# Patient Record
Sex: FEMALE | Race: WHITE | Employment: UNEMPLOYED
[De-identification: names, ages, dates, MRNs, and addresses within clinical notes are randomized per-mention and may not be internally consistent; named-entity substitution may affect disease eponyms.]

---

## 2017-01-02 ENCOUNTER — TELEPHONE (OUTPATIENT)
Dept: FAMILY MEDICINE CLINIC | Facility: CLINIC | Age: 52
End: 2017-01-02

## 2017-11-04 ENCOUNTER — HOSPITAL ENCOUNTER (EMERGENCY)
Age: 52
Discharge: HOME | End: 2017-11-04
Payer: COMMERCIAL

## 2017-11-04 VITALS
OXYGEN SATURATION: 100 % | SYSTOLIC BLOOD PRESSURE: 161 MMHG | TEMPERATURE: 99.4 F | RESPIRATION RATE: 16 BRPM | DIASTOLIC BLOOD PRESSURE: 73 MMHG | HEART RATE: 81 BPM

## 2017-11-04 ASSESSMENT — PAIN DESCRIPTION - PAIN TYPE: TYPE: ACUTE PAIN

## 2017-11-04 ASSESSMENT — PAIN DESCRIPTION - LOCATION: LOCATION: LEG

## 2017-11-04 ASSESSMENT — PAIN DESCRIPTION - ORIENTATION: ORIENTATION: LEFT

## 2017-11-04 ASSESSMENT — PAIN SCALES - GENERAL: PAINLEVEL_OUTOF10: 3

## 2017-11-04 ASSESSMENT — PAIN DESCRIPTION - DESCRIPTORS: DESCRIPTORS: ACHING;TINGLING;NUMBNESS

## 2019-04-02 ENCOUNTER — OFFICE VISIT (OUTPATIENT)
Dept: PRIMARY CARE CLINIC | Age: 54
End: 2019-04-02
Payer: COMMERCIAL

## 2019-04-02 VITALS
WEIGHT: 171 LBS | TEMPERATURE: 98.4 F | SYSTOLIC BLOOD PRESSURE: 169 MMHG | HEIGHT: 67 IN | DIASTOLIC BLOOD PRESSURE: 100 MMHG | BODY MASS INDEX: 26.84 KG/M2 | HEART RATE: 73 BPM | RESPIRATION RATE: 12 BRPM

## 2019-04-02 DIAGNOSIS — Z12.11 SCREENING FOR COLON CANCER: Primary | ICD-10-CM

## 2019-04-02 DIAGNOSIS — I10 ESSENTIAL HYPERTENSION: Primary | ICD-10-CM

## 2019-04-02 DIAGNOSIS — Z72.0 TOBACCO ABUSE: ICD-10-CM

## 2019-04-02 DIAGNOSIS — F34.1 DYSTHYMIA: ICD-10-CM

## 2019-04-02 DIAGNOSIS — R53.83 FATIGUE, UNSPECIFIED TYPE: ICD-10-CM

## 2019-04-02 DIAGNOSIS — R93.5 ABNORMAL CT OF THE ABDOMEN: ICD-10-CM

## 2019-04-02 DIAGNOSIS — Z76.89 ENCOUNTER TO ESTABLISH CARE: ICD-10-CM

## 2019-04-02 PROCEDURE — G8427 DOCREV CUR MEDS BY ELIG CLIN: HCPCS | Performed by: NURSE PRACTITIONER

## 2019-04-02 PROCEDURE — 3017F COLORECTAL CA SCREEN DOC REV: CPT | Performed by: NURSE PRACTITIONER

## 2019-04-02 PROCEDURE — 99213 OFFICE O/P EST LOW 20 MIN: CPT | Performed by: NURSE PRACTITIONER

## 2019-04-02 PROCEDURE — 4004F PT TOBACCO SCREEN RCVD TLK: CPT | Performed by: NURSE PRACTITIONER

## 2019-04-02 PROCEDURE — G8419 CALC BMI OUT NRM PARAM NOF/U: HCPCS | Performed by: NURSE PRACTITIONER

## 2019-04-02 RX ORDER — LISINOPRIL 10 MG/1
10 TABLET ORAL DAILY
Qty: 90 TABLET | Refills: 0 | Status: SHIPPED | OUTPATIENT
Start: 2019-04-02 | End: 2019-07-03 | Stop reason: SDUPTHER

## 2019-04-02 RX ORDER — PAROXETINE HYDROCHLORIDE 20 MG/1
20 TABLET, FILM COATED ORAL EVERY MORNING
Qty: 30 TABLET | Refills: 3 | Status: SHIPPED | OUTPATIENT
Start: 2019-04-02 | End: 2019-04-08 | Stop reason: SINTOL

## 2019-04-02 ASSESSMENT — PATIENT HEALTH QUESTIONNAIRE - PHQ9
8. MOVING OR SPEAKING SO SLOWLY THAT OTHER PEOPLE COULD HAVE NOTICED. OR THE OPPOSITE, BEING SO FIGETY OR RESTLESS THAT YOU HAVE BEEN MOVING AROUND A LOT MORE THAN USUAL: 0
SUM OF ALL RESPONSES TO PHQ9 QUESTIONS 1 & 2: 4
5. POOR APPETITE OR OVEREATING: 0
3. TROUBLE FALLING OR STAYING ASLEEP: 2
6. FEELING BAD ABOUT YOURSELF - OR THAT YOU ARE A FAILURE OR HAVE LET YOURSELF OR YOUR FAMILY DOWN: 0
1. LITTLE INTEREST OR PLEASURE IN DOING THINGS: 2
SUM OF ALL RESPONSES TO PHQ QUESTIONS 1-9: 10
7. TROUBLE CONCENTRATING ON THINGS, SUCH AS READING THE NEWSPAPER OR WATCHING TELEVISION: 2
4. FEELING TIRED OR HAVING LITTLE ENERGY: 2
2. FEELING DOWN, DEPRESSED OR HOPELESS: 2
9. THOUGHTS THAT YOU WOULD BE BETTER OFF DEAD, OR OF HURTING YOURSELF: 0
10. IF YOU CHECKED OFF ANY PROBLEMS, HOW DIFFICULT HAVE THESE PROBLEMS MADE IT FOR YOU TO DO YOUR WORK, TAKE CARE OF THINGS AT HOME, OR GET ALONG WITH OTHER PEOPLE: 1
SUM OF ALL RESPONSES TO PHQ QUESTIONS 1-9: 10

## 2019-04-02 ASSESSMENT — ENCOUNTER SYMPTOMS
BLURRED VISION: 0
SINUS PAIN: 0
BLOOD IN STOOL: 0
DIARRHEA: 0
ABDOMINAL DISTENTION: 0
WHEEZING: 0
SINUS PRESSURE: 0
VOMITING: 0
ABDOMINAL PAIN: 0
BACK PAIN: 1
NAUSEA: 0
RHINORRHEA: 0
PHOTOPHOBIA: 0
SHORTNESS OF BREATH: 0
CONSTIPATION: 1

## 2019-04-02 NOTE — PATIENT INSTRUCTIONS
problems. You and your doctor will talk about your risks of these problems based on your blood pressure. Your doctor will give you a goal for your blood pressure. Your goal will be based on your health and your age. Lifestyle changes, such as eating healthy and being active, are always important to help lower blood pressure. You might also take medicine to reach your blood pressure goal.  Follow-up care is a key part of your treatment and safety. Be sure to make and go to all appointments, and call your doctor if you are having problems. It's also a good idea to know your test results and keep a list of the medicines you take. How can you care for yourself at home? Medical treatment  · If you stop taking your medicine, your blood pressure will go back up. You may take one or more types of medicine to lower your blood pressure. Be safe with medicines. Take your medicine exactly as prescribed. Call your doctor if you think you are having a problem with your medicine. · Talk to your doctor before you start taking aspirin every day. Aspirin can help certain people lower their risk of a heart attack or stroke. But taking aspirin isn't right for everyone, because it can cause serious bleeding. · See your doctor regularly. You may need to see the doctor more often at first or until your blood pressure comes down. · If you are taking blood pressure medicine, talk to your doctor before you take decongestants or anti-inflammatory medicine, such as ibuprofen. Some of these medicines can raise blood pressure. · Learn how to check your blood pressure at home. Lifestyle changes  · Stay at a healthy weight. This is especially important if you put on weight around the waist. Losing even 10 pounds can help you lower your blood pressure. · If your doctor recommends it, get more exercise. Walking is a good choice. Bit by bit, increase the amount you walk every day. Try for at least 30 minutes on most days of the week.  You also may want to swim, bike, or do other activities. · Avoid or limit alcohol. Talk to your doctor about whether you can drink any alcohol. · Try to limit how much sodium you eat to less than 2,300 milligrams (mg) a day. Your doctor may ask you to try to eat less than 1,500 mg a day. · Eat plenty of fruits (such as bananas and oranges), vegetables, legumes, whole grains, and low-fat dairy products. · Lower the amount of saturated fat in your diet. Saturated fat is found in animal products such as milk, cheese, and meat. Limiting these foods may help you lose weight and also lower your risk for heart disease. · Do not smoke. Smoking increases your risk for heart attack and stroke. If you need help quitting, talk to your doctor about stop-smoking programs and medicines. These can increase your chances of quitting for good. When should you call for help? Call 911 anytime you think you may need emergency care. This may mean having symptoms that suggest that your blood pressure is causing a serious heart or blood vessel problem. Your blood pressure may be over 180/120.   For example, call 911 if:    · You have symptoms of a heart attack. These may include:  ? Chest pain or pressure, or a strange feeling in the chest.  ? Sweating. ? Shortness of breath. ? Nausea or vomiting. ? Pain, pressure, or a strange feeling in the back, neck, jaw, or upper belly or in one or both shoulders or arms. ? Lightheadedness or sudden weakness. ? A fast or irregular heartbeat.     · You have symptoms of a stroke. These may include:  ? Sudden numbness, tingling, weakness, or loss of movement in your face, arm, or leg, especially on only one side of your body. ? Sudden vision changes. ? Sudden trouble speaking. ? Sudden confusion or trouble understanding simple statements. ? Sudden problems with walking or balance. ? A sudden, severe headache that is different from past headaches.     · You have severe back or belly pain.  Do not wait until your blood pressure comes down on its own. Get help right away.   Call your doctor now or seek immediate care if:    · Your blood pressure is much higher than normal (such as 180/120 or higher), but you don't have symptoms.     · You think high blood pressure is causing symptoms, such as:  ? Severe headache.  ? Blurry vision.    Watch closely for changes in your health, and be sure to contact your doctor if:    · Your blood pressure measures higher than your doctor recommends at least 2 times. That means the top number is higher or the bottom number is higher, or both.     · You think you may be having side effects from your blood pressure medicine. Where can you learn more? Go to [email or web address] org and sign in to your [employer] account. Enter E660 in the DE[employer] box to learn more about \"High Blood Pressure: Care Instructions. \"     If you do not have an account, please click on the \"Sign Up Now\" link. Current as of: July 22, 2018  Content Version: 11.9  © 2509-6320 [employer]. Care instructions adapted under license by [facility]"[employer]" [facility] ([facility]). If you have questions about a medical condition or this instruction, always ask your healthcare professional. [address] any warranty or liability for your use of this information.

## 2019-04-02 NOTE — PROGRESS NOTES
She will do neck exercises and they headaches typically go away). Associated symptoms include back pain and dizziness (hx of veritgo, intermittent dizziness with sudden movement. Has taken meclizine in the past with relief of symptoms however that medication makes her drowsy so she doent take it anymore). Pertinent negatives include no abdominal pain, blurred vision, fever, nausea, photophobia, rhinorrhea, seizures, sinus pressure or vomiting. Nothing aggravates the symptoms. Her past medical history is significant for hypertension and migraine headaches. There is no history of obesity or sinus disease. Hypertension   This is a new problem. The current episode started more than 1 year ago (over the past 2 years she has had elevated blood pressure when she has had health screenings for work). The problem is uncontrolled. Associated symptoms include anxiety, headaches, malaise/fatigue and palpitations. Pertinent negatives include no blurred vision, chest pain, peripheral edema or shortness of breath. There are no associated agents to hypertension. Risk factors for coronary artery disease include stress, smoking/tobacco exposure, sedentary lifestyle and post-menopausal state. Past treatments include nothing. There is no history of CAD/MI, CVA or heart failure. Past Medical History:     Past Medical History:   Diagnosis Date    GERD (gastroesophageal reflux disease)     Headache     Insomnia       Reviewed all health maintenance requirements and ordered appropriate tests  Health Maintenance Due   Topic Date Due    HIV screen  05/19/1981    Lipid screen  05/19/2006    Colon cancer screen colonoscopy  05/19/2016    Potassium monitoring  12/31/2017    Creatinine monitoring  12/31/2017       Past Surgical History:     History reviewed. No pertinent surgical history. Medications:       Prior to Admission medications    Medication Sig Start Date End Date Taking?  Authorizing Provider   lisinopril (PRINIVIL;ZESTRIL) 10 MG tablet Take 1 tablet by mouth daily 4/2/19  Yes CASTILLO Douglas CNP   PARoxetine (PAXIL) 20 MG tablet Take 1 tablet by mouth every morning 4/2/19  Yes CASTILLO Douglas CNP        Allergies:       Patient has no known allergies. Social History:     Tobacco:    reports that she has been smoking. She has never used smokeless tobacco.  Alcohol:      reports that she does not drink alcohol. Drug Use:  reports that she does not use drugs. Family History:     History reviewed. No pertinent family history. Review of Systems:     Positive and Negative as described in HPI    Review of Systems   Constitutional: Positive for fatigue and malaise/fatigue. Negative for fever and unexpected weight change. HENT: Negative for congestion, postnasal drip, rhinorrhea, sinus pressure and sinus pain. Eyes: Negative for blurred vision and photophobia. Respiratory: Negative for shortness of breath and wheezing. Cardiovascular: Positive for palpitations. Negative for chest pain. Gastrointestinal: Positive for constipation. Negative for abdominal distention, abdominal pain, blood in stool, diarrhea, nausea and vomiting. Gas   Genitourinary: Negative for difficulty urinating, dysuria and hematuria. Musculoskeletal: Positive for back pain. Negative for gait problem. Skin: Negative for rash and wound. Neurological: Positive for dizziness (hx of veritgo, intermittent dizziness with sudden movement. Has taken meclizine in the past with relief of symptoms however that medication makes her drowsy so she doent take it anymore) and headaches. Negative for seizures and light-headedness. Psychiatric/Behavioral: Positive for dysphoric mood and sleep disturbance. Negative for self-injury and suicidal ideas. The patient is nervous/anxious.         Physical Exam:   Vitals:  BP (!) 169/100 (Site: Left Upper Arm, Position: Sitting, Cuff Size: Large Adult)   Pulse 73   Temp 98.4 °F TSH With Reflex Ft4    CBC    ALT    AST    Comprehensive Metabolic Panel   2. Dysthymia  PARoxetine (PAXIL) 20 MG tablet   3. Tobacco abuse  Lipid Panel   4. Fatigue, unspecified type  TSH With Reflex Ft4    CBC   5. Encounter to establish care     6. Abnormal CT of the abdomen  CT ABDOMEN PELVIS W IV CONTRAST Additional Contrast? Radiologist Recommendation       1. Ney Oliveira received counseling on the following healthy behaviors: nutrition, exercise, medication adherence and tobacco cessation  2. Patient given educational materials - see patient instructions  3. Was a self-tracking handout given in paper form or via [employer]? No  If yes, see orders or list here. 4.  Discussed use, benefit, and side effects of prescribed medications. Barriers to medication compliance addressed. All patient questions answered. Pt voiced understanding. 5.  Reviewed prior labs and health maintenance  6. Continue current medications, diet and exercise. Completed Refills   Requested Prescriptions     Signed Prescriptions Disp Refills    lisinopril (PRINIVIL;ZESTRIL) 10 MG tablet 90 tablet 0     Sig: Take 1 tablet by mouth daily    PARoxetine (PAXIL) 20 MG tablet 30 tablet 3     Sig: Take 1 tablet by mouth every morning         Return in about 1 month (around 4/30/2019) for 2 weeks for blood pressure recheck.

## 2019-04-03 ENCOUNTER — HOSPITAL ENCOUNTER (OUTPATIENT)
Age: 54
Discharge: HOME | End: 2019-04-03
Payer: COMMERCIAL

## 2019-04-03 ENCOUNTER — TELEPHONE (OUTPATIENT)
Dept: PRIMARY CARE CLINIC | Age: 54
End: 2019-04-03

## 2019-04-03 DIAGNOSIS — I10 ESSENTIAL HYPERTENSION: ICD-10-CM

## 2019-04-03 DIAGNOSIS — R16.0 HEPATOMEGALY: ICD-10-CM

## 2019-04-03 DIAGNOSIS — R53.83 FATIGUE, UNSPECIFIED TYPE: ICD-10-CM

## 2019-04-03 DIAGNOSIS — Z12.11 SCREENING FOR COLON CANCER: ICD-10-CM

## 2019-04-03 DIAGNOSIS — D69.6 THROMBOCYTOPENIA (HCC): Primary | ICD-10-CM

## 2019-04-03 DIAGNOSIS — Z72.0 TOBACCO ABUSE: ICD-10-CM

## 2019-04-03 LAB
ALBUMIN SERPL-MCNC: 4.5 G/DL (ref 3.5–5.2)
ALBUMIN/GLOBULIN RATIO: 1.6 (ref 1–2.5)
ALP BLD-CCNC: 61 U/L (ref 35–104)
ALT SERPL-CCNC: 16 U/L (ref 5–33)
ANION GAP SERPL CALCULATED.3IONS-SCNC: 12 MMOL/L (ref 9–17)
AST SERPL-CCNC: 16 U/L
BILIRUB SERPL-MCNC: 1.04 MG/DL (ref 0.3–1.2)
BUN BLDV-MCNC: 13 MG/DL (ref 6–20)
BUN/CREAT BLD: 13 (ref 9–20)
CALCIUM SERPL-MCNC: 9.9 MG/DL (ref 8.6–10.4)
CHLORIDE BLD-SCNC: 103 MMOL/L (ref 98–107)
CHOLESTEROL/HDL RATIO: 2.7
CHOLESTEROL: 154 MG/DL
CO2: 25 MMOL/L (ref 20–31)
CONTROL: PRESENT
CREAT SERPL-MCNC: 0.98 MG/DL (ref 0.5–0.9)
GFR AFRICAN AMERICAN: >60 ML/MIN
GFR NON-AFRICAN AMERICAN: 60 ML/MIN
GFR SERPL CREATININE-BSD FRML MDRD: ABNORMAL ML/MIN/{1.73_M2}
GFR SERPL CREATININE-BSD FRML MDRD: ABNORMAL ML/MIN/{1.73_M2}
GLUCOSE BLD-MCNC: 89 MG/DL (ref 70–99)
HCT VFR BLD CALC: 42.7 % (ref 36.3–47.1)
HDLC SERPL-MCNC: 58 MG/DL
HEMOCCULT STL QL: NEGATIVE
HEMOGLOBIN: 14 G/DL (ref 11.9–15.1)
LDL CHOLESTEROL: 84 MG/DL (ref 0–130)
MCH RBC QN AUTO: 29.6 PG (ref 25.2–33.5)
MCHC RBC AUTO-ENTMCNC: 32.8 G/DL (ref 28.4–34.8)
MCV RBC AUTO: 90.3 FL (ref 82.6–102.9)
NRBC AUTOMATED: 0 PER 100 WBC
PDW BLD-RTO: 11.9 % (ref 11.8–14.4)
PLATELET # BLD: 105 K/UL (ref 138–453)
PMV BLD AUTO: 12.6 FL (ref 8.1–13.5)
POTASSIUM SERPL-SCNC: 4.1 MMOL/L (ref 3.7–5.3)
RBC # BLD: 4.73 M/UL (ref 3.95–5.11)
SODIUM BLD-SCNC: 140 MMOL/L (ref 135–144)
THYROXINE, FREE: 1.13 NG/DL (ref 0.93–1.7)
TOTAL PROTEIN: 7.3 G/DL (ref 6.4–8.3)
TRIGL SERPL-MCNC: 60 MG/DL
TSH SERPL DL<=0.05 MIU/L-ACNC: 5.1 MIU/L (ref 0.3–5)
VLDLC SERPL CALC-MCNC: NORMAL MG/DL (ref 1–30)
WBC # BLD: 5 K/UL (ref 3.5–11.3)

## 2019-04-03 PROCEDURE — 82274 ASSAY TEST FOR BLOOD FECAL: CPT | Performed by: NURSE PRACTITIONER

## 2019-04-03 PROCEDURE — 80061 LIPID PANEL: CPT

## 2019-04-03 PROCEDURE — 84439 ASSAY OF FREE THYROXINE: CPT

## 2019-04-03 PROCEDURE — 36415 COLL VENOUS BLD VENIPUNCTURE: CPT

## 2019-04-03 PROCEDURE — 85027 COMPLETE CBC AUTOMATED: CPT

## 2019-04-03 PROCEDURE — 84443 ASSAY THYROID STIM HORMONE: CPT

## 2019-04-03 PROCEDURE — 80053 COMPREHEN METABOLIC PANEL: CPT

## 2019-04-03 NOTE — TELEPHONE ENCOUNTER
Called patient and informed her that her OC FIT test results were normal and that Charli received her lab results back and her platelet count was decreased and that platelets help the blood clot and they can be decreased if you have a liver condition. Informed that Rashi Mcknight is referring her to Dr Tyler Robledo, hematologist and Dr Luís Buck, GI specialist due to decreased platelets and those offices will be calling her to schedule appt. Informed that he is also ordering a CT scan of abdomen. Verbalizes understanding.

## 2019-04-03 NOTE — TELEPHONE ENCOUNTER
----- Message from CASTILLO Jarvis CNP sent at 4/3/2019 12:50 PM EDT -----  Please notify patient that I have ordered another CT of her abdomen due to her abnormal CT scan in 2016. Also let her know that her platelet count is decreased and I am sending her to see hematology. Platelets help your blood clot. They can be decreased if you have a liver condition so I would also like her to see GI doctor also.  Thanks

## 2019-04-04 ENCOUNTER — OFFICE VISIT (OUTPATIENT)
Dept: ONCOLOGY | Age: 54
End: 2019-04-04
Payer: COMMERCIAL

## 2019-04-04 VITALS
SYSTOLIC BLOOD PRESSURE: 134 MMHG | BODY MASS INDEX: 26.59 KG/M2 | HEIGHT: 67 IN | HEART RATE: 78 BPM | TEMPERATURE: 99.7 F | RESPIRATION RATE: 20 BRPM | DIASTOLIC BLOOD PRESSURE: 99 MMHG | WEIGHT: 169.4 LBS

## 2019-04-04 DIAGNOSIS — D69.6 THROMBOCYTOPENIA (HCC): ICD-10-CM

## 2019-04-04 PROCEDURE — 99204 OFFICE O/P NEW MOD 45 MIN: CPT | Performed by: INTERNAL MEDICINE

## 2019-04-04 PROCEDURE — G8419 CALC BMI OUT NRM PARAM NOF/U: HCPCS | Performed by: INTERNAL MEDICINE

## 2019-04-04 PROCEDURE — G8427 DOCREV CUR MEDS BY ELIG CLIN: HCPCS | Performed by: INTERNAL MEDICINE

## 2019-04-04 PROCEDURE — 4004F PT TOBACCO SCREEN RCVD TLK: CPT | Performed by: INTERNAL MEDICINE

## 2019-04-04 PROCEDURE — 3017F COLORECTAL CA SCREEN DOC REV: CPT | Performed by: INTERNAL MEDICINE

## 2019-04-04 ASSESSMENT — ENCOUNTER SYMPTOMS
CHEST TIGHTNESS: 0
EYE REDNESS: 0
ABDOMINAL PAIN: 0
EYE ITCHING: 0
DIARRHEA: 0
VOMITING: 0
CONSTIPATION: 0
NAUSEA: 0
COUGH: 0
SHORTNESS OF BREATH: 0
WHEEZING: 0
BACK PAIN: 1
COLOR CHANGE: 0
BLOOD IN STOOL: 0

## 2019-04-04 NOTE — LETTER
2019     CASTILLO Hoffman CNP   [address]    Dear CASTILLO Anna CNP, and Dr. Calderon Roberson: Thank you for referring Don Balderas, 1965, to me for evaluation. Below are the relevant portions of my assessment and plan of care. [facility]  [facility]  [facility]  [address] [id number]  Dept: [phone]  Dept Fax: [phone]  Jennifer Villalta is a 47 y.o. female who presentstoday for follow up of her   Chief Complaint   Patient presents with    New Patient     Referred by Berny Avina Thrombocytopenia         HPI Don Balderas is a 73-year-old lady who has had thrombocytopenia for quite some time. Her recent platelet count is 684,357. In 2016 she had a CAT scan of the abdomen and pelvis when she went to the emergency room and was sick after her son . The scan showed multiple nodules and masses scattered throughout the retroperitoneum and mesentery area the largest one was 2.4 x 3.7 cm in size. It also showed hepatomegaly. She never followed up for that and was referred to me for evaluation of thrombocytopenia. She denies any fevers but she states she's had night sweats for years. She has not lost any weight. She has family history of leukemia. She also has depression and was recently started on Paxil. She smokes one half pack of cigarettes a day and is cutting down on it now. She does not drink any alcohol but used to drink heavy when she was a teenager. Current Outpatient Medications   Medication Sig Dispense Refill    lisinopril (PRINIVIL;ZESTRIL) 10 MG tablet Take 1 tablet by mouth daily 90 tablet 0    PARoxetine (PAXIL) 20 MG tablet Take 1 tablet by mouth every morning 30 tablet 3     No current facility-administered medications for this visit.         No Known Allergies    Past Medical History:   Diagnosis Date    GERD (gastroesophageal reflux disease)     Headache     Insomnia History reviewed. No pertinent surgical history. History reviewed. No pertinent family history. Social History     Tobacco Use    Smoking status: Current Every Day Smoker    Smokeless tobacco: Never Used   Substance Use Topics    Alcohol use: No          The Past MedicalHistory, Past Surgical History, Past Family History and Past Social History havebeen reviewed      Review of Systems   Constitutional: Negative for activity change, appetite change, chills, diaphoresis, fatigue, fever and unexpected weight change. HENT: Negative for congestion, hearing loss, mouth sores and nosebleeds. Eyes: Negative for redness, itching and visual disturbance. Respiratory: Negative for cough, chest tightness, shortness of breath and wheezing. Cardiovascular: Negative for chest pain, palpitations and leg swelling. Gastrointestinal: Negative for abdominal pain, blood in stool, constipation, diarrhea, nausea and vomiting. Genitourinary: Negative for decreased urine volume, difficulty urinating, dysuria, flank pain, frequency, hematuria, pelvic pain and urgency. Musculoskeletal: Positive for back pain. Negative for arthralgias, joint swelling and myalgias. Skin: Negative for color change, pallor and rash. Neurological: Negative for dizziness, seizures, syncope, weakness, light-headedness, numbness and headaches. Hematological: Negative for adenopathy. Does not bruise/bleed easily. Psychiatric/Behavioral: Negative for agitation, behavioral problems and confusion. Depressed     Physical Exam   Constitutional: She is oriented to person, place, and time. She appears well-developed and well-nourished. No distress. HENT:   Head: Normocephalic. Eyes: Pupils are equal, round, and reactive to light. No scleral icterus. Neck: Neck supple. No thyromegaly present. Cardiovascular: Normal rate and regular rhythm. No murmur heard.   Pulmonary/Chest: Effort normal and breath sounds normal. No respiratory distress. She has no wheezes. Right breast exhibits no mass. Left breast exhibits no mass. Abdominal: Soft. She exhibits no mass. There is no hepatosplenomegaly. There is no tenderness. Musculoskeletal: Normal range of motion. She exhibits no edema or tenderness. Lymphadenopathy:     She has no cervical adenopathy. She has no axillary adenopathy. Neurological: She is alert and oriented to person, place, and time. No cranial nerve deficit. Skin: Skin is warm and dry. No cyanosis. Nails show no clubbing. Psychiatric: She has a normal mood and affect. Her behavior is normal. Thought content normal.   Nursing note and vitals reviewed.     Results for orders placed or performed during the hospital encounter of 04/03/19   Lipid Panel   Result Value Ref Range    Cholesterol 154 <200 mg/dL    HDL 58 >40 mg/dL    LDL Cholesterol 84 0 - 130 mg/dL    Chol/HDL Ratio 2.7 <5    Triglycerides 60 <150 mg/dL    VLDL NOT REPORTED 1 - 30 mg/dL   TSH With Reflex Ft4   Result Value Ref Range    TSH 5.10 (H) 0.30 - 5.00 mIU/L   CBC   Result Value Ref Range    WBC 5.0 3.5 - 11.3 k/uL    RBC 4.73 3.95 - 5.11 m/uL    Hemoglobin 14.0 11.9 - 15.1 g/dL    Hematocrit 42.7 36.3 - 47.1 %    MCV 90.3 82.6 - 102.9 fL    MCH 29.6 25.2 - 33.5 pg    MCHC 32.8 28.4 - 34.8 g/dL    RDW 11.9 11.8 - 14.4 %    Platelets 010 (L) 189 - 453 k/uL    MPV 12.6 8.1 - 13.5 fL    NRBC Automated 0.0 0.0 per 100 WBC   Comprehensive Metabolic Panel   Result Value Ref Range    Glucose 89 70 - 99 mg/dL    BUN 13 6 - 20 mg/dL    CREATININE 0.98 (H) 0.50 - 0.90 mg/dL    Bun/Cre Ratio 13 9 - 20    Calcium 9.9 8.6 - 10.4 mg/dL    Sodium 140 135 - 144 mmol/L    Potassium 4.1 3.7 - 5.3 mmol/L    Chloride 103 98 - 107 mmol/L    CO2 25 20 - 31 mmol/L    Anion Gap 12 9 - 17 mmol/L    Alkaline Phosphatase 61 35 - 104 U/L    ALT 16 5 - 33 U/L    AST 16 <32 U/L    Total Bilirubin 1.04 0.3 - 1.2 mg/dL    Total Protein 7.3 6.4 - 8.3 g/dL    Alb 4.5 3.5 - 5.2 g/dL Albumin/Globulin Ratio 1.6 1.0 - 2.5    GFR Non-African American 60 (L) >60 mL/min    GFR African American >60 >60 mL/min    GFR Comment          GFR Staging         T4, Free   Result Value Ref Range    Thyroxine, Free 1.13 0.93 - 1.70 ng/dL       ASSESSMENT:      Diagnosis Orders   1. Thrombocytopenia (HCC)  BRIA    Anti-DNA Antibody, Double-Stranded    Platelet antibodies, direct    Electrophoresis Protein, Serum without Reflex to Immunofixation    Immunofixation electrophoresis    Immunoglobulin Panel (IgG, IgA, IgM)    [place]/Lambda Free Lt Chains, Serum Quant    Lactate Dehydrogenase    Antiphospholipid Antibody Panel    Lupus Anticoagulant    Ferritin    Iron and TIBC    Vitamin B12 & Folate    Soluble transferrin receptor     Thrombocytopenia rule out secondary to autoimmune etiology or an underlying bone marrow disorder or involvement of a malignant process of the bone marrow. Extensive retroperitoneal lymphadenopathy. He was scheduled for a CAT scan of the abdomen and pelvis and also will do a complete thrombocytopenia workup on her and see her back next week. PLAN:      Return in about 1 week (around 4/11/2019).     Orders Placed This Encounter   Procedures    BRIA     Standing Status:   Future     Standing Expiration Date:   4/4/2020    Anti-DNA Antibody, Double-Stranded     Standing Status:   Future     Standing Expiration Date:   4/4/2020    Platelet antibodies, direct     Standing Status:   Future     Standing Expiration Date:   4/4/2020    Electrophoresis Protein, Serum without Reflex to Immunofixation     Standing Status:   Future     Standing Expiration Date:   4/4/2020    Immunofixation electrophoresis     Standing Status:   Future     Standing Expiration Date:   4/4/2020    Immunoglobulin Panel (IgG, IgA, IgM)     Standing Status:   Future     Standing Expiration Date:   4/4/2020    [place]/Lambda Free Lt Chains, Serum Quant     Standing Status:   Future Standing Expiration Date:   4/4/2020    Lactate Dehydrogenase     Standing Status:   Future     Standing Expiration Date:   4/4/2020    Antiphospholipid Antibody Panel     Standing Status:   Future     Standing Expiration Date:   4/4/2020    Lupus Anticoagulant     Standing Status:   Future     Standing Expiration Date:   4/4/2020    Ferritin     Standing Status:   Future     Standing Expiration Date:   4/4/2020    Iron and TIBC     Standing Status:   Future     Standing Expiration Date:   4/4/2020     Order Specific Question:   Is Patient Fasting? Answer:   No     Order Specific Question:   No of Hours? Answer:   No    Vitamin B12 & Folate     Standing Status:   Future     Standing Expiration Date:   4/4/2020    Soluble transferrin receptor     Standing Status:   Future     Standing Expiration Date:   4/4/2020     No orders of the defined types were placed in this encounter. Electronicallysigned by Kristen Rodriguez MD on 4/4/2019 at 11:31 AM      If you have questions, please do not hesitate to call me. I look forward to following Cindy Salguero along with you.     Sincerely,        Kristen Rodriguez MD  Phone: [phone]

## 2019-04-04 NOTE — PROGRESS NOTES
[facility] PHYSICIANS  [facility] ONCOLOG[facility] SPECIALISTS  [address]  [place] [id number]  Dept: [phone]  Dept Fax: [phone]  Romero Lanier is a 47 y.o. female who presentstoday for follow up of her   Chief Complaint   Patient presents with    New Patient     Referred by Ivon Dsouza Thrombocytopenia         HPI Blaire Alexander is a 50-year-old lady who has had thrombocytopenia for quite some time. Her recent platelet count is [phone]. In 2016 she had a CAT scan of the abdomen and pelvis when she went to the emergency room and was sick after her son . The scan showed multiple nodules and masses scattered throughout the retroperitoneum and mesentery area the largest one was 2.4 x 3.7 cm in size. It also showed hepatomegaly. She never followed up for that and was referred to me for evaluation of thrombocytopenia. She denies any fevers but she states she's had night sweats for years. She has not lost any weight. She has family history of leukemia. She also has depression and was recently started on Paxil. She smokes one half pack of cigarettes a day and is cutting down on it now. She does not drink any alcohol but used to drink heavy when she was a teenager. Current Outpatient Medications   Medication Sig Dispense Refill    lisinopril (PRINIVIL;ZESTRIL) 10 MG tablet Take 1 tablet by mouth daily 90 tablet 0    PARoxetine (PAXIL) 20 MG tablet Take 1 tablet by mouth every morning 30 tablet 3     No current facility-administered medications for this visit. No Known Allergies    Past Medical History:   Diagnosis Date    GERD (gastroesophageal reflux disease)     Headache     Insomnia         History reviewed. No pertinent surgical history. History reviewed. No pertinent family history.     Social History     Tobacco Use    Smoking status: Current Every Day Smoker    Smokeless tobacco: Never Used   Substance Use Topics    Alcohol use: No          The Past MedicalHistory, Past Surgical History, Past Family History and Past Social History havebeen reviewed      Review of Systems   Constitutional: Negative for activity change, appetite change, chills, diaphoresis, fatigue, fever and unexpected weight change. HENT: Negative for congestion, hearing loss, mouth sores and nosebleeds. Eyes: Negative for redness, itching and visual disturbance. Respiratory: Negative for cough, chest tightness, shortness of breath and wheezing. Cardiovascular: Negative for chest pain, palpitations and leg swelling. Gastrointestinal: Negative for abdominal pain, blood in stool, constipation, diarrhea, nausea and vomiting. Genitourinary: Negative for decreased urine volume, difficulty urinating, dysuria, flank pain, frequency, hematuria, pelvic pain and urgency. Musculoskeletal: Positive for back pain. Negative for arthralgias, joint swelling and myalgias. Skin: Negative for color change, pallor and rash. Neurological: Negative for dizziness, seizures, syncope, weakness, light-headedness, numbness and headaches. Hematological: Negative for adenopathy. Does not bruise/bleed easily. Psychiatric/Behavioral: Negative for agitation, behavioral problems and confusion. Depressed     Physical Exam   Constitutional: She is oriented to person, place, and time. She appears well-developed and well-nourished. No distress. HENT:   Head: Normocephalic. Eyes: Pupils are equal, round, and reactive to light. No scleral icterus. Neck: Neck supple. No thyromegaly present. Cardiovascular: Normal rate and regular rhythm. No murmur heard. Pulmonary/Chest: Effort normal and breath sounds normal. No respiratory distress. She has no wheezes. Right breast exhibits no mass. Left breast exhibits no mass. Abdominal: Soft. She exhibits no mass. There is no hepatosplenomegaly. There is no tenderness. Musculoskeletal: Normal range of motion. She exhibits no edema or tenderness.    Lymphadenopathy:     She has no cervical adenopathy. She has no axillary adenopathy. Neurological: She is alert and oriented to person, place, and time. No cranial nerve deficit. Skin: Skin is warm and dry. No cyanosis. Nails show no clubbing. Psychiatric: She has a normal mood and affect. Her behavior is normal. Thought content normal.   Nursing note and vitals reviewed. Results for orders placed or performed during the hospital encounter of 04/03/19   Lipid Panel   Result Value Ref Range    Cholesterol 154 <200 mg/dL    HDL 58 >40 mg/dL    LDL Cholesterol 84 0 - 130 mg/dL    Chol/HDL Ratio 2.7 <5    Triglycerides 60 <150 mg/dL    VLDL NOT REPORTED 1 - 30 mg/dL   TSH With Reflex Ft4   Result Value Ref Range    TSH 5.10 (H) 0.30 - 5.00 mIU/L   CBC   Result Value Ref Range    WBC 5.0 3.5 - 11.3 k/uL    RBC 4.73 3.95 - 5.11 m/uL    Hemoglobin 14.0 11.9 - 15.1 g/dL    Hematocrit 42.7 36.3 - 47.1 %    MCV 90.3 82.6 - 102.9 fL    MCH 29.6 25.2 - 33.5 pg    MCHC 32.8 28.4 - 34.8 g/dL    RDW 11.9 11.8 - 14.4 %    Platelets 605 (L) 389 - 453 k/uL    MPV 12.6 8.1 - 13.5 fL    NRBC Automated 0.0 0.0 per 100 WBC   Comprehensive Metabolic Panel   Result Value Ref Range    Glucose 89 70 - 99 mg/dL    BUN 13 6 - 20 mg/dL    CREATININE 0.98 (H) 0.50 - 0.90 mg/dL    Bun/Cre Ratio 13 9 - 20    Calcium 9.9 8.6 - 10.4 mg/dL    Sodium 140 135 - 144 mmol/L    Potassium 4.1 3.7 - 5.3 mmol/L    Chloride 103 98 - 107 mmol/L    CO2 25 20 - 31 mmol/L    Anion Gap 12 9 - 17 mmol/L    Alkaline Phosphatase 61 35 - 104 U/L    ALT 16 5 - 33 U/L    AST 16 <32 U/L    Total Bilirubin 1.04 0.3 - 1.2 mg/dL    Total Protein 7.3 6.4 - 8.3 g/dL    Alb 4.5 3.5 - 5.2 g/dL    Albumin/Globulin Ratio 1.6 1.0 - 2.5    GFR Non-African American 60 (L) >60 mL/min    GFR African American >60 >60 mL/min    GFR Comment          GFR Staging         T4, Free   Result Value Ref Range    Thyroxine, Free 1.13 0.93 - 1.70 ng/dL       ASSESSMENT:      Diagnosis Orders   1.  Thrombocytopenia ([facility]  BRIA    Anti-DNA Antibody, Double-Stranded    Platelet antibodies, direct    Electrophoresis Protein, Serum without Reflex to Immunofixation    Immunofixation electrophoresis    Immunoglobulin Panel (IgG, IgA, IgM)    [place]/Lambda Free Lt Chains, Serum Quant    Lactate Dehydrogenase    Antiphospholipid Antibody Panel    Lupus Anticoagulant    Ferritin    Iron and TIBC    Vitamin B12 & Folate    Soluble transferrin receptor     Thrombocytopenia rule out secondary to autoimmune etiology or an underlying bone marrow disorder or involvement of a malignant process of the bone marrow. Extensive retroperitoneal lymphadenopathy. He was scheduled for a CAT scan of the abdomen and pelvis and also will do a complete thrombocytopenia workup on her and see her back next week. PLAN:      Return in about 1 week (around 4/11/2019).     Orders Placed This Encounter   Procedures    BRIA     Standing Status:   Future     Standing Expiration Date:   4/4/2020    Anti-DNA Antibody, Double-Stranded     Standing Status:   Future     Standing Expiration Date:   4/4/2020    Platelet antibodies, direct     Standing Status:   Future     Standing Expiration Date:   4/4/2020    Electrophoresis Protein, Serum without Reflex to Immunofixation     Standing Status:   Future     Standing Expiration Date:   4/4/2020    Immunofixation electrophoresis     Standing Status:   Future     Standing Expiration Date:   4/4/2020    Immunoglobulin Panel (IgG, IgA, IgM)     Standing Status:   Future     Standing Expiration Date:   4/4/2020    [place]/Lambda Free Lt Chains, Serum Quant     Standing Status:   Future     Standing Expiration Date:   4/4/2020    Lactate Dehydrogenase     Standing Status:   Future     Standing Expiration Date:   4/4/2020    Antiphospholipid Antibody Panel     Standing Status:   Future     Standing Expiration Date:   4/4/2020    Lupus Anticoagulant     Standing Status:   Future     Standing Expiration Date:   4/4/2020  Ferritin     Standing Status:   Future     Standing Expiration Date:   4/4/2020    Iron and TIBC     Standing Status:   Future     Standing Expiration Date:   4/4/2020     Order Specific Question:   Is Patient Fasting? Answer:   No     Order Specific Question:   No of Hours? Answer:   No    Vitamin B12 & Folate     Standing Status:   Future     Standing Expiration Date:   4/4/2020    Soluble transferrin receptor     Standing Status:   Future     Standing Expiration Date:   4/4/2020     No orders of the defined types were placed in this encounter.           Electronicallysigned by Sofi Arias MD on 4/4/2019 at 11:31 AM

## 2019-04-08 ENCOUNTER — TELEPHONE (OUTPATIENT)
Dept: PRIMARY CARE CLINIC | Age: 54
End: 2019-04-08

## 2019-04-08 DIAGNOSIS — F34.1 DYSTHYMIA: Primary | ICD-10-CM

## 2019-04-08 RX ORDER — SERTRALINE HYDROCHLORIDE 25 MG/1
25 TABLET, FILM COATED ORAL DAILY
Qty: 30 TABLET | Refills: 0 | Status: SHIPPED | OUTPATIENT
Start: 2019-04-08 | End: 2019-04-30 | Stop reason: SDUPTHER

## 2019-04-08 NOTE — TELEPHONE ENCOUNTER
Phone call from patient stating \"I cannot take the Paxil, it makes me severely nauseated and I feel like a zombie. \" Asking if there is something else she should try.       Health Maintenance   Topic Date Due    HIV screen  05/19/1981    DTaP/Tdap/Td vaccine (1 - Tdap) 04/02/2020 (Originally 5/19/1985)    Breast cancer screen  04/02/2020 (Originally 5/19/2016)    Cervical cancer screen  04/02/2020 (Originally 5/19/1987)    Shingles Vaccine (1 of 2) 04/02/2020 (Originally 5/19/2016)    Pneumococcal 0-64 years at Risk Vaccine (1 of 1 - PPSV23) 04/02/2020 (Originally 5/19/1972)    Flu vaccine (Season Ended) 09/01/2019    Colon Cancer Screen FIT/FOBT  04/03/2020    Potassium monitoring  04/03/2020    Creatinine monitoring  04/03/2020    Lipid screen  04/03/2024             (applicable per patient's age: Cancer Screenings, Depression Screening, Fall Risk Screening, Immunizations)    LDL Cholesterol (mg/dL)   Date Value   04/03/2019 84     AST (U/L)   Date Value   04/03/2019 16     ALT (U/L)   Date Value   04/03/2019 16     BUN (mg/dL)   Date Value   04/03/2019 13      (goal A1C is < 7)   (goal LDL is <100) need 30-50% reduction from baseline     BP Readings from Last 3 Encounters:   04/04/19 (!) 134/99   04/02/19 (!) 169/100   11/04/17 (!) 161/73    (goal /80)      All Future Testing planned in [facility]:  Lab Frequency Next Occurrence   CT ABDOMEN PELVIS W IV CONTRAST Additional Contrast? Radiologist Recommendation Once 04/03/2019   BRIA Once 04/04/2019   Anti-DNA Antibody, Double-Stranded Once 04/04/2019   Platelet antibodies, direct Once 04/04/2019   Electrophoresis Protein, Serum without Reflex to Immunofixation Once 04/04/2019   Immunofixation electrophoresis Once 04/04/2019   Immunoglobulin Panel (IgG, IgA, IgM) Once 04/04/2019   [place]/Lambda Free Lt Chains, Serum Quant Once 04/04/2019   Lactate Dehydrogenase Once 04/04/2019   Antiphospholipid Antibody Panel Once 04/04/2019   Lupus Anticoagulant Once 04/04/2019   Ferritin Once 04/04/2019   Iron and TIBC Once 04/04/2019   Vitamin B12 & Folate Once 04/04/2019   Soluble transferrin receptor Once 04/04/2019       Next Visit Date:  Future Appointments   Date Time Provider Jeramy Jaramilloi   4/12/2019  5:40 PM [facility] LAB DRAWING ROOM [facility] LAB None   4/12/2019  6:00 PM [facility] CAT SCAN ROOM [facility] CT [facility] Rad   4/16/2019  1:00 PM Charli Mathews, APRN - CNP [facility]   4/25/2019  2:00 PM Vidal Farmer MD [facility]   4/30/2019  1:00 PM Charli Mathews, APRN - CNP [facility]            Patient Active Problem List:     Thrombocytopenia ([facility] [address]

## 2019-04-12 ENCOUNTER — HOSPITAL ENCOUNTER (OUTPATIENT)
Dept: LAB | Age: 54
Discharge: HOME | End: 2019-04-12
Payer: COMMERCIAL

## 2019-04-12 ENCOUNTER — HOSPITAL ENCOUNTER (OUTPATIENT)
Dept: CT IMAGING | Age: 54
Discharge: HOME | End: 2019-04-14
Payer: COMMERCIAL

## 2019-04-12 DIAGNOSIS — R93.5 ABNORMAL CT OF THE ABDOMEN: ICD-10-CM

## 2019-04-12 DIAGNOSIS — D69.6 THROMBOCYTOPENIA (HCC): ICD-10-CM

## 2019-04-12 LAB
FERRITIN: 202 UG/L (ref 13–150)
FOLATE: >20 NG/ML
FREE KAPPA/LAMBDA RATIO: 1.4 (ref 0.26–1.65)
IRON SATURATION: 28 % (ref 20–55)
IRON: 113 UG/DL (ref 37–145)
KAPPA FREE LIGHT CHAINS QNT: 1.61 MG/DL (ref 0.37–1.94)
LACTATE DEHYDROGENASE: 490 U/L (ref 135–214)
LAMBDA FREE LIGHT CHAINS QNT: 1.15 MG/DL (ref 0.57–2.63)
TOTAL IRON BINDING CAPACITY: 405 UG/DL (ref 250–450)
UNSATURATED IRON BINDING CAPACITY: 292.1 UG/DL (ref 112–347)
VITAMIN B-12: 624 PG/ML (ref 232–1245)

## 2019-04-12 PROCEDURE — 85730 THROMBOPLASTIN TIME PARTIAL: CPT

## 2019-04-12 PROCEDURE — 82607 VITAMIN B-12: CPT

## 2019-04-12 PROCEDURE — 74177 CT ABD & PELVIS W/CONTRAST: CPT

## 2019-04-12 PROCEDURE — 82728 ASSAY OF FERRITIN: CPT

## 2019-04-12 PROCEDURE — 86147 CARDIOLIPIN ANTIBODY EA IG: CPT

## 2019-04-12 PROCEDURE — 6360000004 HC RX CONTRAST MEDICATION: Performed by: NURSE PRACTITIONER

## 2019-04-12 PROCEDURE — 82746 ASSAY OF FOLIC ACID SERUM: CPT

## 2019-04-12 PROCEDURE — 36415 COLL VENOUS BLD VENIPUNCTURE: CPT

## 2019-04-12 PROCEDURE — 86038 ANTINUCLEAR ANTIBODIES: CPT

## 2019-04-12 PROCEDURE — 86225 DNA ANTIBODY NATIVE: CPT

## 2019-04-12 PROCEDURE — 85610 PROTHROMBIN TIME: CPT

## 2019-04-12 PROCEDURE — 83550 IRON BINDING TEST: CPT

## 2019-04-12 PROCEDURE — 83615 LACTATE (LD) (LDH) ENZYME: CPT

## 2019-04-12 PROCEDURE — 84155 ASSAY OF PROTEIN SERUM: CPT

## 2019-04-12 PROCEDURE — 83540 ASSAY OF IRON: CPT

## 2019-04-12 PROCEDURE — 82784 ASSAY IGA/IGD/IGG/IGM EACH: CPT

## 2019-04-12 PROCEDURE — 84238 ASSAY NONENDOCRINE RECEPTOR: CPT

## 2019-04-12 PROCEDURE — 85613 RUSSELL VIPER VENOM DILUTED: CPT

## 2019-04-12 PROCEDURE — 83883 ASSAY NEPHELOMETRY NOT SPEC: CPT

## 2019-04-12 PROCEDURE — 84165 PROTEIN E-PHORESIS SERUM: CPT

## 2019-04-12 PROCEDURE — 86023 IMMUNOGLOBULIN ASSAY: CPT

## 2019-04-12 PROCEDURE — 86334 IMMUNOFIX E-PHORESIS SERUM: CPT

## 2019-04-12 RX ADMIN — IOPAMIDOL 75 ML: 755 INJECTION, SOLUTION INTRAVENOUS at 18:11

## 2019-04-12 RX ADMIN — IOPAMIDOL 18 ML: 755 INJECTION, SOLUTION INTRAVENOUS at 17:10

## 2019-04-13 LAB
IGA: 160 MG/DL (ref 70–400)
IGG: 1189 MG/DL (ref 700–1600)
IGM: 61 MG/DL (ref 40–230)

## 2019-04-14 LAB — SOLUBLE TRANSFERRIN RECEPT: 2.4 MG/L (ref 1.9–4.4)

## 2019-04-15 LAB
ALBUMIN (CALCULATED): 5.1 G/DL (ref 3.2–5.2)
ALBUMIN PERCENT: 68 % (ref 45–65)
ALPHA 1 PERCENT: 2 % (ref 3–6)
ALPHA 2 PERCENT: 8 % (ref 6–13)
ALPHA-1-GLOBULIN: 0.1 G/DL (ref 0.1–0.4)
ALPHA-2-GLOBULIN: 0.6 G/DL (ref 0.5–0.9)
ANTI DNA DOUBLE STRANDED: 40 IU/ML
ANTI-NUCLEAR ANTIBODY (ANA): NEGATIVE
BETA GLOBULIN: 0.7 G/DL (ref 0.5–1.1)
BETA PERCENT: 9 % (ref 11–19)
GAMMA GLOBULIN %: 13 % (ref 9–20)
GAMMA GLOBULIN: 1 G/DL (ref 0.5–1.5)
PATHOLOGIST: ABNORMAL
PATHOLOGIST: NORMAL
PLATELET ANTIBODY DIRECT IGG: NEGATIVE
PLATELET ANTIBODY DIRECT IGM: NEGATIVE
PROTEIN ELECTROPHORESIS, SERUM: ABNORMAL
SERUM IFX INTERP: NORMAL
TOTAL PROT. SUM,%: 100 % (ref 98–102)
TOTAL PROT. SUM: 7.5 G/DL (ref 6.3–8.2)
TOTAL PROTEIN: 7.5 G/DL (ref 6.4–8.3)

## 2019-04-16 ENCOUNTER — NURSE ONLY (OUTPATIENT)
Dept: PRIMARY CARE CLINIC | Age: 54
End: 2019-04-16

## 2019-04-16 VITALS
TEMPERATURE: 98.9 F | SYSTOLIC BLOOD PRESSURE: 133 MMHG | WEIGHT: 168.6 LBS | BODY MASS INDEX: 26.41 KG/M2 | DIASTOLIC BLOOD PRESSURE: 76 MMHG | HEART RATE: 61 BPM

## 2019-04-16 DIAGNOSIS — I10 ESSENTIAL HYPERTENSION: Primary | ICD-10-CM

## 2019-04-16 LAB
ANTICARDIOLIPIN IGA ANTIBODY: 1.9 APU
ANTICARDIOLIPIN IGG ANTIBODY: 6.4 GPU
CARDIOLIPIN AB IGM: 2.2 MPU
DILUTE RUSSELL VIPER VENOM TIME: NORMAL
INR BLD: 1.1 (ref 0.9–1.2)
LUPUS ANTICOAG: NORMAL
PARTIAL THROMBOPLASTIN TIME: 30.3 SEC (ref 23.2–34.4)
PROTHROMBIN TIME: 11.2 SEC (ref 9.7–12.2)

## 2019-04-25 ENCOUNTER — OFFICE VISIT (OUTPATIENT)
Dept: ONCOLOGY | Age: 54
End: 2019-04-25
Payer: COMMERCIAL

## 2019-04-25 VITALS
TEMPERATURE: 98.9 F | WEIGHT: 168 LBS | RESPIRATION RATE: 18 BRPM | SYSTOLIC BLOOD PRESSURE: 139 MMHG | HEIGHT: 67 IN | DIASTOLIC BLOOD PRESSURE: 76 MMHG | BODY MASS INDEX: 26.37 KG/M2 | HEART RATE: 75 BPM

## 2019-04-25 DIAGNOSIS — D69.6 THROMBOCYTOPENIA (HCC): Primary | ICD-10-CM

## 2019-04-25 DIAGNOSIS — R59.0 INTRA-ABDOMINAL LYMPHADENOPATHY: ICD-10-CM

## 2019-04-25 PROCEDURE — 3017F COLORECTAL CA SCREEN DOC REV: CPT | Performed by: INTERNAL MEDICINE

## 2019-04-25 PROCEDURE — 99214 OFFICE O/P EST MOD 30 MIN: CPT | Performed by: INTERNAL MEDICINE

## 2019-04-25 PROCEDURE — G8419 CALC BMI OUT NRM PARAM NOF/U: HCPCS | Performed by: INTERNAL MEDICINE

## 2019-04-25 PROCEDURE — G8427 DOCREV CUR MEDS BY ELIG CLIN: HCPCS | Performed by: INTERNAL MEDICINE

## 2019-04-25 PROCEDURE — 4004F PT TOBACCO SCREEN RCVD TLK: CPT | Performed by: INTERNAL MEDICINE

## 2019-04-25 ASSESSMENT — ENCOUNTER SYMPTOMS
DIARRHEA: 0
VOMITING: 0
BLOOD IN STOOL: 0
COLOR CHANGE: 0
CONSTIPATION: 0
BACK PAIN: 0
ABDOMINAL PAIN: 0
COUGH: 0
EYE ITCHING: 0
EYE REDNESS: 0
WHEEZING: 0
SHORTNESS OF BREATH: 0
NAUSEA: 0
CHEST TIGHTNESS: 0

## 2019-04-25 NOTE — PROGRESS NOTES
breast exhibits no mass. Abdominal: Soft. She exhibits no mass. There is no hepatosplenomegaly. There is no tenderness. Musculoskeletal: Normal range of motion. She exhibits no edema or tenderness. Lymphadenopathy:     She has no cervical adenopathy. She has no axillary adenopathy. Neurological: She is alert and oriented to person, place, and time. No cranial nerve deficit. Skin: Skin is warm and dry. No cyanosis. Nails show no clubbing. Psychiatric: She has a normal mood and affect. Her behavior is normal. Thought content normal.   Nursing note and vitals reviewed.     Results for orders placed or performed during the hospital encounter of 04/12/19   Vitamin B12 & Folate   Result Value Ref Range    Vitamin B-12 624 232 - 1245 pg/mL    Folate >20.0 >4.8 ng/mL   Soluble transferrin receptor   Result Value Ref Range    Soluble Transferrin Recept 2.4 1.9 - 4.4 mg/L   Lupus Anticoagulant   Result Value Ref Range    Lupus Anticoag NOT REPORTED     Protime 11.2 9.7 - 12.2 sec    INR 1.1 0.9 - 1.2    PTT 30.3 23.2 - 34.4 sec    Anticardiolipin IgG 6.4 <20 GPU    Cardiolipin Ab IgM 2.2 <20 MPU    Anticardiolipin IgA 1.9 <12 APU    Dilute Viper Venom Time NEGATIVE for Lupus Anticoagulant NEGATIVE for Lupus Anticoagulant   Platelet antibodies, direct   Result Value Ref Range    IgG Plt Ab Direct Negative Negative    IgM Plt Ab Direct Negative Negative   Kappa/Lambda Free Lt Chains, Serum Quant   Result Value Ref Range    Kappa Free Light Chains QNT 1.61 0.37 - 1.94 mg/dL    Lambda Free Light Chains QNT 1.15 0.57 - 2.63 mg/dL    Free Kappa/Lambda Ratio 1.40 0.26 - 1.65   Iron and TIBC   Result Value Ref Range    Iron 113 37 - 145 ug/dL    TIBC 405 250 - 450 ug/dL    Iron Saturation 28 20 - 55 %    UIBC 292.1 112 - 347 ug/dL   Lactate Dehydrogenase   Result Value Ref Range     (H) 135 - 214 U/L   Ferritin   Result Value Ref Range    Ferritin 202 (H) 13 - 150 ug/L   Immunoglobulin Panel (IgG, IgA, IgM) Result Value Ref Range    IgG 1189 700 - 1600 mg/dL    IgA 160 70 - 400 mg/dL    IgM 61 40 - 230 mg/dL   Electrophoresis Protein, Serum without Reflex to Immunofixation   Result Value Ref Range    Total Protein 7.5 6.4 - 8.3 g/dL    Albumin (calculated) 5.1 3.2 - 5.2 g/dL    Albumin % 68 (H) 45 - 65 %    Alpha-1-Globulin 0.1 0.1 - 0.4 g/dL    Alpha 1 % 2 (L) 3 - 6 %    Alpha-2-Globulin 0.6 0.5 - 0.9 g/dL    Alpha 2 % 8 6 - 13 %    Beta Globulin 0.7 0.5 - 1.1 g/dL    Beta Percent 9 (L) 11 - 19 %    Gamma Globulin 1.0 0.5 - 1.5 g/dL    Gamma Globulin % 13 9 - 20 %    Total Prot. Sum 7.5 6.3 - 8.2 g/dL    Total Prot. Sum,% 100 98 - 102 %    Protein Electrophoresis, Serum NORMAL ELECTROPHORETIC PATTERN     Pathologist ELECTRONICALLY SIGNED. Grisel Ignacio M.D. Anti-DNA Antibody, Double-Stranded   Result Value Ref Range    Anti ds DNA 40 <100 IU/mL   BRIA   Result Value Ref Range    BRIA NEGATIVE NEGATIVE   Immunofixation serum profile   Result Value Ref Range    Serum IFX Interp       IMMUNOFIXATION IS NEGATIVE FOR MONOCLONAL IMMUNOGLOBULIN. Pathologist ELECTRONICALLY SIGNED. Grisel Ignacio M.D.        ASSESSMENT:      Diagnosis Orders   1. Thrombocytopenia ([facility] [address]     2. Intra-abdominal lymphadenopathy  Ambulatory referral to Interventional Radiology     Patient with our cytopenia and retroperitoneal lymphadenopathy with an limited LDH. I would like to rule out an underlying lymphoproliferative disorder and will schedule her with interventional radiology for a biopsy of the lymph node. See her back after the biopsy. PLAN:      Return for lymphadenopathy,, thrombocytopenia.     Orders Placed This Encounter   Procedures    Ambulatory referral to Interventional Radiology     Referral Priority:   Routine     Referral Type:   Surgical     Referral Reason:   Specialty Services Required     Requested Specialty:   Radiology     Number of Visits Requested:   1     No orders of the defined types were placed in this encounter.           Electronicallysigned by Meg Knowles MD on 4/25/2019 at 2:30 PM

## 2019-04-25 NOTE — LETTER
Head: Normocephalic. Eyes: Pupils are equal, round, and reactive to light. No scleral icterus. Neck: Neck supple. No thyromegaly present. Cardiovascular: Normal rate and regular rhythm. No murmur heard. Pulmonary/Chest: Effort normal and breath sounds normal. No respiratory distress. She has no wheezes. Right breast exhibits no mass. Left breast exhibits no mass. Abdominal: Soft. She exhibits no mass. There is no hepatosplenomegaly. There is no tenderness. Musculoskeletal: Normal range of motion. She exhibits no edema or tenderness. Lymphadenopathy:     She has no cervical adenopathy. She has no axillary adenopathy. Neurological: She is alert and oriented to person, place, and time. No cranial nerve deficit. Skin: Skin is warm and dry. No cyanosis. Nails show no clubbing. Psychiatric: She has a normal mood and affect. Her behavior is normal. Thought content normal.   Nursing note and vitals reviewed.     Results for orders placed or performed during the hospital encounter of 04/12/19   Vitamin B12 & Folate   Result Value Ref Range    Vitamin B-12 624 232 - 1245 pg/mL    Folate >20.0 >4.8 ng/mL   Soluble transferrin receptor   Result Value Ref Range    Soluble Transferrin Recept 2.4 1.9 - 4.4 mg/L   Lupus Anticoagulant   Result Value Ref Range    Lupus Anticoag NOT REPORTED     Protime 11.2 9.7 - 12.2 sec    INR 1.1 0.9 - 1.2    PTT 30.3 23.2 - 34.4 sec    Anticardiolipin IgG 6.4 <20 GPU    Cardiolipin Ab IgM 2.2 <20 MPU    Anticardiolipin IgA 1.9 <12 APU    Dilute Viper Venom Time NEGATIVE for Lupus Anticoagulant NEGATIVE for Lupus Anticoagulant   Platelet antibodies, direct   Result Value Ref Range    IgG Plt Ab Direct Negative Negative    IgM Plt Ab Direct Negative Negative   Kappa/Lambda Free Lt Chains, Serum Quant   Result Value Ref Range    Kappa Free Light Chains QNT 1.61 0.37 - 1.94 mg/dL    Lambda Free Light Chains QNT 1.15 0.57 - 2.63 mg/dL Free Kappa/Lambda Ratio 1.40 0.26 - 1.65   Iron and TIBC   Result Value Ref Range    Iron 113 37 - 145 ug/dL    TIBC 405 250 - 450 ug/dL    Iron Saturation 28 20 - 55 %    UIBC 292.1 112 - 347 ug/dL   Lactate Dehydrogenase   Result Value Ref Range     (H) 135 - 214 U/L   Ferritin   Result Value Ref Range    Ferritin 202 (H) 13 - 150 ug/L   Immunoglobulin Panel (IgG, IgA, IgM)   Result Value Ref Range    IgG 1189 700 - 1600 mg/dL    IgA 160 70 - 400 mg/dL    IgM 61 40 - 230 mg/dL   Electrophoresis Protein, Serum without Reflex to Immunofixation   Result Value Ref Range    Total Protein 7.5 6.4 - 8.3 g/dL    Albumin (calculated) 5.1 3.2 - 5.2 g/dL    Albumin % 68 (H) 45 - 65 %    Alpha-1-Globulin 0.1 0.1 - 0.4 g/dL    Alpha 1 % 2 (L) 3 - 6 %    Alpha-2-Globulin 0.6 0.5 - 0.9 g/dL    Alpha 2 % 8 6 - 13 %    Beta Globulin 0.7 0.5 - 1.1 g/dL    Beta Percent 9 (L) 11 - 19 %    Gamma Globulin 1.0 0.5 - 1.5 g/dL    Gamma Globulin % 13 9 - 20 %    Total Prot. Sum 7.5 6.3 - 8.2 g/dL    Total Prot. Sum,% 100 98 - 102 %    Protein Electrophoresis, Serum NORMAL ELECTROPHORETIC PATTERN     Pathologist ELECTRONICALLY SIGNED. Elizabeth Whitaker M.D. Anti-DNA Antibody, Double-Stranded   Result Value Ref Range    Anti ds DNA 40 <100 IU/mL   BRIA   Result Value Ref Range    BRIA NEGATIVE NEGATIVE   Immunofixation serum profile   Result Value Ref Range    Serum IFX Interp       IMMUNOFIXATION IS NEGATIVE FOR MONOCLONAL IMMUNOGLOBULIN. Pathologist ELECTRONICALLY SIGNED. Elizabeth Whitaker M.D.        ASSESSMENT:      Diagnosis Orders   1. Thrombocytopenia ([address]     2. Intra-abdominal lymphadenopathy  Ambulatory referral to Interventional Radiology     Patient with our cytopenia and retroperitoneal lymphadenopathy with an limited LDH. I would like to rule out an underlying lymphoproliferative disorder and will schedule her with interventional radiology for a biopsy of the lymph node. See her back after the biopsy.

## 2019-04-30 ENCOUNTER — OFFICE VISIT (OUTPATIENT)
Dept: PRIMARY CARE CLINIC | Age: 54
End: 2019-04-30
Payer: COMMERCIAL

## 2019-04-30 VITALS
RESPIRATION RATE: 20 BRPM | WEIGHT: 169 LBS | DIASTOLIC BLOOD PRESSURE: 68 MMHG | SYSTOLIC BLOOD PRESSURE: 119 MMHG | TEMPERATURE: 97.8 F | BODY MASS INDEX: 26.47 KG/M2 | HEART RATE: 80 BPM | OXYGEN SATURATION: 98 %

## 2019-04-30 DIAGNOSIS — F34.1 DYSTHYMIA: ICD-10-CM

## 2019-04-30 DIAGNOSIS — I10 ESSENTIAL HYPERTENSION: Primary | ICD-10-CM

## 2019-04-30 PROCEDURE — 4004F PT TOBACCO SCREEN RCVD TLK: CPT | Performed by: NURSE PRACTITIONER

## 2019-04-30 PROCEDURE — 99214 OFFICE O/P EST MOD 30 MIN: CPT | Performed by: NURSE PRACTITIONER

## 2019-04-30 PROCEDURE — G8419 CALC BMI OUT NRM PARAM NOF/U: HCPCS | Performed by: NURSE PRACTITIONER

## 2019-04-30 PROCEDURE — G8427 DOCREV CUR MEDS BY ELIG CLIN: HCPCS | Performed by: NURSE PRACTITIONER

## 2019-04-30 PROCEDURE — 3017F COLORECTAL CA SCREEN DOC REV: CPT | Performed by: NURSE PRACTITIONER

## 2019-04-30 RX ORDER — SERTRALINE HYDROCHLORIDE 25 MG/1
25 TABLET, FILM COATED ORAL DAILY
Qty: 90 TABLET | Refills: 1 | Status: SHIPPED | OUTPATIENT
Start: 2019-04-30 | End: 2019-11-14 | Stop reason: SDUPTHER

## 2019-04-30 ASSESSMENT — ENCOUNTER SYMPTOMS
DIARRHEA: 0
CONSTIPATION: 0
NAUSEA: 0
RHINORRHEA: 0
WHEEZING: 0
VOMITING: 0
COUGH: 0
SINUS PRESSURE: 0
SHORTNESS OF BREATH: 0

## 2019-04-30 NOTE — PATIENT INSTRUCTIONS
Patient Education      Patient Education        Insomnia: Care Instructions  Your Care Instructions    Insomnia is the inability to sleep well. It is a common problem for most people at some time. Insomnia may make it hard for you to get to sleep, stay asleep, or sleep as long as you need to. This can make you tired and grouchy during the day. It can also make you forgetful, less effective at work, and unhappy. Insomnia can be caused by conditions such as depression or anxiety. Pain can also affect your ability to sleep. When these problems are solved, the insomnia usually clears up. But sometimes bad sleep habits can cause insomnia. If insomnia is affecting your work or your enjoyment of life, you can take steps to improve your sleep. Follow-up care is a key part of your treatment and safety. Be sure to make and go to all appointments, and call your doctor if you are having problems. It's also a good idea to know your test results and keep a list of the medicines you take. How can you care for yourself at home? What to avoid  · Do not have drinks with caffeine, such as coffee or black tea, for 8 hours before bed. · Do not smoke or use other types of tobacco near bedtime. Nicotine is a stimulant and can keep you awake. · Avoid drinking alcohol late in the evening, because it can cause you to wake in the middle of the night. · Do not eat a big meal close to bedtime. If you are hungry, eat a light snack. · Do not drink a lot of water close to bedtime, because the need to urinate may wake you up during the night. · Do not read or watch TV in bed. Use the bed only for sleeping and sexual activity. What to try  · Go to bed at the same time every night, and wake up at the same time every morning. Do not take naps during the day. · Keep your bedroom quiet, dark, and cool. · Sleep on a comfortable pillow and mattress.   · If watching the clock makes you anxious, turn it facing away from you so you cannot see the time. · If you worry when you lie down, start a worry book. Well before bedtime, write down your worries, and then set the book and your concerns aside. · Try meditation or other relaxation techniques before you go to bed. · If you cannot fall asleep, get up and go to another room until you feel sleepy. Do something relaxing. Repeat your bedtime routine before you go to bed again. · Make your house quiet and calm about an hour before bedtime. Turn down the lights, turn off the TV, log off the computer, and turn down the volume on music. This can help you relax after a busy day. When should you call for help? Watch closely for changes in your health, and be sure to contact your doctor if:    · Your efforts to improve your sleep do not work.     · Your insomnia gets worse.     · You have been feeling down, depressed, or hopeless or have lost interest in things that you usually enjoy. Where can you learn more? Go to https://Trax Technology Solutionspeitembasebhavna."Nagisa,inc.". org and sign in to your [facility] account. Enter P513 in the NATIONSPLAY box to learn more about \"Insomnia: Care Instructions. \"     If you do not have an account, please click on the \"Sign Up Now\" link. Current as of: June 28, 2018  Content Version: 11.9  © 7166-7684 [employer]. Care instructions adapted under license by [facility] If you have questions about a medical condition or this instruction, always ask your healthcare professional. [place] [address] any warranty or liability for your use of this information. Learning About High Blood Pressure  What is high blood pressure? Blood pressure is a measure of how hard the blood pushes against the walls of your arteries. It's normal for blood pressure to go up and down throughout the day, but if it stays up, you have high blood pressure. Another name for high blood pressure is hypertension. Two numbers tell you your blood pressure.  The first number is the systolic pressure. It shows how hard the blood pushes when your heart is pumping. The second number is the diastolic pressure. It shows how hard the blood pushes between heartbeats, when your heart is relaxed and filling with blood. Your doctor will give you a goal for your blood pressure. Your goal will be based on your health and your age. High blood pressure (hypertension) means that the top number stays high, or the bottom number stays high, or both. High blood pressure increases the risk of stroke, heart attack, and other problems. You and your doctor will talk about your risks of these problems based on your blood pressure. What happens when you have high blood pressure? · Blood flows through your arteries with too much force. Over time, this damages the walls of your arteries. But you can't feel it. High blood pressure usually doesn't cause symptoms. · Fat and calcium start to build up in your arteries. This buildup is called plaque. Plaque makes your arteries narrower and stiffer. Blood can't flow through them as easily. · This lack of good blood flow starts to damage some of the organs in your body. This can lead to problems such as coronary artery disease and heart attack, heart failure, stroke, kidney failure, and eye damage. How can you prevent high blood pressure? · Stay at a healthy weight. · Try to limit how much sodium you eat to less than 2,300 milligrams (mg) a day. If you limit your sodium to 1,500 mg a day, you can lower your blood pressure even more. ? Buy foods that are labeled \"unsalted,\" \"sodium-free,\" or \"low-sodium. \" Foods labeled \"reduced-sodium\" and \"light sodium\" may still have too much sodium. ? Flavor your food with garlic, lemon juice, onion, vinegar, herbs, and spices instead of salt. Do not use soy sauce, steak sauce, onion salt, garlic salt, mustard, or ketchup on your food. ? Use less salt (or none) when recipes call for it.  You can often use half the salt a recipe calls for without losing flavor. · Be physically active. Get at least 30 minutes of exercise on most days of the week. Walking is a good choice. You also may want to do other activities, such as running, swimming, cycling, or playing tennis or team sports. · Limit alcohol to 2 drinks a day for men and 1 drink a day for women. · Eat plenty of fruits, vegetables, and low-fat dairy products. Eat less saturated and total fats. How is high blood pressure treated? · Your doctor will suggest making lifestyle changes. For example, your doctor may ask you to eat healthy foods, quit smoking, lose extra weight, and be more active. · If lifestyle changes don't help enough, your doctor may recommend that you take medicine. · When blood pressure is very high, medicines are needed to lower it. Follow-up care is a key part of your treatment and safety. Be sure to make and go to all appointments, and call your doctor if you are having problems. It's also a good idea to know your test results and keep a list of the medicines you take. Where can you learn more? Go to https://VoucherlinkpeServeron.Macrocosm. org and sign in to your [employer] account. Enter P501 in the [employer] box to learn more about \"Learning About High Blood Pressure. \"     If you do not have an account, please click on the \"Sign Up Now\" link. Current as of: July 22, 2018  Content Version: 11.9  © 9793-0254 [employer]. Care instructions adapted under license by [facility] ([facility]). If you have questions about a medical condition or this instruction, always ask your healthcare professional. [address] any warranty or liability for your use of this information. SURVEY:    You may be receiving a survey from [employer] regarding your visit today. Please complete the survey to enable us to provide the highest quality of care to you and your family.     If you cannot score us a very good on any question, please call the office to discuss how we could have made your experience a very good one. Thank you.

## 2019-05-02 ENCOUNTER — TELEPHONE (OUTPATIENT)
Dept: ONCOLOGY | Age: 54
End: 2019-05-02

## 2019-05-02 DIAGNOSIS — D69.6 THROMBOCYTOPENIA (HCC): Primary | ICD-10-CM

## 2019-05-02 DIAGNOSIS — R74.02 ELEVATED LDH: ICD-10-CM

## 2019-05-02 NOTE — TELEPHONE ENCOUNTER
IR called and stated that lymph nodes are to small to biopsy safely. Discussed with Dr Andrey Cruz and will bring patient back in 3 months with repeat labs. Called and spoke with Navi Brown. Follow up appointment made. Told to have labs completed prior to return appointment. Patient told to call if any changes or problems occur.

## 2019-07-03 DIAGNOSIS — I10 ESSENTIAL HYPERTENSION: ICD-10-CM

## 2019-07-03 RX ORDER — LISINOPRIL 10 MG/1
10 TABLET ORAL DAILY
Qty: 90 TABLET | Refills: 0 | Status: SHIPPED | OUTPATIENT
Start: 2019-07-03 | End: 2019-10-07 | Stop reason: SDUPTHER

## 2019-07-03 NOTE — TELEPHONE ENCOUNTER
Needs refilled.   Health Maintenance   Topic Date Due    DTaP/Tdap/Td vaccine (1 - Tdap) 04/02/2020 (Originally 5/19/1985)    Breast cancer screen  04/02/2020 (Originally 5/19/2016)    Cervical cancer screen  04/02/2020 (Originally 5/19/1987)    Shingles Vaccine (1 of 2) 04/02/2020 (Originally 5/19/2016)    Pneumococcal 0-64 years Vaccine (1 of 1 - PPSV23) 04/02/2020 (Originally 5/19/1972)    HIV screen  04/30/2020 (Originally 5/19/1981)    Flu vaccine (1) 09/01/2019    Colon Cancer Screen FIT/FOBT  04/03/2020    Potassium monitoring  04/03/2020    Creatinine monitoring  04/03/2020    Lipid screen  04/03/2024             (applicable per patient's age: Cancer Screenings, Depression Screening, Fall Risk Screening, Immunizations)    LDL Cholesterol (mg/dL)   Date Value   04/03/2019 84     AST (U/L)   Date Value   04/03/2019 16     ALT (U/L)   Date Value   04/03/2019 16     BUN (mg/dL)   Date Value   04/03/2019 13      (goal A1C is < 7)   (goal LDL is <100) need 30-50% reduction from baseline     BP Readings from Last 3 Encounters:   04/30/19 119/68   04/25/19 139/76   04/16/19 133/76    (goal /80)      All Future Testing planned in [facility]:  Lab Frequency Next Occurrence   CBC Auto Differential Once 07/22/2019   Lactate Dehydrogenase Once 07/22/2019   Comprehensive Metabolic Panel Once 56/21/6445       Next Visit Date:  Future Appointments   Date Time Provider Jeramy Barney   7/30/2019  2:00 PM Charli Mathews, APRN - CNP [facility]   8/5/2019  1:00 PM Carolyn Sanderson MD [facility]            Patient Active Problem List:     Thrombocytopenia ([address]

## 2019-07-30 ENCOUNTER — OFFICE VISIT (OUTPATIENT)
Dept: PRIMARY CARE CLINIC | Age: 54
End: 2019-07-30
Payer: COMMERCIAL

## 2019-07-30 VITALS
DIASTOLIC BLOOD PRESSURE: 77 MMHG | BODY MASS INDEX: 25.73 KG/M2 | SYSTOLIC BLOOD PRESSURE: 123 MMHG | TEMPERATURE: 97.8 F | WEIGHT: 164.3 LBS | HEART RATE: 74 BPM | OXYGEN SATURATION: 98 % | RESPIRATION RATE: 20 BRPM

## 2019-07-30 DIAGNOSIS — I10 ESSENTIAL HYPERTENSION: Primary | ICD-10-CM

## 2019-07-30 DIAGNOSIS — F34.1 DYSTHYMIA: ICD-10-CM

## 2019-07-30 DIAGNOSIS — D69.6 THROMBOCYTOPENIA (HCC): ICD-10-CM

## 2019-07-30 PROCEDURE — 3017F COLORECTAL CA SCREEN DOC REV: CPT | Performed by: NURSE PRACTITIONER

## 2019-07-30 PROCEDURE — G8419 CALC BMI OUT NRM PARAM NOF/U: HCPCS | Performed by: NURSE PRACTITIONER

## 2019-07-30 PROCEDURE — 4004F PT TOBACCO SCREEN RCVD TLK: CPT | Performed by: NURSE PRACTITIONER

## 2019-07-30 PROCEDURE — G8427 DOCREV CUR MEDS BY ELIG CLIN: HCPCS | Performed by: NURSE PRACTITIONER

## 2019-07-30 PROCEDURE — 99214 OFFICE O/P EST MOD 30 MIN: CPT | Performed by: NURSE PRACTITIONER

## 2019-07-30 ASSESSMENT — ENCOUNTER SYMPTOMS
SHORTNESS OF BREATH: 0
WHEEZING: 0
DIARRHEA: 0
RHINORRHEA: 0
CONSTIPATION: 0
COUGH: 0
VOMITING: 0
ORTHOPNEA: 0
NAUSEA: 0
SINUS PRESSURE: 0
SORE THROAT: 0

## 2019-07-30 NOTE — PATIENT INSTRUCTIONS
SURVEY:    You may be receiving a survey from [employer] regarding your visit today. Please complete the survey to enable us to provide the highest quality of care to you and your family. If you cannot score us a very good on any question, please call the office to discuss how we could have made your experience a very good one. Thank you. Patient Education        High Blood Pressure: Care Instructions  Overview    It's normal for blood pressure to go up and down throughout the day. But if it stays up, you have high blood pressure. Another name for high blood pressure is hypertension. Despite what a lot of people think, high blood pressure usually doesn't cause headaches or make you feel dizzy or lightheaded. It usually has no symptoms. But it does increase your risk of stroke, heart attack, and other problems. You and your doctor will talk about your risks of these problems based on your blood pressure. Your doctor will give you a goal for your blood pressure. Your goal will be based on your health and your age. Lifestyle changes, such as eating healthy and being active, are always important to help lower blood pressure. You might also take medicine to reach your blood pressure goal.  Follow-up care is a key part of your treatment and safety. Be sure to make and go to all appointments, and call your doctor if you are having problems. It's also a good idea to know your test results and keep a list of the medicines you take. How can you care for yourself at home? Medical treatment  · If you stop taking your medicine, your blood pressure will go back up. You may take one or more types of medicine to lower your blood pressure. Be safe with medicines. Take your medicine exactly as prescribed. Call your doctor if you think you are having a problem with your medicine. · Talk to your doctor before you start taking aspirin every day.  Aspirin can help certain people lower their risk of a heart attack or

## 2019-07-30 NOTE — PROGRESS NOTES
Name: Franklin Velez  : 1965         Chief Complaint:     Chief Complaint   Patient presents with   [address] Problem     \" does not feel like I am taking anything\", less depressed.  Hypertension     was taking at work, did not bring along       History of Present Illness:      Franklin Velez is a 49 y.o.  female who presents with Mental Health Problem (\" does not feel like I am taking anything\", less depressed.) and Hypertension (was taking at work, did not bring along)      Liv Quijano is here today for a routine office visit. She is feeling well and does not have any concerns at this time. Anxiety/depression- Since starting Zoloft she feel like it has \"definitely\"  helped with stress, she has more energy and she does not get as angry as easily. She continues to have some insomnia which has been a chronic problem for her. She feels like 25 mg of the Zoloft is the right dose for her and denies any side effects. 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 She has never really tried anything for this however she does have melatonin at home and has considered trying this. Thrombocytopenia- last CBC in April showed platelet count of 351. She was referred to see Dr. Severo German who saw her in May and added additional blood work. She also was referred to Dr. Severo German due to pelvic and abdominal adenopathy. Dr. Severo German had referred her to IR for biopsy however the lymph nodes were too small. She will follow-up with Dr. Severo German next Monday. She denies any abdominal pain, nosebleeds, easy bruising, bright blood per rectum or dark tarry stools. Mental Health Problem   The primary symptoms include dysphoric mood (well controlled on Zoloft ). The current episode started more than 1 month ago. This is a chronic problem. The onset of the illness is precipitated by emotional stress and a stressful event.  Additional symptoms of the illness include insomnia and agitation (controlled with

## 2019-08-02 ENCOUNTER — HOSPITAL ENCOUNTER (OUTPATIENT)
Age: 54
Discharge: HOME | End: 2019-08-02
Payer: COMMERCIAL

## 2019-08-02 DIAGNOSIS — R74.02 ELEVATED LDH: ICD-10-CM

## 2019-08-02 DIAGNOSIS — D69.6 THROMBOCYTOPENIA (HCC): ICD-10-CM

## 2019-08-02 LAB
ABSOLUTE EOS #: 0.15 K/UL (ref 0–0.44)
ABSOLUTE IMMATURE GRANULOCYTE: <0.03 K/UL (ref 0–0.3)
ABSOLUTE LYMPH #: 0.99 K/UL (ref 1.1–3.7)
ABSOLUTE MONO #: 0.34 K/UL (ref 0.1–1.2)
ALBUMIN SERPL-MCNC: 4.6 G/DL (ref 3.5–5.2)
ALBUMIN/GLOBULIN RATIO: 1.7 (ref 1–2.5)
ALP BLD-CCNC: 56 U/L (ref 35–104)
ALT SERPL-CCNC: 18 U/L (ref 5–33)
ANION GAP SERPL CALCULATED.3IONS-SCNC: 8 MMOL/L (ref 9–17)
AST SERPL-CCNC: 18 U/L
BASOPHILS # BLD: 1 % (ref 0–2)
BASOPHILS ABSOLUTE: 0.03 K/UL (ref 0–0.2)
BILIRUB SERPL-MCNC: 0.61 MG/DL (ref 0.3–1.2)
BUN BLDV-MCNC: 17 MG/DL (ref 6–20)
BUN/CREAT BLD: 19 (ref 9–20)
CALCIUM SERPL-MCNC: 9.9 MG/DL (ref 8.6–10.4)
CHLORIDE BLD-SCNC: 106 MMOL/L (ref 98–107)
CO2: 29 MMOL/L (ref 20–31)
CREAT SERPL-MCNC: 0.89 MG/DL (ref 0.5–0.9)
DIFFERENTIAL TYPE: ABNORMAL
EOSINOPHILS RELATIVE PERCENT: 3 % (ref 1–4)
GFR AFRICAN AMERICAN: >60 ML/MIN
GFR NON-AFRICAN AMERICAN: >60 ML/MIN
GFR SERPL CREATININE-BSD FRML MDRD: ABNORMAL ML/MIN/{1.73_M2}
GFR SERPL CREATININE-BSD FRML MDRD: ABNORMAL ML/MIN/{1.73_M2}
GLUCOSE BLD-MCNC: 77 MG/DL (ref 70–99)
HCT VFR BLD CALC: 41.5 % (ref 36.3–47.1)
HEMOGLOBIN: 13.6 G/DL (ref 11.9–15.1)
IMMATURE GRANULOCYTES: 0 %
LACTATE DEHYDROGENASE: 169 U/L (ref 135–214)
LYMPHOCYTES # BLD: 20 % (ref 24–43)
MCH RBC QN AUTO: 29.9 PG (ref 25.2–33.5)
MCHC RBC AUTO-ENTMCNC: 32.8 G/DL (ref 28.4–34.8)
MCV RBC AUTO: 91.2 FL (ref 82.6–102.9)
MONOCYTES # BLD: 7 % (ref 3–12)
NRBC AUTOMATED: 0 PER 100 WBC
PDW BLD-RTO: 12 % (ref 11.8–14.4)
PLATELET # BLD: 108 K/UL (ref 138–453)
PLATELET ESTIMATE: ABNORMAL
PMV BLD AUTO: 12.9 FL (ref 8.1–13.5)
POTASSIUM SERPL-SCNC: 4.4 MMOL/L (ref 3.7–5.3)
RBC # BLD: 4.55 M/UL (ref 3.95–5.11)
RBC # BLD: ABNORMAL 10*6/UL
SEG NEUTROPHILS: 69 % (ref 36–65)
SEGMENTED NEUTROPHILS ABSOLUTE COUNT: 3.39 K/UL (ref 1.5–8.1)
SODIUM BLD-SCNC: 143 MMOL/L (ref 135–144)
TOTAL PROTEIN: 7.3 G/DL (ref 6.4–8.3)
WBC # BLD: 4.9 K/UL (ref 3.5–11.3)
WBC # BLD: ABNORMAL 10*3/UL

## 2019-08-02 PROCEDURE — 80053 COMPREHEN METABOLIC PANEL: CPT

## 2019-08-02 PROCEDURE — 85025 COMPLETE CBC W/AUTO DIFF WBC: CPT

## 2019-08-02 PROCEDURE — 83615 LACTATE (LD) (LDH) ENZYME: CPT

## 2019-08-02 PROCEDURE — 36415 COLL VENOUS BLD VENIPUNCTURE: CPT

## 2019-08-27 ENCOUNTER — TELEPHONE (OUTPATIENT)
Dept: PRIMARY CARE CLINIC | Age: 54
End: 2019-08-27

## 2019-08-27 DIAGNOSIS — Z12.31 SCREENING MAMMOGRAM, ENCOUNTER FOR: Primary | ICD-10-CM

## 2019-08-28 ENCOUNTER — TELEPHONE (OUTPATIENT)
Dept: PRIMARY CARE CLINIC | Age: 54
End: 2019-08-28

## 2019-09-05 ENCOUNTER — OFFICE VISIT (OUTPATIENT)
Dept: ONCOLOGY | Age: 54
End: 2019-09-05
Payer: COMMERCIAL

## 2019-09-05 VITALS
SYSTOLIC BLOOD PRESSURE: 135 MMHG | BODY MASS INDEX: 26.06 KG/M2 | HEIGHT: 67 IN | DIASTOLIC BLOOD PRESSURE: 75 MMHG | RESPIRATION RATE: 20 BRPM | HEART RATE: 76 BPM | TEMPERATURE: 98.7 F | WEIGHT: 166 LBS

## 2019-09-05 DIAGNOSIS — D69.6 THROMBOCYTOPENIA (HCC): Primary | ICD-10-CM

## 2019-09-05 PROCEDURE — 99213 OFFICE O/P EST LOW 20 MIN: CPT | Performed by: INTERNAL MEDICINE

## 2019-09-05 PROCEDURE — G8419 CALC BMI OUT NRM PARAM NOF/U: HCPCS | Performed by: INTERNAL MEDICINE

## 2019-09-05 PROCEDURE — 3017F COLORECTAL CA SCREEN DOC REV: CPT | Performed by: INTERNAL MEDICINE

## 2019-09-05 PROCEDURE — G8427 DOCREV CUR MEDS BY ELIG CLIN: HCPCS | Performed by: INTERNAL MEDICINE

## 2019-09-05 PROCEDURE — 4004F PT TOBACCO SCREEN RCVD TLK: CPT | Performed by: INTERNAL MEDICINE

## 2019-09-05 ASSESSMENT — ENCOUNTER SYMPTOMS
CHEST TIGHTNESS: 0
ABDOMINAL PAIN: 0
EYE REDNESS: 0
BLOOD IN STOOL: 0
EYE ITCHING: 0
COUGH: 0
DIARRHEA: 0
BACK PAIN: 0
CONSTIPATION: 0
NAUSEA: 0
VOMITING: 0
WHEEZING: 0
SHORTNESS OF BREATH: 0
COLOR CHANGE: 0

## 2019-09-05 NOTE — PROGRESS NOTES
[facility]  [facility]  [address]  [address]  Dept: [phone]  Dept Fax: [phone]  Eddie Golden is a 49 y.o. female who presentstoday for follow up of her   Chief Complaint   Patient presents with    Follow-up     thrombocytopenia         HPI  Bibi Bryan is a 52-year-old lady who has had thrombocytopenia for quite some time. Her recent platelet count is [phone]. In 2016 she had a CAT scan of the abdomen and pelvis when she went to the emergency room and was sick after her son . The scan showed multiple nodules and masses scattered throughout the retroperitoneum and mesentery area the largest one was 2.4 x 3.7 cm in size. It also showed hepatomegaly. She never followed up for that and was referred to me for evaluation of thrombocytopenia. She denies any fevers but she states she's had night sweats for years. She has not lost any weight. She has family history of leukemia. She also has depression and was recently started on Paxil. She smokes one half pack of cigarettes a day and is cutting down on it now. She does not drink any alcohol but used to drink heavy when she was a teenager. Thrombocytopenia workup was negative but her LDH was found to be in the 400 range. She states that she does not have any more night sweats and a CAT scan of the abdomen and pelvis showed that the lymph nodes were smaller but she still has them in the largest one was 1.7 cm in size. They are not accessible by interventional radiology. Current Outpatient Medications   Medication Sig Dispense Refill    lisinopril (PRINIVIL;ZESTRIL) 10 MG tablet Take 1 tablet by mouth daily 90 tablet 0    sertraline (ZOLOFT) 25 MG tablet Take 1 tablet by mouth daily 90 tablet 1     No current facility-administered medications for this visit.         No Known Allergies    Past Medical History:   Diagnosis Date    GERD (gastroesophageal reflux disease)     Headache     Insomnia

## 2019-09-05 NOTE — LETTER
90 tablet 0    sertraline (ZOLOFT) 25 MG tablet Take 1 tablet by mouth daily 90 tablet 1     No current facility-administered medications for this visit. No Known Allergies    Past Medical History:   Diagnosis Date    GERD (gastroesophageal reflux disease)     Headache     Insomnia         History reviewed. No pertinent surgical history. History reviewed. No pertinent family history. Social History     Tobacco Use    Smoking status: Current Every Day Smoker    Smokeless tobacco: Never Used   Substance Use Topics    Alcohol use: No          The Past MedicalHistory, Past Surgical History, Past Family History and Past Social History havebeen reviewed      Review of Systems   Constitutional: Negative for activity change, appetite change, chills, diaphoresis, fatigue, fever and unexpected weight change. HENT: Negative for congestion, hearing loss, mouth sores and nosebleeds. Eyes: Negative for redness, itching and visual disturbance. Respiratory: Negative for cough, chest tightness, shortness of breath and wheezing. Cardiovascular: Negative for chest pain, palpitations and leg swelling. Gastrointestinal: Negative for abdominal pain, blood in stool, constipation, diarrhea, nausea and vomiting. Genitourinary: Negative for decreased urine volume, difficulty urinating, dysuria, flank pain, frequency, hematuria, pelvic pain and urgency. Musculoskeletal: Negative for arthralgias, back pain, joint swelling and myalgias. Skin: Negative for color change, pallor and rash. Neurological: Negative for dizziness, seizures, syncope, weakness, light-headedness, numbness and headaches. Hematological: Negative for adenopathy. Does not bruise/bleed easily. Psychiatric/Behavioral: Negative for agitation, behavioral problems and confusion. Physical Exam   Constitutional: She is oriented to person, place, and time. She appears well-developed and well-nourished. No distress.    HENT:

## 2019-09-12 ENCOUNTER — HOSPITAL ENCOUNTER (OUTPATIENT)
Dept: WOMENS IMAGING | Age: 54
Discharge: HOME | End: 2019-09-14
Payer: COMMERCIAL

## 2019-09-12 ENCOUNTER — TELEPHONE (OUTPATIENT)
Dept: PRIMARY CARE CLINIC | Age: 54
End: 2019-09-12

## 2019-09-12 DIAGNOSIS — Z12.31 SCREENING MAMMOGRAM, ENCOUNTER FOR: ICD-10-CM

## 2019-09-12 PROCEDURE — 77063 BREAST TOMOSYNTHESIS BI: CPT

## 2019-09-30 ENCOUNTER — HOSPITAL ENCOUNTER (OUTPATIENT)
Dept: INFUSION THERAPY | Age: 54
Discharge: HOME | End: 2019-09-30
Payer: COMMERCIAL

## 2019-09-30 ENCOUNTER — OFFICE VISIT (OUTPATIENT)
Dept: ONCOLOGY | Age: 54
End: 2019-09-30
Payer: COMMERCIAL

## 2019-09-30 VITALS
TEMPERATURE: 98 F | RESPIRATION RATE: 16 BRPM | DIASTOLIC BLOOD PRESSURE: 68 MMHG | HEART RATE: 80 BPM | SYSTOLIC BLOOD PRESSURE: 129 MMHG | WEIGHT: 167 LBS | BODY MASS INDEX: 26.16 KG/M2

## 2019-09-30 DIAGNOSIS — D69.6 THROMBOCYTOPENIA (HCC): ICD-10-CM

## 2019-09-30 DIAGNOSIS — D69.6 THROMBOCYTOPENIA (HCC): Primary | ICD-10-CM

## 2019-09-30 LAB
ABSOLUTE EOS #: 0.16 K/UL (ref 0–0.44)
ABSOLUTE IMMATURE GRANULOCYTE: 0 K/UL (ref 0–0.3)
ABSOLUTE LYMPH #: 1.24 K/UL (ref 1.1–3.7)
ABSOLUTE MONO #: 0.36 K/UL (ref 0.1–1.2)
ABSOLUTE RETIC #: 0.06 M/UL (ref 0.03–0.08)
BASOPHILS # BLD: 1 % (ref 0–2)
BASOPHILS ABSOLUTE: 0.04 K/UL (ref 0–0.2)
DIFFERENTIAL TYPE: NORMAL
EOSINOPHILS RELATIVE PERCENT: 4 % (ref 1–4)
HCT VFR BLD CALC: 41.2 % (ref 36.3–47.1)
HEMOGLOBIN: 13.6 G/DL (ref 11.9–15.1)
IMMATURE GRANULOCYTES: 0 %
IMMATURE RETIC FRACT: 7.8 % (ref 2.7–18.3)
LYMPHOCYTES # BLD: 31 % (ref 24–43)
MCH RBC QN AUTO: 29.8 PG (ref 25.2–33.5)
MCHC RBC AUTO-ENTMCNC: 33 G/DL (ref 28.4–34.8)
MCV RBC AUTO: 90.4 FL (ref 82.6–102.9)
MONOCYTES # BLD: 9 % (ref 3–12)
MORPHOLOGY: NORMAL
NRBC AUTOMATED: 0 PER 100 WBC
PDW BLD-RTO: 12.1 % (ref 11.8–14.4)
PLATELET # BLD: NORMAL K/UL (ref 138–453)
PLATELET ESTIMATE: NORMAL
PLATELET, FLUORESCENCE: 92 K/UL (ref 138–453)
PLATELET, IMMATURE FRACTION: 13.6 % (ref 1.1–10.3)
PMV BLD AUTO: NORMAL FL (ref 8.1–13.5)
RBC # BLD: 4.56 M/UL (ref 3.95–5.11)
RBC # BLD: NORMAL 10*6/UL
RETIC %: 1.3 % (ref 0.5–1.9)
RETIC HEMOGLOBIN: 36.6 PG (ref 28.2–35.7)
SEG NEUTROPHILS: 55 % (ref 36–65)
SEGMENTED NEUTROPHILS ABSOLUTE COUNT: 2.2 K/UL (ref 1.5–8.1)
WBC # BLD: 4 K/UL (ref 3.5–11.3)
WBC # BLD: NORMAL 10*3/UL

## 2019-09-30 PROCEDURE — 88237 TISSUE CULTURE BONE MARROW: CPT

## 2019-09-30 PROCEDURE — 38222 DX BONE MARROW BX & ASPIR: CPT | Performed by: INTERNAL MEDICINE

## 2019-09-30 PROCEDURE — 88185 FLOWCYTOMETRY/TC ADD-ON: CPT

## 2019-09-30 PROCEDURE — 88313 SPECIAL STAINS GROUP 2: CPT

## 2019-09-30 PROCEDURE — 88264 CHROMOSOME ANALYSIS 20-25: CPT

## 2019-09-30 PROCEDURE — 85025 COMPLETE CBC W/AUTO DIFF WBC: CPT

## 2019-09-30 PROCEDURE — 88280 CHROMOSOME KARYOTYPE STUDY: CPT

## 2019-09-30 PROCEDURE — 88184 FLOWCYTOMETRY/ TC 1 MARKER: CPT

## 2019-09-30 PROCEDURE — 85045 AUTOMATED RETICULOCYTE COUNT: CPT

## 2019-09-30 PROCEDURE — 88311 DECALCIFY TISSUE: CPT

## 2019-09-30 PROCEDURE — 88305 TISSUE EXAM BY PATHOLOGIST: CPT

## 2019-09-30 PROCEDURE — 85055 RETICULATED PLATELET ASSAY: CPT

## 2019-10-02 LAB — BONE MARROW REPORT: NORMAL

## 2019-10-03 LAB — FLOW CYTOMETRY, BM: NORMAL

## 2019-10-04 LAB — CHROMOSOME STUDY: NORMAL

## 2019-10-07 DIAGNOSIS — I10 ESSENTIAL HYPERTENSION: ICD-10-CM

## 2019-10-07 RX ORDER — LISINOPRIL 10 MG/1
TABLET ORAL
Qty: 90 TABLET | Refills: 0 | Status: SHIPPED | OUTPATIENT
Start: 2019-10-07 | End: 2020-01-10 | Stop reason: SDUPTHER

## 2019-10-24 ENCOUNTER — OFFICE VISIT (OUTPATIENT)
Dept: PRIMARY CARE CLINIC | Age: 54
End: 2019-10-24
Payer: COMMERCIAL

## 2019-10-24 VITALS
SYSTOLIC BLOOD PRESSURE: 117 MMHG | HEART RATE: 75 BPM | OXYGEN SATURATION: 99 % | WEIGHT: 171.1 LBS | BODY MASS INDEX: 26.8 KG/M2 | TEMPERATURE: 97.8 F | DIASTOLIC BLOOD PRESSURE: 75 MMHG | RESPIRATION RATE: 20 BRPM

## 2019-10-24 DIAGNOSIS — M43.16 SPONDYLOLISTHESIS OF LUMBAR REGION: Primary | ICD-10-CM

## 2019-10-24 DIAGNOSIS — Z78.0 POSTMENOPAUSAL: ICD-10-CM

## 2019-10-24 DIAGNOSIS — M54.40 BACK PAIN OF LUMBAR REGION WITH SCIATICA: ICD-10-CM

## 2019-10-24 PROCEDURE — G8484 FLU IMMUNIZE NO ADMIN: HCPCS | Performed by: NURSE PRACTITIONER

## 2019-10-24 PROCEDURE — G8419 CALC BMI OUT NRM PARAM NOF/U: HCPCS | Performed by: NURSE PRACTITIONER

## 2019-10-24 PROCEDURE — 4004F PT TOBACCO SCREEN RCVD TLK: CPT | Performed by: NURSE PRACTITIONER

## 2019-10-24 PROCEDURE — 3017F COLORECTAL CA SCREEN DOC REV: CPT | Performed by: NURSE PRACTITIONER

## 2019-10-24 PROCEDURE — 99214 OFFICE O/P EST MOD 30 MIN: CPT | Performed by: NURSE PRACTITIONER

## 2019-10-24 PROCEDURE — G8427 DOCREV CUR MEDS BY ELIG CLIN: HCPCS | Performed by: NURSE PRACTITIONER

## 2019-10-24 ASSESSMENT — PATIENT HEALTH QUESTIONNAIRE - PHQ9
2. FEELING DOWN, DEPRESSED OR HOPELESS: 0
SUM OF ALL RESPONSES TO PHQ QUESTIONS 1-9: 0
1. LITTLE INTEREST OR PLEASURE IN DOING THINGS: 0
SUM OF ALL RESPONSES TO PHQ QUESTIONS 1-9: 0
SUM OF ALL RESPONSES TO PHQ9 QUESTIONS 1 & 2: 0

## 2019-10-24 ASSESSMENT — ENCOUNTER SYMPTOMS
DIARRHEA: 0
VOMITING: 0
NAUSEA: 0
WHEEZING: 0
BACK PAIN: 1
SHORTNESS OF BREATH: 0
COUGH: 0

## 2019-10-30 ASSESSMENT — ENCOUNTER SYMPTOMS
ABDOMINAL PAIN: 0
BOWEL INCONTINENCE: 0

## 2019-11-04 ENCOUNTER — OFFICE VISIT (OUTPATIENT)
Dept: ONCOLOGY | Age: 54
End: 2019-11-04
Payer: COMMERCIAL

## 2019-11-04 VITALS
WEIGHT: 173 LBS | BODY MASS INDEX: 27.1 KG/M2 | HEART RATE: 69 BPM | DIASTOLIC BLOOD PRESSURE: 72 MMHG | RESPIRATION RATE: 18 BRPM | TEMPERATURE: 97.7 F | SYSTOLIC BLOOD PRESSURE: 117 MMHG

## 2019-11-04 DIAGNOSIS — D69.6 THROMBOCYTOPENIA (HCC): Primary | ICD-10-CM

## 2019-11-04 PROCEDURE — 4004F PT TOBACCO SCREEN RCVD TLK: CPT | Performed by: INTERNAL MEDICINE

## 2019-11-04 PROCEDURE — G8484 FLU IMMUNIZE NO ADMIN: HCPCS | Performed by: INTERNAL MEDICINE

## 2019-11-04 PROCEDURE — G8427 DOCREV CUR MEDS BY ELIG CLIN: HCPCS | Performed by: INTERNAL MEDICINE

## 2019-11-04 PROCEDURE — 99214 OFFICE O/P EST MOD 30 MIN: CPT | Performed by: INTERNAL MEDICINE

## 2019-11-04 PROCEDURE — G8419 CALC BMI OUT NRM PARAM NOF/U: HCPCS | Performed by: INTERNAL MEDICINE

## 2019-11-04 PROCEDURE — 3017F COLORECTAL CA SCREEN DOC REV: CPT | Performed by: INTERNAL MEDICINE

## 2019-11-04 ASSESSMENT — ENCOUNTER SYMPTOMS
NAUSEA: 0
COLOR CHANGE: 0
BACK PAIN: 0
EYE ITCHING: 0
ABDOMINAL PAIN: 0
SHORTNESS OF BREATH: 0
BLOOD IN STOOL: 0
EYE REDNESS: 0
WHEEZING: 0
COUGH: 0
DIARRHEA: 0
CONSTIPATION: 0
CHEST TIGHTNESS: 0
VOMITING: 0

## 2019-11-14 DIAGNOSIS — F34.1 DYSTHYMIA: ICD-10-CM

## 2019-11-14 RX ORDER — SERTRALINE HYDROCHLORIDE 25 MG/1
TABLET, FILM COATED ORAL
Qty: 90 TABLET | Refills: 0 | Status: SHIPPED | OUTPATIENT
Start: 2019-11-14 | End: 2020-02-04

## 2019-11-20 ENCOUNTER — TELEPHONE (OUTPATIENT)
Dept: PRIMARY CARE CLINIC | Age: 54
End: 2019-11-20

## 2020-01-10 RX ORDER — LISINOPRIL 10 MG/1
10 TABLET ORAL DAILY
Qty: 90 TABLET | Refills: 0 | Status: SHIPPED | OUTPATIENT
Start: 2020-01-10 | End: 2020-04-13 | Stop reason: SDUPTHER

## 2020-02-04 ENCOUNTER — OFFICE VISIT (OUTPATIENT)
Dept: PRIMARY CARE CLINIC | Age: 55
End: 2020-02-04
Payer: COMMERCIAL

## 2020-02-04 VITALS
SYSTOLIC BLOOD PRESSURE: 135 MMHG | TEMPERATURE: 97.8 F | HEART RATE: 78 BPM | OXYGEN SATURATION: 99 % | DIASTOLIC BLOOD PRESSURE: 82 MMHG | BODY MASS INDEX: 27.61 KG/M2 | RESPIRATION RATE: 20 BRPM | WEIGHT: 176.3 LBS

## 2020-02-04 PROCEDURE — G8419 CALC BMI OUT NRM PARAM NOF/U: HCPCS | Performed by: NURSE PRACTITIONER

## 2020-02-04 PROCEDURE — 4004F PT TOBACCO SCREEN RCVD TLK: CPT | Performed by: NURSE PRACTITIONER

## 2020-02-04 PROCEDURE — G8484 FLU IMMUNIZE NO ADMIN: HCPCS | Performed by: NURSE PRACTITIONER

## 2020-02-04 PROCEDURE — 3017F COLORECTAL CA SCREEN DOC REV: CPT | Performed by: NURSE PRACTITIONER

## 2020-02-04 PROCEDURE — 99214 OFFICE O/P EST MOD 30 MIN: CPT | Performed by: NURSE PRACTITIONER

## 2020-02-04 PROCEDURE — G8427 DOCREV CUR MEDS BY ELIG CLIN: HCPCS | Performed by: NURSE PRACTITIONER

## 2020-02-04 ASSESSMENT — ENCOUNTER SYMPTOMS
PHOTOPHOBIA: 0
DIARRHEA: 0
EYE REDNESS: 0
WHEEZING: 0
RHINORRHEA: 0
VOMITING: 0
COUGH: 0
CONSTIPATION: 0
BOWEL INCONTINENCE: 0
BACK PAIN: 1
SINUS PRESSURE: 0
ABDOMINAL PAIN: 0
SORE THROAT: 0
ORTHOPNEA: 0
NAUSEA: 0
SHORTNESS OF BREATH: 0

## 2020-02-04 NOTE — PROGRESS NOTES
Name: Seema Grigsby  : 1965         Chief Complaint:     Chief Complaint   Patient presents with    Hypertension     routine check    Mental Health Problem     wants medication increased       History of Present Illness:      Seema Grigsby is a 49 y.o.  female who presents with Hypertension (routine check) and Mental Health Problem (wants medication increased)      Jorge L Vaughan is here is today for a routine office visit. Anxiety/depression- She feels like she has been more depressed over the last couple weeks. Her and her  are going to separate. She also had a son who passed away years ago and that has been weighing on her mind. She reports that she has not been getting much sleep. She works night and wants to work days however she cannot find a job. She has noticed a decrease and energy and is hoping to have her dose of Zoloft increased. She has been trying to play games on her phone or she will go in her bedroom where it is quiet to help calm. She has a cousin that she talks to everyday that helps with her stress. She denies any thoughts of hurting herself or anyone else. Tobacco abuse- She has cut back on her cigarette smoking to .5 pack a day which has improved from 1 ppd. Hypertension   This is a chronic problem. The current episode started more than 1 year ago. The problem is controlled. Associated symptoms include anxiety and headaches (two over the last couple weeks. ). Pertinent negatives include no chest pain, malaise/fatigue, orthopnea, palpitations, peripheral edema or shortness of breath. There are no associated agents to hypertension. Risk factors for coronary artery disease include smoking/tobacco exposure, stress and sedentary lifestyle. Past treatments include ACE inhibitors. Compliance problems include exercise (smoking). There is no history of angina, kidney disease or CAD/MI. Back Pain   This is a recurrent problem. The current episode started more than 1 month ago.  The No oropharyngeal exudate or posterior oropharyngeal erythema. Tonsils: No tonsillar abscesses. Eyes:      General: No scleral icterus. Right eye: No discharge. Left eye: No discharge. Pupils: Pupils are equal, round, and reactive to light. Neck:      Musculoskeletal: Normal range of motion and neck supple. Trachea: No tracheal deviation. Cardiovascular:      Rate and Rhythm: Normal rate and regular rhythm. Pulses:           Radial pulses are 2+ on the right side and 2+ on the left side. Heart sounds: S1 normal and S2 normal. No murmur. No S3 or S4 sounds. Pulmonary:      Effort: Pulmonary effort is normal. No respiratory distress. Breath sounds: Normal breath sounds. No wheezing, rhonchi or rales. Abdominal:      General: Bowel sounds are normal. There is no distension. Palpations: Abdomen is soft. Tenderness: There is no abdominal tenderness. There is no guarding. Negative signs include Gonzalez's sign. Musculoskeletal: Normal range of motion. Back:    Lymphadenopathy:      Cervical: No cervical adenopathy. Skin:     General: Skin is warm and dry. Findings: No erythema or rash. Neurological:      Mental Status: She is alert and oriented to person, place, and time.          Data:     Lab Results   Component Value Date     08/02/2019    K 4.4 08/02/2019     08/02/2019    CO2 29 08/02/2019    BUN 17 08/02/2019    CREATININE 0.89 08/02/2019    GLUCOSE 77 08/02/2019    PROT 7.3 08/02/2019    LABALBU 4.6 08/02/2019    BILITOT 0.61 08/02/2019    ALKPHOS 56 08/02/2019    AST 18 08/02/2019    ALT 18 08/02/2019     Lab Results   Component Value Date    WBC 4.0 09/30/2019    RBC 4.56 09/30/2019    HGB 13.6 09/30/2019    HCT 41.2 09/30/2019    MCV 90.4 09/30/2019    MCH 29.8 09/30/2019    MCHC 33.0 09/30/2019    RDW 12.1 09/30/2019    PLT See Reflexed IPF Result 09/30/2019    MPV NOT REPORTED 09/30/2019     Lab Results   Component tablet 1     Sig: Take 1 tablet by mouth daily         Return in about 3 months (around 5/4/2020) for Check up.

## 2020-02-04 NOTE — PATIENT INSTRUCTIONS
SURVEY:    You may be receiving a survey from [employer] regarding your visit today. Please complete the survey to enable us to provide the highest quality of care to you and your family. If you cannot score us a very good on any question, please call the office to discuss how we could have made your experience a very good one. Thank you. Patient Education        High Blood Pressure: Care Instructions  Overview    It's normal for blood pressure to go up and down throughout the day. But if it stays up, you have high blood pressure. Another name for high blood pressure is hypertension. Despite what a lot of people think, high blood pressure usually doesn't cause headaches or make you feel dizzy or lightheaded. It usually has no symptoms. But it does increase your risk of stroke, heart attack, and other problems. You and your doctor will talk about your risks of these problems based on your blood pressure. Your doctor will give you a goal for your blood pressure. Your goal will be based on your health and your age. Lifestyle changes, such as eating healthy and being active, are always important to help lower blood pressure. You might also take medicine to reach your blood pressure goal.  Follow-up care is a key part of your treatment and safety. Be sure to make and go to all appointments, and call your doctor if you are having problems. It's also a good idea to know your test results and keep a list of the medicines you take. How can you care for yourself at home? Medical treatment  · If you stop taking your medicine, your blood pressure will go back up. You may take one or more types of medicine to lower your blood pressure. Be safe with medicines. Take your medicine exactly as prescribed. Call your doctor if you think you are having a problem with your medicine. · Talk to your doctor before you start taking aspirin every day.  Aspirin can help certain people lower their risk of a heart attack or stroke. But taking aspirin isn't right for everyone, because it can cause serious bleeding. · See your doctor regularly. You may need to see the doctor more often at first or until your blood pressure comes down. · If you are taking blood pressure medicine, talk to your doctor before you take decongestants or anti-inflammatory medicine, such as ibuprofen. Some of these medicines can raise blood pressure. · Learn how to check your blood pressure at home. Lifestyle changes  · Stay at a healthy weight. This is especially important if you put on weight around the waist. Losing even 10 pounds can help you lower your blood pressure. · If your doctor recommends it, get more exercise. Walking is a good choice. Bit by bit, increase the amount you walk every day. Try for at least 30 minutes on most days of the week. You also may want to swim, bike, or do other activities. · Avoid or limit alcohol. Talk to your doctor about whether you can drink any alcohol. · Try to limit how much sodium you eat to less than 2,300 milligrams (mg) a day. Your doctor may ask you to try to eat less than 1,500 mg a day. · Eat plenty of fruits (such as bananas and oranges), vegetables, legumes, whole grains, and low-fat dairy products. · Lower the amount of saturated fat in your diet. Saturated fat is found in animal products such as milk, cheese, and meat. Limiting these foods may help you lose weight and also lower your risk for heart disease. · Do not smoke. Smoking increases your risk for heart attack and stroke. If you need help quitting, talk to your doctor about stop-smoking programs and medicines. These can increase your chances of quitting for good. When should you call for help? Call  911 anytime you think you may need emergency care. This may mean having symptoms that suggest that your blood pressure is causing a serious heart or blood vessel problem.  Your blood pressure may be over 180/120.   For example, call  911 if:    weeks. Antidepressants can make you feel tired, dizzy, or nervous. Some people have dry mouth, constipation, headaches, sexual problems, an upset stomach, or diarrhea. Many of these side effects are mild and go away on their own after you take the medicine for a few weeks. Some may last longer. Talk to your doctor if side effects bother you too much. You might be able to try a different medicine. If you are pregnant or breastfeeding, talk to your doctor about what medicines you can take. Learn about counseling  In many cases, counseling can work as well as medicines to treat mild to moderate depression. Counseling is done by licensed mental health providers, such as psychologists, social workers, and some types of nurses. It can be done in one-on-one sessions or in a group setting. Many people find group sessions helpful. Cognitive-behavioral therapy is a type of counseling. In this treatment therapy, you learn how to see and change unhelpful thinking styles that may be adding to your depression. Counseling and medicines often work well when used together. To manage depression  · Be physically active. Getting 30 minutes of exercise each day is good for your body and your mind. Begin slowly if it is hard for you to get started. If you already exercise, keep it up. · Plan something pleasant for yourself every day. Include activities that you have enjoyed in the past.  · Get enough sleep. Talk to your doctor if you have problems sleeping. · Eat a balanced diet. If you do not feel hungry, eat small snacks rather than large meals. · Do not drink alcohol, use illegal drugs, or take medicines that your doctor has not prescribed for you. They may interfere with your treatment. · Spend time with family and friends. It may help to speak openly about your depression with people you trust.  · Take your medicines exactly as prescribed. Call your doctor if you think you are having a problem with your medicine.   · Do not make major life decisions while you are depressed. Depression may change the way you think. You will be able to make better decisions after you feel better. · Think positively. Challenge negative thoughts with statements such as \"I am hopeful\"; \"Things will get better\"; and \"I can ask for the help I need. \" Write down these statements and read them often, even if you don't believe them yet. · Be patient with yourself. It took time for your depression to develop, and it will take time for your symptoms to improve. Do not take on too much or be too hard on yourself. · Learn all you can about depression from written and online materials. · Check out behavioral health classes to learn more about dealing with depression. · Keep the numbers for these national suicide hotlines: 9-799-679-TALK (0-825.967.7719) and 4-566-AJSAAUQ (6-758.818.8395). If you or someone you know talks about suicide or feeling hopeless, get help right away. When should you call for help? Call 911 anytime you think you may need emergency care. For example, call if:    · You feel you cannot stop from hurting yourself or someone else.   [facility] your doctor now or seek immediate medical care if:    · You hear voices.     · You feel much more depressed.    Watch closely for changes in your health, and be sure to contact your doctor if:    · You are having problems with your depression medicine.     · You are not getting better as expected. Where can you learn more? Go to https://Linkoveryaj.Vestmark. org and sign in to your [employer] account. Enter K726 in the Gigamon box to learn more about \"Depression Treatment: Care Instructions. \"     If you do not have an account, please click on the \"Sign Up Now\" link. Current as of: May 28, 2019  Content Version: 12.3  © 6249-7995 Healthwise, Incorporated. Care instructions adapted under license by [facility] ([facility]).  If you have questions about a medical condition or this instruction,

## 2020-02-06 PROBLEM — F34.1 DYSTHYMIA: Status: RESOLVED | Noted: 2019-07-30 | Resolved: 2020-02-06

## 2020-03-09 ENCOUNTER — TELEPHONE (OUTPATIENT)
Dept: PRIMARY CARE CLINIC | Age: 55
End: 2020-03-09

## 2020-03-24 ENCOUNTER — TELEPHONE (OUTPATIENT)
Dept: PRIMARY CARE CLINIC | Age: 55
End: 2020-03-24

## 2020-03-31 ENCOUNTER — TELEPHONE (OUTPATIENT)
Dept: PRIMARY CARE CLINIC | Age: 55
End: 2020-03-31

## 2020-04-12 ENCOUNTER — PATIENT MESSAGE (OUTPATIENT)
Dept: PRIMARY CARE CLINIC | Age: 55
End: 2020-04-12

## 2020-04-13 RX ORDER — LISINOPRIL 10 MG/1
10 TABLET ORAL DAILY
Qty: 90 TABLET | Refills: 1 | Status: SHIPPED | OUTPATIENT
Start: 2020-04-13 | End: 2020-10-26 | Stop reason: SDUPTHER

## 2020-04-13 NOTE — TELEPHONE ENCOUNTER
From: Sharyle Pagan  To:  Charli Mathews, CASTILLO - CNP  Sent: 4/12/2020 4:27 AM EDT  Subject: Prescription Question    I'm out of my lisinopril I think I need another prescription

## 2020-04-15 ENCOUNTER — TELEPHONE (OUTPATIENT)
Dept: PRIMARY CARE CLINIC | Age: 55
End: 2020-04-15

## 2020-04-15 NOTE — TELEPHONE ENCOUNTER
Called patient and reminded her to get her fasting labs done that Charli had ordered. Verbalizes understanding.

## 2020-04-29 ENCOUNTER — TELEPHONE (OUTPATIENT)
Dept: PRIMARY CARE CLINIC | Age: 55
End: 2020-04-29

## 2020-04-29 NOTE — TELEPHONE ENCOUNTER
Called patient and reminded her to have her xray of her lumbar spine done that Charli had ordered. Verbalizes understanding.

## 2020-05-13 ENCOUNTER — OFFICE VISIT (OUTPATIENT)
Dept: PRIMARY CARE CLINIC | Age: 55
End: 2020-05-13
Payer: COMMERCIAL

## 2020-05-13 VITALS
HEIGHT: 68 IN | RESPIRATION RATE: 16 BRPM | TEMPERATURE: 98.1 F | SYSTOLIC BLOOD PRESSURE: 121 MMHG | BODY MASS INDEX: 26.13 KG/M2 | HEART RATE: 63 BPM | WEIGHT: 172.4 LBS | DIASTOLIC BLOOD PRESSURE: 69 MMHG

## 2020-05-13 PROCEDURE — G8427 DOCREV CUR MEDS BY ELIG CLIN: HCPCS | Performed by: NURSE PRACTITIONER

## 2020-05-13 PROCEDURE — G8419 CALC BMI OUT NRM PARAM NOF/U: HCPCS | Performed by: NURSE PRACTITIONER

## 2020-05-13 PROCEDURE — 3017F COLORECTAL CA SCREEN DOC REV: CPT | Performed by: NURSE PRACTITIONER

## 2020-05-13 PROCEDURE — 4004F PT TOBACCO SCREEN RCVD TLK: CPT | Performed by: NURSE PRACTITIONER

## 2020-05-13 PROCEDURE — 99214 OFFICE O/P EST MOD 30 MIN: CPT | Performed by: NURSE PRACTITIONER

## 2020-05-13 ASSESSMENT — ENCOUNTER SYMPTOMS
CONSTIPATION: 0
ABDOMINAL PAIN: 0
SORE THROAT: 0
NAUSEA: 0
ORTHOPNEA: 0
RHINORRHEA: 0
BLURRED VISION: 0
COUGH: 0
DIARRHEA: 0
WHEEZING: 0
SHORTNESS OF BREATH: 0
VOMITING: 0

## 2020-05-13 NOTE — PROGRESS NOTES
Name: Sabine Vora  : 1965         Chief Complaint:     Chief Complaint   Patient presents with    Hypertension     Routine visit.  Mental Health Problem       History of Present Illness:      Sabine Vora is a 49 y.o.  female who presents with Hypertension (Routine visit. ) and [address] [place] [address] Avenue is here for a routine office visit. Thrombocytopenia- patient did not have her appointment with her hematologist in February. She is encouraged to reschedule and complete her lab work. Dysthymia-stable, patient states she is a little bit tired when she takes the sertraline for going to work. 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 She states her anxiety level is better and her mood is improved. Hypertension   This is a chronic problem. The current episode started more than 1 year ago. The problem is unchanged. The problem is controlled. Associated symptoms include anxiety (Improved). Pertinent negatives include no blurred vision, chest pain, headaches, malaise/fatigue, neck pain, orthopnea, palpitations, peripheral edema, PND, shortness of breath or sweats. There are no associated agents to hypertension. Risk factors for coronary artery disease include stress. Past treatments include ACE inhibitors. The current treatment provides moderate improvement. There are no compliance problems. There is no history of kidney disease, CAD/MI, CVA or heart failure. There is no history of chronic renal disease. Past Medical History:     Past Medical History:   Diagnosis Date    GERD (gastroesophageal reflux disease)     Headache     Insomnia       Reviewed all health maintenance requirements and ordered appropriate tests  Health Maintenance Due   Topic Date Due    Colon Cancer Screen FIT/FOBT  2020       Past Surgical History:     History reviewed. No pertinent surgical history.      Medications:       Prior to Admission medications    Medication Sig Start Date End General: She is not in acute distress. Appearance: Normal appearance. She is not ill-appearing. HENT:      Mouth/Throat:      Mouth: Mucous membranes are moist.   Eyes:      General: No scleral icterus. Conjunctiva/sclera: Conjunctivae normal.   Neck:      Musculoskeletal: Normal range of motion and neck supple. Cardiovascular:      Rate and Rhythm: Normal rate and regular rhythm. Heart sounds: No murmur. Pulmonary:      Effort: Pulmonary effort is normal.      Breath sounds: Normal breath sounds. No wheezing. Abdominal:      General: Bowel sounds are normal. There is no distension. Palpations: Abdomen is soft. Tenderness: There is no abdominal tenderness. Musculoskeletal:      Right lower leg: No edema. Left lower leg: No edema. Skin:     General: Skin is warm and dry. Neurological:      Mental Status: She is alert and oriented to person, place, and time. Psychiatric:         Mood and Affect: Mood normal.         Behavior: Behavior normal.         Thought Content: Thought content normal.         Judgment: Judgment normal.         Data:     Lab Results   Component Value Date     08/02/2019    K 4.4 08/02/2019     08/02/2019    CO2 29 08/02/2019    BUN 17 08/02/2019    CREATININE 0.89 08/02/2019    GLUCOSE 77 08/02/2019    PROT 7.3 08/02/2019    LABALBU 4.6 08/02/2019    BILITOT 0.61 08/02/2019    ALKPHOS 56 08/02/2019    AST 18 08/02/2019    ALT 18 08/02/2019     Lab Results   Component Value Date    WBC 4.0 09/30/2019    RBC 4.56 09/30/2019    HGB 13.6 09/30/2019    HCT 41.2 09/30/2019    MCV 90.4 09/30/2019    MCH 29.8 09/30/2019    MCHC 33.0 09/30/2019    RDW 12.1 09/30/2019    PLT See Reflexed IPF Result 09/30/2019    MPV NOT REPORTED 09/30/2019     Lab Results   Component Value Date    TSH 5.10 04/03/2019     Lab Results   Component Value Date    CHOL 154 04/03/2019    HDL 58 04/03/2019       Assessment/Plan:      Diagnosis Orders   1.  Essential

## 2020-05-13 NOTE — PATIENT INSTRUCTIONS
· You have symptoms of a heart attack. These may include:  ? Chest pain or pressure, or a strange feeling in the chest.  ? Sweating. ? Shortness of breath. ? Nausea or vomiting. ? Pain, pressure, or a strange feeling in the back, neck, jaw, or upper belly or in one or both shoulders or arms. ? Lightheadedness or sudden weakness. ? A fast or irregular heartbeat.     · You have symptoms of a stroke. These may include:  ? Sudden numbness, tingling, weakness, or loss of movement in your face, arm, or leg, especially on only one side of your body. ? Sudden vision changes. ? Sudden trouble speaking. ? Sudden confusion or trouble understanding simple statements. ? Sudden problems with walking or balance. ? A sudden, severe headache that is different from past headaches.     · You have severe back or belly pain.    Do not wait until your blood pressure comes down on its own. Get help right away.   Call your doctor now or seek immediate care if:    · Your blood pressure is much higher than normal (such as 180/120 or higher), but you don't have symptoms.     · You think high blood pressure is causing symptoms, such as:  ? Severe headache.  ? Blurry vision.    Watch closely for changes in your health, and be sure to contact your doctor if:    · Your blood pressure measures higher than your doctor recommends at least 2 times. That means the top number is higher or the bottom number is higher, or both.     · You think you may be having side effects from your blood pressure medicine. Where can you learn more? Go to https://SafeOp Surgical.CitySourced. org and sign in to your [employer] account. Enter T607 in the Reds10 box to learn more about \"High Blood Pressure: Care Instructions. \"     If you do not have an account, please click on the \"Sign Up Now\" link. Current as of: December 15, 2019Content Version: 12.4  © 9142-7101 Healthwise, Incorporated.   Care instructions adapted under license by [facility]. If you have questions about a medical condition or this instruction, always ask your healthcare professional. [place] [address] any warranty or liability for your use of this information.

## 2020-05-21 ENCOUNTER — TELEPHONE (OUTPATIENT)
Dept: PRIMARY CARE CLINIC | Age: 55
End: 2020-05-21

## 2020-05-28 ENCOUNTER — TELEPHONE (OUTPATIENT)
Dept: PRIMARY CARE CLINIC | Age: 55
End: 2020-05-28

## 2020-06-12 ENCOUNTER — TELEPHONE (OUTPATIENT)
Dept: PRIMARY CARE CLINIC | Age: 55
End: 2020-06-12

## 2020-06-19 ENCOUNTER — TELEPHONE (OUTPATIENT)
Dept: PRIMARY CARE CLINIC | Age: 55
End: 2020-06-19

## 2020-08-20 ENCOUNTER — PATIENT MESSAGE (OUTPATIENT)
Dept: PRIMARY CARE CLINIC | Age: 55
End: 2020-08-20

## 2020-08-20 NOTE — TELEPHONE ENCOUNTER
From: Nikolay Lynch  To:  CASTILLO Bello CNP  Sent: 8/20/2020 5:09 PM EDT  Subject: Prescription Question    I am out of sertraline need it refilled

## 2020-08-20 NOTE — TELEPHONE ENCOUNTER
Health Maintenance   Topic Date Due    Colon Cancer Screen FIT/FOBT  04/03/2020    Potassium monitoring  08/02/2020    Creatinine monitoring  08/02/2020    DTaP/Tdap/Td vaccine (1 - Tdap) 05/13/2021 (Originally 5/19/1985)    Cervical cancer screen  05/13/2021 (Originally 5/19/1987)    Shingles Vaccine (1 of 2) 05/13/2021 (Originally 5/19/2016)    Pneumococcal 0-64 years Vaccine (1 of 1 - PPSV23) 05/13/2021 (Originally 5/19/1972)    HIV screen  05/13/2021 (Originally 5/19/1981)    Flu vaccine (1) 09/01/2020    Breast cancer screen  09/12/2021    Lipid screen  04/03/2024    Hepatitis A vaccine  Aged Out    Hepatitis B vaccine  Aged Out    Hib vaccine  Aged Out    Meningococcal (ACWY) vaccine  Aged Out             (applicable per patient's age: Cancer Screenings, Depression Screening, Fall Risk Screening, Immunizations)    LDL Cholesterol (mg/dL)   Date Value   04/03/2019 84     AST (U/L)   Date Value   08/02/2019 18     ALT (U/L)   Date Value   08/02/2019 18     BUN (mg/dL)   Date Value   08/02/2019 17      (goal A1C is < 7)   (goal LDL is <100) need 30-50% reduction from baseline     BP Readings from Last 3 Encounters:   05/13/20 121/69   02/04/20 135/82   11/04/19 117/72    (goal /80)      All Future Testing planned in [facility]:  Lab Frequency Next Occurrence   CBC Auto Differential Once 01/28/2020   Comprehensive Metabolic Panel Once 50/38/5714   XR LUMBAR SPINE (2-3 VIEWS) Once 11/17/2020   CBC Auto Differential Once 11/09/2020   ALT Once 11/13/2020   AST Once 26/87/0846   Basic Metabolic Panel Once 74/40/6489   Lipid Panel Once 11/09/2020       Next Visit Date:  Future Appointments   Date Time Provider Jeramy Barney   11/17/2020  2:20 PM Bar April Might, APRN - CNP [facility]            Patient Active Problem List:     Thrombocytopenia ([address]     Essential hypertension  2

## 2020-10-26 RX ORDER — LISINOPRIL 10 MG/1
10 TABLET ORAL DAILY
Qty: 90 TABLET | Refills: 1 | Status: SHIPPED | OUTPATIENT
Start: 2020-10-26 | End: 2021-05-06 | Stop reason: SDUPTHER

## 2020-10-26 NOTE — TELEPHONE ENCOUNTER
Pending meds    Health Maintenance   Topic Date Due    Colon Cancer Screen FIT/FOBT  04/03/2020    Potassium monitoring  08/02/2020    Creatinine monitoring  08/02/2020    Flu vaccine (1) 09/01/2020    DTaP/Tdap/Td vaccine (1 - Tdap) 05/13/2021 (Originally 5/19/1985)    Cervical cancer screen  05/13/2021 (Originally 5/19/1987)    Shingles Vaccine (1 of 2) 05/13/2021 (Originally 5/19/2016)    Pneumococcal 0-64 years Vaccine (1 of 1 - PPSV23) 05/13/2021 (Originally 5/19/1972)    HIV screen  05/13/2021 (Originally 5/19/1981)    Breast cancer screen  09/12/2021    Lipid screen  04/03/2024    Hepatitis A vaccine  Aged Out    Hepatitis B vaccine  Aged Out    Hib vaccine  Aged Out    Meningococcal (ACWY) vaccine  Aged Out             (applicable per patient's age: Cancer Screenings, Depression Screening, Fall Risk Screening, Immunizations)    LDL Cholesterol (mg/dL)   Date Value   04/03/2019 84     AST (U/L)   Date Value   08/02/2019 18     ALT (U/L)   Date Value   08/02/2019 18     BUN (mg/dL)   Date Value   08/02/2019 17      (goal A1C is < 7)   (goal LDL is <100) need 30-50% reduction from baseline     BP Readings from Last 3 Encounters:   05/13/20 121/69   02/04/20 135/82   11/04/19 117/72    (goal /80)      All Future Testing planned in [facility]:  Lab Frequency Next Occurrence   CBC Auto Differential Once 01/28/2020   Comprehensive Metabolic Panel Once 02/94/6427   XR LUMBAR SPINE (2-3 VIEWS) Once 11/17/2020   CBC Auto Differential Once 11/09/2020   ALT Once 11/13/2020   AST Once 88/96/8443   Basic Metabolic Panel Once 59/70/9343   Lipid Panel Once 11/09/2020       Next Visit Date:  Future Appointments   Date Time Provider Jeramy Barney   11/17/2020  2:20 PM Mandeep Silverman, APRN - CNP [facility]            Patient Active Problem List:     Thrombocytopenia ([address]     Essential hypertension

## 2020-11-17 ENCOUNTER — TELEPHONE (OUTPATIENT)
Dept: PRIMARY CARE CLINIC | Age: 55
End: 2020-11-17

## 2020-11-17 NOTE — TELEPHONE ENCOUNTER
I spoke with pt., reminded her complete this week, pt. Voices understanding. Orders extended 1 week.

## 2020-11-23 ENCOUNTER — TELEPHONE (OUTPATIENT)
Dept: PRIMARY CARE CLINIC | Age: 55
End: 2020-11-23

## 2020-11-25 ENCOUNTER — HOSPITAL ENCOUNTER (OUTPATIENT)
Age: 55
Discharge: HOME | End: 2020-11-25
Payer: COMMERCIAL

## 2020-11-25 ENCOUNTER — TELEPHONE (OUTPATIENT)
Dept: PRIMARY CARE CLINIC | Age: 55
End: 2020-11-25

## 2020-11-25 LAB
ABSOLUTE EOS #: 0.15 K/UL (ref 0–0.44)
ABSOLUTE IMMATURE GRANULOCYTE: 0.05 K/UL (ref 0–0.3)
ABSOLUTE LYMPH #: 1.33 K/UL (ref 1.1–3.7)
ABSOLUTE MONO #: 0.31 K/UL (ref 0.1–1.2)
ALT SERPL-CCNC: 17 U/L (ref 5–33)
ANION GAP SERPL CALCULATED.3IONS-SCNC: 10 MMOL/L (ref 9–17)
AST SERPL-CCNC: 17 U/L
BASOPHILS # BLD: 0 % (ref 0–2)
BASOPHILS ABSOLUTE: 0 K/UL (ref 0–0.2)
BUN BLDV-MCNC: 21 MG/DL (ref 6–20)
BUN/CREAT BLD: 26 (ref 9–20)
CALCIUM SERPL-MCNC: 9.7 MG/DL (ref 8.6–10.4)
CHLORIDE BLD-SCNC: 102 MMOL/L (ref 98–107)
CHOLESTEROL/HDL RATIO: 2.7
CHOLESTEROL: 172 MG/DL
CO2: 26 MMOL/L (ref 20–31)
CREAT SERPL-MCNC: 0.8 MG/DL (ref 0.5–0.9)
DIFFERENTIAL TYPE: ABNORMAL
EOSINOPHILS RELATIVE PERCENT: 3 % (ref 1–4)
GFR AFRICAN AMERICAN: >60 ML/MIN
GFR NON-AFRICAN AMERICAN: >60 ML/MIN
GFR SERPL CREATININE-BSD FRML MDRD: ABNORMAL ML/MIN/{1.73_M2}
GFR SERPL CREATININE-BSD FRML MDRD: ABNORMAL ML/MIN/{1.73_M2}
GLUCOSE BLD-MCNC: 84 MG/DL (ref 70–99)
HCT VFR BLD CALC: 43.6 % (ref 36.3–47.1)
HDLC SERPL-MCNC: 63 MG/DL
HEMOGLOBIN: 13.9 G/DL (ref 11.9–15.1)
IMMATURE GRANULOCYTES: 1 %
LDL CHOLESTEROL: 94 MG/DL (ref 0–130)
LYMPHOCYTES # BLD: 26 % (ref 24–43)
MCH RBC QN AUTO: 29.4 PG (ref 25.2–33.5)
MCHC RBC AUTO-ENTMCNC: 31.9 G/DL (ref 28.4–34.8)
MCV RBC AUTO: 92.2 FL (ref 82.6–102.9)
MONOCYTES # BLD: 6 % (ref 3–12)
MORPHOLOGY: NORMAL
NRBC AUTOMATED: 0 PER 100 WBC
PDW BLD-RTO: 12.1 % (ref 11.8–14.4)
PLATELET # BLD: ABNORMAL K/UL (ref 138–453)
PLATELET ESTIMATE: ABNORMAL
PLATELET, FLUORESCENCE: 105 K/UL (ref 138–453)
PLATELET, IMMATURE FRACTION: 14.8 % (ref 1.1–10.3)
PMV BLD AUTO: ABNORMAL FL (ref 8.1–13.5)
POTASSIUM SERPL-SCNC: 4.5 MMOL/L (ref 3.7–5.3)
RBC # BLD: 4.73 M/UL (ref 3.95–5.11)
RBC # BLD: ABNORMAL 10*6/UL
SEG NEUTROPHILS: 64 % (ref 36–65)
SEGMENTED NEUTROPHILS ABSOLUTE COUNT: 3.26 K/UL (ref 1.5–8.1)
SODIUM BLD-SCNC: 138 MMOL/L (ref 135–144)
TRIGL SERPL-MCNC: 75 MG/DL
VLDLC SERPL CALC-MCNC: NORMAL MG/DL (ref 1–30)
WBC # BLD: 5.1 K/UL (ref 3.5–11.3)
WBC # BLD: ABNORMAL 10*3/UL

## 2020-11-25 PROCEDURE — 80048 BASIC METABOLIC PNL TOTAL CA: CPT

## 2020-11-25 PROCEDURE — 85055 RETICULATED PLATELET ASSAY: CPT

## 2020-11-25 PROCEDURE — 84450 TRANSFERASE (AST) (SGOT): CPT

## 2020-11-25 PROCEDURE — 84460 ALANINE AMINO (ALT) (SGPT): CPT

## 2020-11-25 PROCEDURE — 85025 COMPLETE CBC W/AUTO DIFF WBC: CPT

## 2020-11-25 PROCEDURE — 36415 COLL VENOUS BLD VENIPUNCTURE: CPT

## 2020-11-25 PROCEDURE — 80061 LIPID PANEL: CPT

## 2020-12-02 ENCOUNTER — TELEPHONE (OUTPATIENT)
Dept: PRIMARY CARE CLINIC | Age: 55
End: 2020-12-02

## 2020-12-02 NOTE — TELEPHONE ENCOUNTER
Attempted to reach pt., no answer, no VM. Pt. Has had previous reminder calls to complete, order cancelled since almost placed a year ago.

## 2021-03-03 DIAGNOSIS — F32.A ANXIETY AND DEPRESSION: ICD-10-CM

## 2021-03-03 DIAGNOSIS — F41.9 ANXIETY AND DEPRESSION: ICD-10-CM

## 2021-03-03 NOTE — TELEPHONE ENCOUNTER
Phone call from patient requesting a refill of Sertraline. Health Maintenance   Topic Date Due    Hepatitis C screen  Never done    Colon Cancer Screen FIT/FOBT  04/03/2020    Flu vaccine (1) Never done    DTaP/Tdap/Td vaccine (1 - Tdap) 05/13/2021 (Originally 5/19/1985)    Cervical cancer screen  05/13/2021 (Originally 5/19/1987)    Shingles Vaccine (1 of 2) 05/13/2021 (Originally 5/19/2016)    Pneumococcal 0-64 years Vaccine (1 of 1 - PPSV23) 05/13/2021 (Originally 5/19/1972)    HIV screen  05/13/2021 (Originally 5/19/1981)    Breast cancer screen  09/12/2021    Potassium monitoring  11/25/2021    Creatinine monitoring  11/25/2021    Lipid screen  11/25/2025    Hepatitis A vaccine  Aged Out    Hepatitis B vaccine  Aged Out    Hib vaccine  Aged Out    Meningococcal (ACWY) vaccine  Aged Out             (applicable per patient's age: Cancer Screenings, Depression Screening, Fall Risk Screening, Immunizations)    LDL Cholesterol (mg/dL)   Date Value   11/25/2020 94     AST (U/L)   Date Value   11/25/2020 17     ALT (U/L)   Date Value   11/25/2020 17     BUN (mg/dL)   Date Value   11/25/2020 21 (H)      (goal A1C is < 7)   (goal LDL is <100) need 30-50% reduction from baseline     BP Readings from Last 3 Encounters:   05/13/20 121/69   02/04/20 135/82   11/04/19 117/72    (goal /80)      All Future Testing planned in [facility]:  Lab Frequency Next Occurrence       Next Visit Date:  No future appointments.          Patient Active Problem List:     Thrombocytopenia [facility])     Essential hypertension

## 2021-03-04 ENCOUNTER — TELEPHONE (OUTPATIENT)
Dept: PRIMARY CARE CLINIC | Age: 56
End: 2021-03-04

## 2021-03-04 RX ORDER — ACETAMINOPHEN 160 MG
TABLET,DISINTEGRATING ORAL DAILY
COMMUNITY

## 2021-03-04 RX ORDER — MULTIVITAMIN WITH IRON
50 TABLET ORAL DAILY
COMMUNITY

## 2021-03-04 NOTE — TELEPHONE ENCOUNTER
Kenya Mario called in wanting to know if it was okay if she takes Vitamin B12 & D3, she wants to make sure there is no interactions with her already taking sertraline and lisinopril.

## 2021-05-06 ENCOUNTER — OFFICE VISIT (OUTPATIENT)
Dept: PRIMARY CARE CLINIC | Age: 56
End: 2021-05-06
Payer: COMMERCIAL

## 2021-05-06 VITALS
TEMPERATURE: 98.6 F | DIASTOLIC BLOOD PRESSURE: 72 MMHG | SYSTOLIC BLOOD PRESSURE: 112 MMHG | WEIGHT: 189.2 LBS | HEIGHT: 67 IN | HEART RATE: 65 BPM | BODY MASS INDEX: 29.7 KG/M2 | OXYGEN SATURATION: 99 % | RESPIRATION RATE: 18 BRPM

## 2021-05-06 DIAGNOSIS — D69.6 THROMBOCYTOPENIA (HCC): ICD-10-CM

## 2021-05-06 DIAGNOSIS — Z12.11 COLON CANCER SCREENING: ICD-10-CM

## 2021-05-06 DIAGNOSIS — M54.50 CHRONIC BILATERAL LOW BACK PAIN, UNSPECIFIED WHETHER SCIATICA PRESENT: ICD-10-CM

## 2021-05-06 DIAGNOSIS — F41.9 ANXIETY AND DEPRESSION: ICD-10-CM

## 2021-05-06 DIAGNOSIS — R79.89 ELEVATED TSH: ICD-10-CM

## 2021-05-06 DIAGNOSIS — J06.9 VIRAL URI: ICD-10-CM

## 2021-05-06 DIAGNOSIS — F32.A ANXIETY AND DEPRESSION: ICD-10-CM

## 2021-05-06 DIAGNOSIS — Z72.0 TOBACCO ABUSE: ICD-10-CM

## 2021-05-06 DIAGNOSIS — Z12.31 SCREENING MAMMOGRAM, ENCOUNTER FOR: ICD-10-CM

## 2021-05-06 DIAGNOSIS — G89.29 CHRONIC BILATERAL LOW BACK PAIN, UNSPECIFIED WHETHER SCIATICA PRESENT: ICD-10-CM

## 2021-05-06 DIAGNOSIS — I10 ESSENTIAL HYPERTENSION: Primary | ICD-10-CM

## 2021-05-06 PROCEDURE — G8419 CALC BMI OUT NRM PARAM NOF/U: HCPCS | Performed by: NURSE PRACTITIONER

## 2021-05-06 PROCEDURE — 4004F PT TOBACCO SCREEN RCVD TLK: CPT | Performed by: NURSE PRACTITIONER

## 2021-05-06 PROCEDURE — 99214 OFFICE O/P EST MOD 30 MIN: CPT | Performed by: NURSE PRACTITIONER

## 2021-05-06 PROCEDURE — G8427 DOCREV CUR MEDS BY ELIG CLIN: HCPCS | Performed by: NURSE PRACTITIONER

## 2021-05-06 PROCEDURE — 3017F COLORECTAL CA SCREEN DOC REV: CPT | Performed by: NURSE PRACTITIONER

## 2021-05-06 RX ORDER — NAPROXEN 250 MG/1
250 TABLET ORAL 2 TIMES DAILY WITH MEALS
Qty: 60 TABLET | Refills: 0 | Status: SHIPPED | OUTPATIENT
Start: 2021-05-06 | End: 2021-08-24 | Stop reason: ALTCHOICE

## 2021-05-06 RX ORDER — LISINOPRIL 10 MG/1
10 TABLET ORAL DAILY
Qty: 90 TABLET | Refills: 1 | Status: SHIPPED | OUTPATIENT
Start: 2021-05-06 | End: 2022-01-26

## 2021-05-06 ASSESSMENT — ENCOUNTER SYMPTOMS
SORE THROAT: 0
CONSTIPATION: 0
WHEEZING: 0
ORTHOPNEA: 0
SHORTNESS OF BREATH: 0
DIARRHEA: 0
VOMITING: 0
NAUSEA: 0
ABDOMINAL PAIN: 0

## 2021-05-06 ASSESSMENT — PATIENT HEALTH QUESTIONNAIRE - PHQ9
1. LITTLE INTEREST OR PLEASURE IN DOING THINGS: 0
SUM OF ALL RESPONSES TO PHQ QUESTIONS 1-9: 0
2. FEELING DOWN, DEPRESSED OR HOPELESS: 0
SUM OF ALL RESPONSES TO PHQ QUESTIONS 1-9: 0

## 2021-05-06 NOTE — PROGRESS NOTES
Name: Efrain Salmeron  : 1965         Chief Complaint:     Chief Complaint   Patient presents with    Check-Up     x 3 days. c/o sinus pressure, headache, slight cough. History of Present Illness:      Efrain Salmeron is a 48 y.o.  female who presents with Check-Up (x 3 days. c/o sinus pressure, headache, slight cough. )      Monico Aguilera is here is today for a routine office visit. Overall she reports that she is doing well other than sinus congestion for the last 3 days. She reports she continues to cut back on her smoking and has been smoking roughly half pack or less. She reports she has noticed weight gain since cutting back on smoking. Does admit to somewhat of a poor diet and no significant exercise. URI- Reports this has been going on for the last 3 days. No history of allergies. Denies any fever or chills. Denies loss of taste or smell. Has been taking iburprofen which has helped. Has not been sneezing. Has been getting post nasal drainage and headache. Slight cough denies any shortness of breath. Anxiety/depression- She feels like Zoloft is working well. At last visit she changed how she had been taking the Zoloft. She is not taking it at night and reports she does not feel tired after taking the medication. She believes she is on the correct dosage of the Zoloft. She denies any side effects from the medication. Denies any thoughts of hurting herself or anyone else. Low back pain- Reports she has back pain on and off for years. She reports some days pain is so bad she cant get out of bed and some days she has no pain at all. Sometimes the pain will go down into her legs. She is currently not taking any medication for this problem. Denies loss of bowel or bladder function. She does have a known history of degenerative changes of her lumbar spine. She has not interested in a referral for pain management. Thrombocytopenia-platelets have been stable. Last platelet count 035,247. She no longer wants to follow with a hematologist.  She denies any excessive bleeding. Hypertension  This is a chronic problem. The current episode started more than 1 year ago. The problem is controlled. Associated symptoms include anxiety and headaches. Pertinent negatives include no chest pain, malaise/fatigue, neck pain, orthopnea, palpitations, peripheral edema or shortness of breath. There are no associated agents to hypertension. Risk factors for coronary artery disease include smoking/tobacco exposure, stress and sedentary lifestyle. Past treatments include ACE inhibitors. Compliance problems include exercise (smoking). There is no history of angina, kidney disease or CAD/MI. Past Medical History:     Past Medical History:   Diagnosis Date    GERD (gastroesophageal reflux disease)     Headache     Insomnia       Reviewed all health maintenance requirements and ordered appropriate tests  There are no preventive care reminders to display for this patient. Past Surgical History:     No past surgical history on file. Medications:       Prior to Admission medications    Medication Sig Start Date End Date Taking? Authorizing Provider   naproxen (NAPROSYN) 250 MG tablet Take 1 tablet by mouth 2 times daily (with meals) 5/6/21  Yes CASTILLO Douglas CNP   lisinopril (PRINIVIL;ZESTRIL) 10 MG tablet Take 1 tablet by mouth daily 5/6/21  Yes CASTILLO Douglas CNP   Cholecalciferol (VITAMIN D3) 50 MCG (2000 UT) CAPS Take by mouth daily   Yes Historical Provider, MD   vitamin B-12 (CYANOCOBALAMIN) 50 MCG tablet Take 50 mcg by mouth daily   Yes Historical Provider, MD   sertraline (ZOLOFT) 50 MG tablet Take 1 tablet by mouth daily 3/3/21  Yes CASTILLO Douglas CNP        Allergies:       Patient has no known allergies. Social History:     Tobacco:    reports that she has been smoking. She has never used smokeless tobacco.  Alcohol:      reports no history of alcohol use.   Drug Use: reports no history of drug use. Family History:     No family history on file. Review of Systems:     Positive and Negative as described in HPI    Review of Systems   Constitutional: Negative for chills, fatigue, fever and malaise/fatigue. HENT: Positive for congestion, postnasal drip, rhinorrhea and sinus pressure. Negative for ear discharge, ear pain, sore throat and trouble swallowing. Eyes: Negative for visual disturbance. Respiratory: Positive for cough. Negative for shortness of breath and wheezing. Cardiovascular: Negative for chest pain, palpitations and orthopnea. Gastrointestinal: Negative for abdominal pain, constipation, diarrhea, nausea and vomiting. Genitourinary: Negative for difficulty urinating and dysuria. Musculoskeletal: Positive for back pain (On and off). Negative for gait problem, neck pain and neck stiffness. Skin: Negative for rash. Neurological: Positive for headaches. Negative for dizziness, syncope and light-headedness. Psychiatric/Behavioral: Negative for agitation, behavioral problems, confusion, decreased concentration, dysphoric mood, hallucinations, self-injury, sleep disturbance and suicidal ideas. The patient is not nervous/anxious and is not hyperactive. Physical Exam:   Vitals:  /72 (Site: Right Upper Arm, Position: Sitting, Cuff Size: Large Adult)   Pulse 65   Temp 98.6 °F (37 °C) (Temporal)   Resp 18   Ht 5' 7\" (1.702 m)   Wt 189 lb 3.2 oz (85.8 kg)   LMP  (LMP Unknown)   SpO2 99%   BMI 29.63 kg/m²     Physical Exam  Vitals signs and nursing note reviewed. Constitutional:       General: She is not in acute distress. Appearance: Normal appearance. She is not ill-appearing. HENT:      Head: Normocephalic and atraumatic. Nose: Mucosal edema and congestion present. No rhinorrhea. Mouth/Throat:      Mouth: Mucous membranes are moist.   Eyes:      General: No scleral icterus.      Conjunctiva/sclera: Conjunctivae lisinopril (PRINIVIL;ZESTRIL) 10 MG tablet    ALT    AST   2. Anxiety and depression     3. Thrombocytopenia (HCC)  CBC Auto Differential    CBC Auto Differential   4. Viral URI     5. Chronic bilateral low back pain, unspecified whether sciatica present  XR LUMBAR SPINE (2-3 VIEWS)   6. Tobacco abuse     7. Elevated TSH  TSH With Reflex Ft4   8. Colon cancer screening  POCT Fecal Immunochemical Test (FIT)   9. Screening mammogram, encounter for  KENISHA DIGITAL SCREEN W OR WO CAD BILATERAL     Essential hypertensionstable. Continue lisinopril 10 mg daily. Anxiety and depressionstable. Continue Zoloft 50 mg daily. Thrombocytopeniastable. If any changes I would like her to see hematology. We will check CBC now and again in 6 months. Chronic bilateral low back painwill obtain x-ray of her lumbar spine. Based off results she would likely benefit from physical therapy. We will start naproxen 220 mg twice daily with meals as needed for pain. Viral URIeducated on supportive care including rest, pushing fluids and trying Coricidin. Discussed if no improvement in about a week to contact the office. 1.  Rosalia Núñez received counseling on the following healthy behaviors: nutrition, exercise, medication adherence and tobacco cessation  2. Patient given educational materials - see patient instructions  3. Was a self-tracking handout given in paper form or via [employer]? No  If yes, see orders or list here. 4.  Discussed use, benefit, and side effects of prescribed medications. Barriers to medication compliance addressed. All patient questions answered. Pt voiced understanding. 5.  Reviewed prior labs and health maintenance  6. Continue current medications, diet and exercise.     Completed Refills   Requested Prescriptions     Signed Prescriptions Disp Refills    naproxen (NAPROSYN) 250 MG tablet 60 tablet 0     Sig: Take 1 tablet by mouth 2 times daily (with meals)    lisinopril (PRINIVIL;ZESTRIL) 10 MG tablet 90 tablet 1     Sig: Take 1 tablet by mouth daily         Return in about 6 months (around 11/6/2021).

## 2021-05-06 NOTE — PATIENT INSTRUCTIONS
a key part of your treatment and safety. Be sure to make and go to all appointments, and call your doctor if you are having problems. It's also a good idea to know your test results and keep a list of the medicines you take. How can you care for yourself at home? · Ask your family, friends, and coworkers for support. You have a better chance of quitting if you have help and support. · Join a support group, such as Nicotine Anonymous, for people who are trying to quit smoking. · Consider signing up for a smoking cessation program, such as the American Lung Association's Freedom from Smoking program.  · Get text messaging support. Go to the website at www.smokefree. gov to sign up for the [facility] program.  · Set a quit date. Pick your date carefully so that it is not right in the middle of a big deadline or stressful time. Once you quit, do not even take a puff. Get rid of all ashtrays and lighters after your last cigarette. Clean your house and your clothes so that they do not smell of smoke. · Learn how to be a nonsmoker. Think about ways you can avoid those things that make you reach for a cigarette. ? Avoid situations that put you at greatest risk for smoking. For some people, it is hard to have a drink with friends without smoking. For others, they might skip a coffee break with coworkers who smoke. ? Change your daily routine. Take a different route to work or eat a meal in a different place. · Cut down on stress. Calm yourself or release tension by doing an activity you enjoy, such as reading a book, taking a hot bath, or gardening. · Talk to your doctor or pharmacist about nicotine replacement therapy, which replaces the nicotine in your body. You still get nicotine but you do not use tobacco. Nicotine replacement products help you slowly reduce the amount of nicotine you need. These products come in several forms, many of them available over-the-counter:  ? Nicotine patches  ?  Nicotine gum and

## 2021-05-07 DIAGNOSIS — Z12.11 COLON CANCER SCREENING: ICD-10-CM

## 2021-05-07 LAB
CONTROL: NORMAL
HEMOCCULT STL QL: NORMAL

## 2021-05-07 PROCEDURE — 82274 ASSAY TEST FOR BLOOD FECAL: CPT | Performed by: NURSE PRACTITIONER

## 2021-05-10 ENCOUNTER — TELEPHONE (OUTPATIENT)
Dept: PRIMARY CARE CLINIC | Age: 56
End: 2021-05-10

## 2021-05-10 ASSESSMENT — ENCOUNTER SYMPTOMS
BACK PAIN: 1
COUGH: 1
SINUS PRESSURE: 1
RHINORRHEA: 1
TROUBLE SWALLOWING: 0

## 2021-05-10 NOTE — TELEPHONE ENCOUNTER
Can you please contact patient and let her know I did add a CBC for her to get now along with the thyroid blood work. She can have this drawn when she goes to the hospital for the x-ray. If she has any questions please have her contact the office. Thank you.

## 2021-05-10 NOTE — TELEPHONE ENCOUNTER
----- Message from CASTILLO Ga CNP sent at 5/7/2021  4:54 PM EDT -----  Please notify patient that their lab results are normal.

## 2021-05-11 NOTE — TELEPHONE ENCOUNTER
Contacted the patient in regards to having a CBC added to her thyroid blood work. Patient verbalized understanding.

## 2021-06-30 ENCOUNTER — HOSPITAL ENCOUNTER (OUTPATIENT)
Age: 56
Discharge: HOME | End: 2021-06-30
Payer: COMMERCIAL

## 2021-06-30 ENCOUNTER — HOSPITAL ENCOUNTER (OUTPATIENT)
Age: 56
Discharge: HOME | End: 2021-07-02
Payer: COMMERCIAL

## 2021-06-30 ENCOUNTER — HOSPITAL ENCOUNTER (OUTPATIENT)
Dept: WOMENS IMAGING | Age: 56
Discharge: HOME | End: 2021-07-02
Payer: COMMERCIAL

## 2021-06-30 ENCOUNTER — HOSPITAL ENCOUNTER (OUTPATIENT)
Dept: GENERAL RADIOLOGY | Age: 56
Discharge: HOME | End: 2021-07-02
Payer: COMMERCIAL

## 2021-06-30 DIAGNOSIS — G89.29 CHRONIC BILATERAL LOW BACK PAIN, UNSPECIFIED WHETHER SCIATICA PRESENT: ICD-10-CM

## 2021-06-30 DIAGNOSIS — D69.6 THROMBOCYTOPENIA (HCC): ICD-10-CM

## 2021-06-30 DIAGNOSIS — R79.89 ELEVATED TSH: ICD-10-CM

## 2021-06-30 DIAGNOSIS — M54.50 CHRONIC BILATERAL LOW BACK PAIN, UNSPECIFIED WHETHER SCIATICA PRESENT: ICD-10-CM

## 2021-06-30 DIAGNOSIS — Z12.31 SCREENING MAMMOGRAM, ENCOUNTER FOR: ICD-10-CM

## 2021-06-30 LAB
ABSOLUTE EOS #: 0.13 K/UL (ref 0–0.44)
ABSOLUTE IMMATURE GRANULOCYTE: <0.03 K/UL (ref 0–0.3)
ABSOLUTE LYMPH #: 1.13 K/UL (ref 1.1–3.7)
ABSOLUTE MONO #: 0.39 K/UL (ref 0.1–1.2)
BASOPHILS # BLD: 0 % (ref 0–2)
BASOPHILS ABSOLUTE: <0.03 K/UL (ref 0–0.2)
DIFFERENTIAL TYPE: ABNORMAL
EOSINOPHILS RELATIVE PERCENT: 2 % (ref 1–4)
HCT VFR BLD CALC: 41.5 % (ref 36.3–47.1)
HEMOGLOBIN: 13.6 G/DL (ref 11.9–15.1)
IMMATURE GRANULOCYTES: 0 %
LYMPHOCYTES # BLD: 21 % (ref 24–43)
MCH RBC QN AUTO: 30.2 PG (ref 25.2–33.5)
MCHC RBC AUTO-ENTMCNC: 32.8 G/DL (ref 28.4–34.8)
MCV RBC AUTO: 92 FL (ref 82.6–102.9)
MONOCYTES # BLD: 7 % (ref 3–12)
NRBC AUTOMATED: 0 PER 100 WBC
PDW BLD-RTO: 12.3 % (ref 11.8–14.4)
PLATELET # BLD: 105 K/UL (ref 138–453)
PLATELET ESTIMATE: ABNORMAL
PMV BLD AUTO: 12.1 FL (ref 8.1–13.5)
RBC # BLD: 4.51 M/UL (ref 3.95–5.11)
RBC # BLD: ABNORMAL 10*6/UL
SEG NEUTROPHILS: 70 % (ref 36–65)
SEGMENTED NEUTROPHILS ABSOLUTE COUNT: 3.77 K/UL (ref 1.5–8.1)
TSH SERPL DL<=0.05 MIU/L-ACNC: 5.01 MIU/L (ref 0.3–5)
WBC # BLD: 5.5 K/UL (ref 3.5–11.3)
WBC # BLD: ABNORMAL 10*3/UL

## 2021-06-30 PROCEDURE — 85025 COMPLETE CBC W/AUTO DIFF WBC: CPT

## 2021-06-30 PROCEDURE — 77063 BREAST TOMOSYNTHESIS BI: CPT

## 2021-06-30 PROCEDURE — 84443 ASSAY THYROID STIM HORMONE: CPT

## 2021-06-30 PROCEDURE — 36415 COLL VENOUS BLD VENIPUNCTURE: CPT

## 2021-06-30 PROCEDURE — 72100 X-RAY EXAM L-S SPINE 2/3 VWS: CPT

## 2021-06-30 PROCEDURE — 84439 ASSAY OF FREE THYROXINE: CPT

## 2021-07-01 LAB — THYROXINE, FREE: 0.97 NG/DL (ref 0.93–1.7)

## 2021-07-06 ENCOUNTER — TELEPHONE (OUTPATIENT)
Dept: PRIMARY CARE CLINIC | Age: 56
End: 2021-07-06

## 2021-07-06 DIAGNOSIS — M51.36 DEGENERATIVE DISC DISEASE, LUMBAR: Primary | ICD-10-CM

## 2021-07-06 DIAGNOSIS — D69.6 THROMBOCYTOPENIA (HCC): ICD-10-CM

## 2021-07-06 NOTE — TELEPHONE ENCOUNTER
----- Message from CASTILLO He - CNP sent at 7/6/2021  9:46 AM EDT -----  Blood work stable. Platelets 500. Recommend rechecking CBC in 3 months. Mammogram with no evidence of cancer. Continue yearly mammograms. X-ray of lumbar spine showed severe degenerative change. Recommend we get her set up with pain management, I will place these orders.  If she has any questions please let me know

## 2021-07-06 NOTE — TELEPHONE ENCOUNTER
Patient was referred to Dr. Bipin Mejia for pain management. Dr. Alex Castana office does not accept new patients with [facility]. Please advise.

## 2021-07-06 NOTE — TELEPHONE ENCOUNTER
Called patient and informed her that her blood work was stable and her platelets were 170 and Charli would like her to repeat CBC in 3 months. Informed that mammogram was normal and to continue yearly mammograms. Also informed that xray of lumbar spine showed severe degenerative changes. Informed that Darleen Huynh is recommend to get set ups with pain management. Verbalizes understanding.

## 2021-07-23 ENCOUNTER — TELEPHONE (OUTPATIENT)
Dept: PAIN MANAGEMENT | Age: 56
End: 2021-07-23

## 2021-08-16 ENCOUNTER — NURSE TRIAGE (OUTPATIENT)
Dept: OTHER | Facility: CLINIC | Age: 56
End: 2021-08-16

## 2021-08-16 ENCOUNTER — APPOINTMENT (OUTPATIENT)
Dept: GENERAL RADIOLOGY | Age: 56
End: 2021-08-16
Payer: COMMERCIAL

## 2021-08-16 ENCOUNTER — TELEPHONE (OUTPATIENT)
Dept: PRIMARY CARE CLINIC | Age: 56
End: 2021-08-16

## 2021-08-16 ENCOUNTER — HOSPITAL ENCOUNTER (EMERGENCY)
Age: 56
Discharge: LEFT BEFORE DISCHARGE | End: 2021-08-16
Payer: COMMERCIAL

## 2021-08-16 VITALS
RESPIRATION RATE: 16 BRPM | OXYGEN SATURATION: 97 % | DIASTOLIC BLOOD PRESSURE: 67 MMHG | SYSTOLIC BLOOD PRESSURE: 124 MMHG | TEMPERATURE: 97.6 F | HEART RATE: 64 BPM

## 2021-08-16 DIAGNOSIS — S89.91XA INJURY OF RIGHT KNEE, INITIAL ENCOUNTER: Primary | ICD-10-CM

## 2021-08-16 PROCEDURE — 99283 EMERGENCY DEPT VISIT LOW MDM: CPT

## 2021-08-16 PROCEDURE — 73562 X-RAY EXAM OF KNEE 3: CPT

## 2021-08-16 RX ORDER — IBUPROFEN 800 MG/1
800 TABLET ORAL EVERY 6 HOURS PRN
COMMUNITY

## 2021-08-16 ASSESSMENT — ENCOUNTER SYMPTOMS
VOMITING: 0
EYE DISCHARGE: 0
EYE REDNESS: 0
CHEST TIGHTNESS: 0
RHINORRHEA: 0
SHORTNESS OF BREATH: 0
SORE THROAT: 0
BLOOD IN STOOL: 0
COUGH: 0
CONSTIPATION: 0
WHEEZING: 0
ABDOMINAL PAIN: 0
DIARRHEA: 0
BACK PAIN: 0
NAUSEA: 0

## 2021-08-16 ASSESSMENT — PAIN DESCRIPTION - ORIENTATION: ORIENTATION: RIGHT

## 2021-08-16 ASSESSMENT — PAIN SCALES - GENERAL: PAINLEVEL_OUTOF10: 3

## 2021-08-16 ASSESSMENT — PAIN DESCRIPTION - PAIN TYPE: TYPE: ACUTE PAIN

## 2021-08-16 ASSESSMENT — PAIN DESCRIPTION - DESCRIPTORS: DESCRIPTORS: SHARP;STABBING

## 2021-08-16 ASSESSMENT — PAIN DESCRIPTION - FREQUENCY: FREQUENCY: CONTINUOUS

## 2021-08-16 ASSESSMENT — PAIN DESCRIPTION - LOCATION: LOCATION: KNEE

## 2021-08-16 NOTE — ED PROVIDER NOTES
[facility] ED  EMERGENCY DEPARTMENT ENCOUNTER      Pt Name: Clint Tavera  MRN: [id number]  [place] 1965  Date of evaluation: 8/16/2021  Provider: Alma Hardin Stem Nayan     Chief Complaint   Patient presents with    Knee Pain     right knee pain onset yesterday after stomping on a spider         HISTORY OF PRESENT ILLNESS   (Location/Symptom, Timing/Onset, Context/Setting,Quality, Duration, Modifying Factors, Severity)  Note limiting factors. Clint Tavera is a53 y.o. female who presents to the emergency department with complaints of right knee pain that started 1 day ago after she was stomping hard on a spider. Patient reports that she has had pain with walking since this occurred. She denies any foot or ankle pain denies any hip pain. She has tried some over-the-counter medication without full relief of her symptoms. Reports that now when she walks she has difficulty bearing weight on her right knee. She denies any numbness or tingling sensation denies any calf pain denies any chest pain denies shortness of breath denies any fever or chills. She has no other complaints at this time. HPI    Nursing Notes werereviewed. REVIEW OF SYSTEMS    (2-9 systems for level 4, 10 or more for level 5)     Review of Systems   Constitutional: Negative for chills, diaphoresis and fever. HENT: Negative for congestion, ear pain, rhinorrhea and sore throat. Eyes: Negative for discharge, redness and visual disturbance. Respiratory: Negative for cough, chest tightness, shortness of breath and wheezing. Cardiovascular: Negative for chest pain and palpitations. Gastrointestinal: Negative for abdominal pain, blood in stool, constipation, diarrhea, nausea and vomiting. Endocrine: Negative for polydipsia, polyphagia and polyuria. Genitourinary: Negative for decreased urine volume, difficulty urinating, dysuria, frequency and hematuria. Musculoskeletal: Positive for arthralgias. Negative for back pain and myalgias. Skin: Negative for pallor and rash. Allergic/Immunologic: Negative for food allergies and immunocompromised state. Neurological: Negative for dizziness, syncope, weakness and light-headedness. Hematological: Negative for adenopathy. Does not bruise/bleed easily. Psychiatric/Behavioral: Negative for behavioral problems and suicidal ideas. The patient is not nervous/anxious. Except as noted above the remainder of the review of systems was reviewed and negative. PAST MEDICAL HISTORY     Past Medical History:   Diagnosis Date    GERD (gastroesophageal reflux disease)     Headache     Insomnia          SURGICALHISTORY     History reviewed. No pertinent surgical history. CURRENT MEDICATIONS       Previous Medications    CHOLECALCIFEROL (VITAMIN D3) 50 MCG (2000 UT) CAPS    Take by mouth daily    IBUPROFEN (ADVIL;MOTRIN) 800 MG TABLET    Take 800 mg by mouth every 6 hours as needed for Pain    LISINOPRIL (PRINIVIL;ZESTRIL) 10 MG TABLET    Take 1 tablet by mouth daily    NAPROXEN (NAPROSYN) 250 MG TABLET    Take 1 tablet by mouth 2 times daily (with meals)    SERTRALINE (ZOLOFT) 50 MG TABLET    Take 1 tablet by mouth daily    VITAMIN B-12 (CYANOCOBALAMIN) 50 MCG TABLET    Take 50 mcg by mouth daily         ALLERGIES   Patient has no known allergies. FAMILY HISTORY       Family History   Problem Relation Age of Onset    Breast Cancer Maternal Grandmother           SOCIAL HISTORY       Social History     Socioeconomic History    Marital status:       Spouse name: None    Number of children: None    Years of education: None    Highest education level: None   Occupational History    None   Tobacco Use    Smoking status: Former Smoker    Smokeless tobacco: Never Used   Substance and Sexual Activity    Alcohol use: No    Drug use: No    Sexual activity: None   Other Topics Concern    None   Social History Narrative    None     Social Determinants of Health     Financial Resource Strain:     Difficulty of Paying Living Expenses:    Food Insecurity:     Worried About Running Out of Food in the Last Year:     [address] in the Last Year:    Transportation Needs:     Lack of Transportation (Medical):  Lack of Transportation (Non-Medical):    Physical Activity:     Days of Exercise per Week:     Minutes of Exercise per Session:    Stress:     Feeling of Stress :    Social Connections:     Frequency of Communication with Friends and Family:     Frequency of Social Gatherings with Friends and Family:     Attends Islam Services:     Active Member of Clubs or Organizations:     Attends Club or Organization Meetings:     Marital Status:    Intimate Partner Violence:     Fear of Current or Ex-Partner:     Emotionally Abused:     Physically Abused:     Sexually Abused:        SCREENINGS             PHYSICAL EXAM    (up to 7 for level 4, 8 or more for level 5)     ED Triage Vitals [08/16/21 1137]   BP Temp Temp Source Pulse Resp SpO2 Height Weight   124/67 97.6 °F (36.4 °C) Tympanic 64 16 97 % -- --       Physical Exam  Vitals and nursing note reviewed. Constitutional:       General: She is not in acute distress. Appearance: Normal appearance. She is well-developed. She is not ill-appearing, toxic-appearing or diaphoretic. HENT:      Head: Normocephalic and atraumatic. Right Ear: External ear normal.      Left Ear: External ear normal.   Eyes:      General: No scleral icterus. Right eye: No discharge. Left eye: No discharge. Conjunctiva/sclera: Conjunctivae normal.   Neck:      Trachea: No tracheal deviation. Cardiovascular:      Rate and Rhythm: Normal rate and regular rhythm. Pulses: Normal pulses. Heart sounds: No murmur heard. Pulmonary:      Effort: Pulmonary effort is normal. No respiratory distress. Breath sounds: No stridor. No wheezing, rhonchi or rales. Musculoskeletal:         General: Tenderness present. No deformity. Normal range of motion. Cervical back: Normal range of motion and neck supple. Comments: Patient has tenderness over the anterior right knee. There is pain with valgus. No ACL laxity. Negative Elier's test.  There is no calf tenderness negative Homans' sign. No swelling right greater than left legs are symmetrical in circumference. No erythema no warmth. There is no tenderness of the upper right leg. Intact distal pulses sensation cubicles less than 2 seconds. Skin:     General: Skin is warm and dry. Capillary Refill: Capillary refill takes less than 2 seconds. Findings: No erythema or rash. Neurological:      General: No focal deficit present. Mental Status: She is alert and oriented to person, place, and time. Cranial Nerves: No cranial nerve deficit. Motor: No abnormal muscle tone. Deep Tendon Reflexes: Reflexes are normal and symmetric. Psychiatric:         Behavior: Behavior normal.         DIAGNOSTIC RESULTS     EKG: All EKG's are interpreted by the Emergency Department Physician who either signs orCo-signs this chart in the absence of a cardiologist.      RADIOLOGY:   Non-plainfilm images such as CT, Ultrasound and MRI are read by the radiologist. Plain radiographic images are visualized and preliminarily interpreted by the emergency physician with the below findings:      Interpretationper the Radiologist below, if available at the time of this note:    XR KNEE RIGHT (3 VIEWS)   Final Result   Mild degenerative changes               ED BEDSIDE ULTRASOUND:   Performed by ED Physician - none    LABS:  Labs Reviewed - No data to display    All other labs were within normal range or not returned as of this dictation.     EMERGENCY DEPARTMENT COURSE and DIFFERENTIAL DIAGNOSIS/MDM:   Vitals:    Vitals:    08/16/21 1137   BP: 124/67   Pulse: 64   Resp: 16   Temp: 97.6 °F (36.4 °C)   TempSrc: Tympanic   SpO2: 97%         [facility]  80-year-old female who presents secondary to right knee discomfort after she stepped down hard on her leg yesterday. On exam has some tenderness in the anterior patellar region. There is no swelling right greater than left. There is no erythema no warmth no calf tenderness negative Homans' sign. At this point will get x-ray to rule acute fracture subluxation or other bony normality. Is likely a sprain. Very low suspicion for DVT after there was an injury which then caused the pain pain did not happen prior to the event. Patient is resting comfortably at this time and in no distress. I have updated patient on current results. We discussed at length the patient's diagnosis. Patient understands to follow up with primary care provider in the next 2 days. Patient verbalized understanding of this. We went over medications. Strict return warnings were given. Patient will return to the emergency room as needed with new or worsening complaints. Patient has been given information for orthopedic follow-up. They will call tomorrow to arrange follow-up within the next 48 hours. Procedures    FINAL IMPRESSION      1.  Injury of right knee, initial encounter        DISPOSITION/PLAN   DISPOSITION Decision To Discharge 08/16/2021 12:31:27 PM      PATIENT REFERRED TO:  [facility] ED  [address]  [phone]    If symptoms worsen, As needed    Morena Brooks MD  [facility] [address] [place] [address],[address]  [phone]    Schedule an appointment as soon as possible for a visit in 1 day      CASTILLO Kirkpatrick - CNP  [facility]  [id number]  [phone]            DISCHARGE MEDICATIONS:  New Prescriptions    No medications on file              Summation      Patient Course:      ED Medications administered this visit:  Medications - No data to display    New Prescriptions from this visit:    New Prescriptions    No medications on file Follow-up:  [facility] ED  [address]  [address]  [phone]    If symptoms worsen, As needed    MD Derick [facility] [address] [place] [address],[address]  [phone]    Schedule an appointment as soon as possible for a visit in 1 day      CASTILLO Glynn - SUNDAR  [facility] [address]  [phone]              Final Impression:   1.  Injury of right knee, initial encounter               (Please note that portions of this note were completed with a voice recognition program.  Efforts were made to edit the dictations but occasionally words are mis-transcribed.)           Saida Trammell PA-C  08/16/21 [id number]

## 2021-08-16 NOTE — TELEPHONE ENCOUNTER
Pt transferred from Call Center (advised pt needed to be seen within 4 hrs since call was triaged)    Pt's provider not available, Walk In only option and symptom is not for walk in treatment. Advised pt to go to ED.

## 2021-08-16 NOTE — ED NOTES
Entered patient's room to finish discharge orders, but patient not in room. Writer walked through ER department and ER lobby but could not find patient. [facility] PA notified.      Ida Woods RN  08/16/21 [id number]

## 2021-08-20 ENCOUNTER — TELEPHONE (OUTPATIENT)
Dept: PRIMARY CARE CLINIC | Age: 56
End: 2021-08-20

## 2021-08-20 NOTE — TELEPHONE ENCOUNTER
----- Message from Poppy Sanchez sent at 8/20/2021 10:35 AM EDT -----  Subject: Appointment Request    Reason for Call: Routine ED Follow Up Visit    QUESTIONS  Type of Appointment? Established Patient  Reason for appointment request? No appointments available during search  Additional Information for Provider? Pt is requesting an ED follow up for   her RT knee. Pt went to [facility]. Please call Pt to discuss   scheduling.  ---------------------------------------------------------------------------  --------------  CALL BACK INFO  What is the best way for the office to contact you? Do not leave any   message, patient will call back for answer  Preferred Call Back Phone Number? [phone]  ---------------------------------------------------------------------------  --------------  SCRIPT ANSWERS  Relationship to Patient? Self  (Patient requests to see provider urgently. )? No  Do you have any questions for your primary care provider that need to be   answered prior to your appointment? No  Have you been diagnosed with, awaiting test results for, or told that you   are suspected of having COVID-19 (Coronavirus)? (If patient has tested   negative or was tested as a requirement for work, school, or travel and   not based on symptoms, answer no)? No  Do you currently have flu-like symptoms including fever or chills, cough,   shortness of breath, difficulty breathing, or new loss of taste or smell? No  Have you had close contact with someone with COVID-19 in the last 14 days? No  (Service Expert  click yes below to proceed with [facility] As Usual   Scheduling)?  Yes

## 2021-08-20 NOTE — TELEPHONE ENCOUNTER
[facility] Initial Follow Up Call    Outreach made within 2 business days of discharge: Yes    Patient: Dennis Ramirez Patient : 1965   MRN: [id number]  Reason for Admission: There are no discharge diagnoses documented for the most recent discharge. Discharge Date: 21       Spoke with: Anthony Tam    Discharge department/facility: [facility]    TCM Interactive Patient Contact:  Was patient able to fill all prescriptions: No  Was patient instructed to bring all medications to the follow-up visit: Yes  Is patient taking all medications as directed in the discharge summary?  No  Does patient understand their discharge instructions: Yes  Does patient have questions or concerns that need addressed prior to 7-14 day follow up office visit: no    Scheduled appointment with PCP within 7-14 days    Follow Up  Future Appointments   Date Time Provider Jeramy Barney   2021 10:00 AM [facility]   11/10/2021  9:00 AM CASTILLO Norman CNP [facility]       Wendi Sutton CMA

## 2021-08-24 ENCOUNTER — OFFICE VISIT (OUTPATIENT)
Dept: PRIMARY CARE CLINIC | Age: 56
End: 2021-08-24
Payer: COMMERCIAL

## 2021-08-24 VITALS
HEART RATE: 68 BPM | SYSTOLIC BLOOD PRESSURE: 124 MMHG | WEIGHT: 192.6 LBS | TEMPERATURE: 98 F | HEIGHT: 67 IN | RESPIRATION RATE: 18 BRPM | BODY MASS INDEX: 30.23 KG/M2 | DIASTOLIC BLOOD PRESSURE: 77 MMHG | OXYGEN SATURATION: 98 %

## 2021-08-24 DIAGNOSIS — S83.91XS SPRAIN OF RIGHT KNEE, UNSPECIFIED LIGAMENT, SEQUELA: Primary | ICD-10-CM

## 2021-08-24 PROCEDURE — 3017F COLORECTAL CA SCREEN DOC REV: CPT | Performed by: NURSE PRACTITIONER

## 2021-08-24 PROCEDURE — G8417 CALC BMI ABV UP PARAM F/U: HCPCS | Performed by: NURSE PRACTITIONER

## 2021-08-24 PROCEDURE — 1036F TOBACCO NON-USER: CPT | Performed by: NURSE PRACTITIONER

## 2021-08-24 PROCEDURE — 99213 OFFICE O/P EST LOW 20 MIN: CPT | Performed by: NURSE PRACTITIONER

## 2021-08-24 PROCEDURE — G8427 DOCREV CUR MEDS BY ELIG CLIN: HCPCS | Performed by: NURSE PRACTITIONER

## 2021-08-24 SDOH — ECONOMIC STABILITY: FOOD INSECURITY: WITHIN THE PAST 12 MONTHS, YOU WORRIED THAT YOUR FOOD WOULD RUN OUT BEFORE YOU GOT MONEY TO BUY MORE.: NEVER TRUE

## 2021-08-24 SDOH — ECONOMIC STABILITY: FOOD INSECURITY: WITHIN THE PAST 12 MONTHS, THE FOOD YOU BOUGHT JUST DIDN'T LAST AND YOU DIDN'T HAVE MONEY TO GET MORE.: NEVER TRUE

## 2021-08-24 ASSESSMENT — ENCOUNTER SYMPTOMS
VOMITING: 0
NAUSEA: 0
SHORTNESS OF BREATH: 0
WHEEZING: 0
COUGH: 0

## 2021-08-24 ASSESSMENT — SOCIAL DETERMINANTS OF HEALTH (SDOH): HOW HARD IS IT FOR YOU TO PAY FOR THE VERY BASICS LIKE FOOD, HOUSING, MEDICAL CARE, AND HEATING?: NOT HARD AT ALL

## 2021-08-24 NOTE — PROGRESS NOTES
Name: Luis Alberto Kowalski  : 1965         Chief Complaint:     Chief Complaint   Patient presents with    ED Follow-up     Right knee pain from stomping on spider. Feeling better       History of Present Illness:      Luis Alberto Kowalski is a 55 y.o.  female who presents with ED Follow-up (Right knee pain from stomping on spider. Feeling better)      Brad Wilkinson is here today for a ER follow-up. She presented to the emergency department with complaints of right knee pain that started on  after she was stomping hard on a spider. Patient reports that she has had pain with walking since this occurred. She denies any foot or ankle pain denies any hip pain. She has tried some over-the-counter medication without full relief of her symptoms. Reports that now when she walks she has difficulty bearing weight on her right knee. She denies any numbness or tingling sensation denies any calf pain denies any chest pain denies shortness of breath denies any fever or chills. She has no other complaints at this time. Update- She reports she is doing a lot better. She reports swelling has significantly improved. She denies any instability of her knee. She reports when the incident happened pain was 10 out of 10. She reports pain is 2-3 with walking on it, maybe a 1 at rest.   In the ER they encouraged her to follow-up with orthopedics however she has not done this because she does not feel that she needs to. She has been taking ibuprofen 800 mg as needed with good relief. She has also been putting heat to the area. Denies any pain in her foot, ankle, hip or calf. She has been able to bear weight. Past Medical History:     Past Medical History:   Diagnosis Date    GERD (gastroesophageal reflux disease)     Headache     Insomnia       Reviewed all health maintenance requirements and ordered appropriate tests  There are no preventive care reminders to display for this patient.     Past Surgical History:     No past surgical history on file. Medications:       Prior to Admission medications    Medication Sig Start Date End Date Taking? Authorizing Provider   ibuprofen (ADVIL;MOTRIN) 800 MG tablet Take 800 mg by mouth every 6 hours as needed for Pain   Yes Historical Provider, MD   lisinopril (PRINIVIL;ZESTRIL) 10 MG tablet Take 1 tablet by mouth daily 5/6/21  Yes CASTILLO Douglas CNP   Cholecalciferol (VITAMIN D3) 50 MCG (2000 UT) CAPS Take by mouth daily   Yes Historical Provider, MD   vitamin B-12 (CYANOCOBALAMIN) 50 MCG tablet Take 50 mcg by mouth daily   Yes Historical Provider, MD   sertraline (ZOLOFT) 50 MG tablet Take 1 tablet by mouth daily 3/3/21  Yes CASTILLO Douglas CNP        Allergies:       Patient has no known allergies. Social History:     Tobacco:    reports that she has quit smoking. She has never used smokeless tobacco.  Alcohol:      reports no history of alcohol use. Drug Use:  reports no history of drug use. Family History:     Family History   Problem Relation Age of Onset    Breast Cancer Maternal Grandmother        Review of Systems:     Positive and Negative as described in HPI    Review of Systems   Constitutional: Negative for activity change and appetite change. Respiratory: Negative for cough, shortness of breath and wheezing. Gastrointestinal: Negative for nausea and vomiting. Physical Exam:   Vitals:  /77 (Site: Right Upper Arm, Position: Sitting, Cuff Size: Large Adult)   Pulse 68   Temp 98 °F (36.7 °C) (Temporal)   Resp 18   Ht 5' 7\" (1.702 m)   Wt 192 lb 9.6 oz (87.4 kg)   LMP  (LMP Unknown)   SpO2 98%   BMI 30.17 kg/m²     Physical Exam  Vitals and nursing note reviewed. Constitutional:       General: She is not in acute distress. Appearance: Normal appearance. She is not toxic-appearing or diaphoretic. HENT:      Head: Normocephalic and atraumatic. Cardiovascular:      Rate and Rhythm: Normal rate and regular rhythm.       Heart sounds: No murmur heard. Pulmonary:      Effort: Pulmonary effort is normal.      Breath sounds: Normal breath sounds. No wheezing, rhonchi or rales. Musculoskeletal:      Cervical back: Normal range of motion and neck supple. Right knee: No deformity, effusion, erythema or ecchymosis. Normal range of motion. Tenderness present. No ACL tenderness. Instability Tests: Anterior drawer test negative. Medial Elier test negative and lateral Elier test negative. Right lower leg: No edema. Left lower leg: No edema. Legs:    Neurological:      General: No focal deficit present. Mental Status: She is alert and oriented to person, place, and time. Psychiatric:         Mood and Affect: Mood normal.         Behavior: Behavior normal.         Data:     Lab Results   Component Value Date     11/25/2020    K 4.5 11/25/2020     11/25/2020    CO2 26 11/25/2020    BUN 21 11/25/2020    CREATININE 0.80 11/25/2020    GLUCOSE 84 11/25/2020    PROT 7.3 08/02/2019    LABALBU 4.6 08/02/2019    BILITOT 0.61 08/02/2019    ALKPHOS 56 08/02/2019    AST 17 11/25/2020    ALT 17 11/25/2020     Lab Results   Component Value Date    WBC 5.5 06/30/2021    RBC 4.51 06/30/2021    HGB 13.6 06/30/2021    HCT 41.5 06/30/2021    MCV 92.0 06/30/2021    MCH 30.2 06/30/2021    MCHC 32.8 06/30/2021    RDW 12.3 06/30/2021     06/30/2021    MPV 12.1 06/30/2021     Lab Results   Component Value Date    TSH 5.01 06/30/2021     Lab Results   Component Value Date    CHOL 172 11/25/2020    HDL 63 11/25/2020       Assessment/Plan:      Diagnosis Orders   1. Sprain of right knee     She likely has a sprain of her right knee. She is progressing well. I did discuss with her referral to Ortho for assessment however she does not want to see them unless anything worsens. I believe this is reasonable. She can continue ibuprofen as needed for pain.   I did discuss with her if pain extends outside of of 3 weeks I would strongly recommend Ortho evaluation. She verbalized understanding. 1.  Abby Fields received counseling on the following healthy behaviors: nutrition, exercise and medication adherence  2. Patient given educational materials - see patient instructions  3. Was a self-tracking handout given in paper form or via [employer]? No  If yes, see orders or list here. 4.  Discussed use, benefit, and side effects of prescribed medications. Barriers to medication compliance addressed. All patient questions answered. Pt voiced understanding. 5.  Reviewed prior labs and health maintenance  6. Continue current medications, diet and exercise. Completed Refills   Requested Prescriptions      No prescriptions requested or ordered in this encounter         Return if symptoms worsen or fail to improve.

## 2021-08-24 NOTE — PATIENT INSTRUCTIONS
SURVEY:    You may be receiving a survey from [employer] regarding your visit today. Please complete the survey to enable us to provide the highest quality of care to you and your family. If you cannot score us a very good on any question, please call the office to discuss how we could have made your experience a very good one. Thank you.   Jamel Silverman, APRN-CNP  Charli Mathews, APRN-CNP  CYNDI Kay LPN [place], [place] Elige Piety, MA  Otilia, PCA

## 2021-09-22 DIAGNOSIS — F41.9 ANXIETY AND DEPRESSION: ICD-10-CM

## 2021-09-22 DIAGNOSIS — F32.A ANXIETY AND DEPRESSION: ICD-10-CM

## 2021-09-22 NOTE — TELEPHONE ENCOUNTER
Health Maintenance   Topic Date Due    Cervical cancer screen  Never done    Flu vaccine (1) Never done    COVID-19 Vaccine (1) 05/06/2022 (Originally 5/19/1978)    Hepatitis C screen  05/06/2022 (Originally 1965)    DTaP/Tdap/Td vaccine (1 - Tdap) 08/24/2022 (Originally 5/19/1985)    Shingles Vaccine (1 of 2) 08/24/2022 (Originally 5/19/2016)    HIV screen  08/24/2022 (Originally 5/19/1981)    Potassium monitoring  11/25/2021    Creatinine monitoring  11/25/2021    Colon Cancer Screen FIT/FOBT  05/07/2022    Breast cancer screen  06/30/2023    Lipid screen  11/25/2025    Hepatitis A vaccine  Aged Out    Hepatitis B vaccine  Aged Out    Hib vaccine  Aged Out    Meningococcal (ACWY) vaccine  Aged Out    Pneumococcal 0-64 years Vaccine  Aged Out             (applicable per patient's age: Cancer Screenings, Depression Screening, Fall Risk Screening, Immunizations)    LDL Cholesterol (mg/dL)   Date Value   11/25/2020 94     AST (U/L)   Date Value   11/25/2020 17     ALT (U/L)   Date Value   11/25/2020 17     BUN (mg/dL)   Date Value   11/25/2020 21 (H)      (goal A1C is < 7)   (goal LDL is <100) need 30-50% reduction from baseline     BP Readings from Last 3 Encounters:   08/24/21 124/77   08/16/21 124/67   05/06/21 112/72    (goal /80)      All Future Testing planned in [facility]:  Lab Frequency Next Occurrence   Basic Metabolic Panel Once 46/48/5721   Lipid Panel Once 11/02/2021   ALT Once 11/06/2021   AST Once 11/06/2021   CBC Auto Differential Once 10/04/2021       Next Visit Date:  Future Appointments   Date Time Provider Jeramy Barney   11/10/2021  9:00 AM Suzzane Meigs, APRN - CNP [facility]            Patient Active Problem List:     Thrombocytopenia [facility])     Essential hypertension

## 2021-11-10 ENCOUNTER — OFFICE VISIT (OUTPATIENT)
Dept: PRIMARY CARE CLINIC | Age: 56
End: 2021-11-10
Payer: COMMERCIAL

## 2021-11-10 VITALS
DIASTOLIC BLOOD PRESSURE: 56 MMHG | WEIGHT: 198.6 LBS | TEMPERATURE: 99 F | HEIGHT: 68 IN | SYSTOLIC BLOOD PRESSURE: 108 MMHG | RESPIRATION RATE: 18 BRPM | BODY MASS INDEX: 30.1 KG/M2 | HEART RATE: 72 BPM

## 2021-11-10 DIAGNOSIS — M72.2 PLANTAR FASCIITIS OF LEFT FOOT: Primary | ICD-10-CM

## 2021-11-10 DIAGNOSIS — F41.9 ANXIETY AND DEPRESSION: ICD-10-CM

## 2021-11-10 DIAGNOSIS — F32.A ANXIETY AND DEPRESSION: ICD-10-CM

## 2021-11-10 DIAGNOSIS — I10 ESSENTIAL HYPERTENSION: ICD-10-CM

## 2021-11-10 PROCEDURE — G8417 CALC BMI ABV UP PARAM F/U: HCPCS | Performed by: NURSE PRACTITIONER

## 2021-11-10 PROCEDURE — 3017F COLORECTAL CA SCREEN DOC REV: CPT | Performed by: NURSE PRACTITIONER

## 2021-11-10 PROCEDURE — G8484 FLU IMMUNIZE NO ADMIN: HCPCS | Performed by: NURSE PRACTITIONER

## 2021-11-10 PROCEDURE — 99214 OFFICE O/P EST MOD 30 MIN: CPT | Performed by: NURSE PRACTITIONER

## 2021-11-10 PROCEDURE — 1036F TOBACCO NON-USER: CPT | Performed by: NURSE PRACTITIONER

## 2021-11-10 PROCEDURE — G8427 DOCREV CUR MEDS BY ELIG CLIN: HCPCS | Performed by: NURSE PRACTITIONER

## 2021-11-10 ASSESSMENT — ENCOUNTER SYMPTOMS
ALLERGIC/IMMUNOLOGIC NEGATIVE: 1
GASTROINTESTINAL NEGATIVE: 1
EYES NEGATIVE: 1
RESPIRATORY NEGATIVE: 1

## 2021-11-10 NOTE — PATIENT INSTRUCTIONS
Patient Education        Plantar Fasciitis: Exercises  Introduction  Here are some examples of exercises for you to try. The exercises may be suggested for a condition or for rehabilitation. Start each exercise slowly. Ease off the exercises if you start to have pain. You will be told when to start these exercises and which ones will work best for you. How to do the exercises  Towel stretch    1. Sit with your legs extended and knees straight. 2. Place a towel around your foot just under the toes. 3. Hold each end of the towel in each hand, with your hands above your knees. 4. Pull back with the towel so that your foot stretches toward you. 5. Hold the position for at least 15 to 30 seconds. 6. Repeat 2 to 4 times a session, up to 5 sessions a day. Calf stretch    This exercise stretches the muscles at the back of the lower leg (the calf) and the Achilles tendon. Do this exercise 3 or 4 times a day, 5 days a week. 1. Stand facing a wall with your hands on the wall at about eye level. Put the leg you want to stretch about a step behind your other leg. 2. Keeping your back heel on the floor, bend your front knee until you feel a stretch in the back leg. 3. Hold the stretch for 15 to 30 seconds. Repeat 2 to 4 times. Plantar fascia and calf stretch    Stretching the plantar fascia and calf muscles can increase flexibility and decrease heel pain. You can do this exercise several times each day and before and after activity. 1. Stand on a step as shown above. Be sure to hold on to the banister. 2. Slowly let your heels down over the edge of the step as you relax your calf muscles. You should feel a gentle stretch across the bottom of your foot and up the back of your leg to your knee. 3. Hold the stretch about 15 to 30 seconds, and then tighten your calf muscle a little to bring your heel back up to the level of the step. Repeat 2 to 4 times.   Towel curls    Make this exercise more challenging by placing a weighted object, such as a soup can, on the other end of the towel. 1. While sitting, place your foot on a towel on the floor and scrunch the towel toward you with your toes. 2. Then, also using your toes, push the towel away from you. Dickens pickups    1. Put marbles on the floor next to a cup.  2. Using your toes, try to lift the marbles up from the floor and put them in the cup. Follow-up care is a key part of your treatment and safety. Be sure to make and go to all appointments, and call your doctor if you are having problems. It's also a good idea to know your test results and keep a list of the medicines you take. Where can you learn more? Go to https://[employer].OneRiot. org and sign in to your MeetLinks[employer] account. Enter W767 in the Star[employer] box to learn more about \"Plantar Fasciitis: Exercises. \"     If you do not have an account, please click on the \"Sign Up Now\" link. Current as of: July 1, 2021               Content Version: 13.0  © 2006-2021 [employer]. Care instructions adapted under license by [facility] ([facility]). If you have questions about a medical condition or this instruction, always ask your healthcare professional. [address] any warranty or liability for your use of this information. Patient Education        Plantar Fasciitis: Care Instructions  Overview     Plantar fasciitis is pain and inflammation of the plantar fascia, the tissue at the bottom of your foot that connects the heel bone to the toes. The plantar fascia also supports the arch. If you strain the plantar fascia, it can develop small tears and cause heel pain when you stand or walk. Plantar fasciitis can be caused by running or other sports. It also may occur in people who are overweight or who have high arches or flat feet. You may get plantar fasciitis if you walk or stand for long periods, or have a tight Achilles tendon or calf muscles.   You can improve your foot pain with rest and other care at home. It might take a few weeks to a few months for your foot to heal completely. Follow-up care is a key part of your treatment and safety. Be sure to make and go to all appointments, and call your doctor if you are having problems. It's also a good idea to know your test results and keep a list of the medicines you take. How can you care for yourself at home? · Rest your feet often. Reduce your activity to a level that lets you avoid pain. If possible, do not run or walk on hard surfaces. · Take pain medicines exactly as directed. ? If the doctor gave you a prescription medicine for pain, take it as prescribed. ? If you are not taking a prescription pain medicine, take an over-the-counter anti-inflammatory medicine for pain and swelling, such as ibuprofen (Advil, Motrin) or naproxen (Aleve). Read and follow all instructions on the label. · Use ice massage to help with pain and swelling. You can use an ice cube or an ice cup several times a day. To make an ice cup, fill a paper cup with water and freeze it. Cut off the top of the cup until a half-inch of ice shows. Hold onto the remaining paper to use the cup. Rub the ice in small circles over the area for 5 to 7 minutes. · Contrast baths, which alternate hot and cold water, can also help reduce swelling. But because heat alone may make pain and swelling worse, end a contrast bath with a soak in cold water. · Wear a night splint if your doctor suggests it. A night splint holds your foot with the toes pointed up and the foot and ankle at a 90-degree angle. This position gives the bottom of your foot a constant, gentle stretch. · Do simple exercises such as calf stretches and towel stretches 2 to 3 times each day, especially when you first get up in the morning. These can help the plantar fascia become more flexible. They also make the muscles that support your arch stronger.  Hold these stretches for 15 to 30 seconds per stretch. Repeat 2 to 4 times. ? Stand about 1 foot from a wall. Place the palms of both hands against the wall at chest level. Lean forward against the wall, keeping one leg with the knee straight and heel on the ground while bending the knee of the other leg.  ? Sit down on the floor or a mat with your feet stretched in front of you. Roll up a towel lengthwise, and loop it over the ball of your foot. Holding the towel at both ends, gently pull the towel toward you to stretch your foot. · Wear shoes with good arch support. Athletic shoes or shoes with a well-cushioned sole are good choices. · Replace athletic shoes regularly. · Try heel cups or shoe inserts (orthotics) to help cushion your heel. You can buy these at many shoe stores. · Put on your shoes as soon as you get out of bed. Going barefoot or wearing slippers may make your pain worse. · Reach and stay at a good weight for your height. This puts less strain on your feet. When should you call for help? Call your doctor now or seek immediate medical care if:    · You have heel pain with fever, redness, or warmth in your heel.     · You cannot put weight on the sore foot. Watch closely for changes in your health, and be sure to contact your doctor if:    · You have numbness or tingling in your heel.     · Your heel pain lasts more than 2 weeks. Where can you learn more? Go to https://Snap FitnesspeBioscale.Bour[employer] & [employer]s. org and sign in to your [employer] account. Enter K227 in the Located [facility] box to learn more about \"Plantar Fasciitis: Care Instructions. \"     If you do not have an account, please click on the \"Sign Up Now\" link. Current as of: July 1, 2021               Content Version: 13.0  © 2006-2021 Healthwise, Incorporated. Care instructions adapted under license by [facility] ([facility]).  If you have questions about a medical condition or this instruction, always ask your healthcare professional. [address]  any warranty or liability for your use of this information.

## 2021-11-10 NOTE — PROGRESS NOTES
Name: Nito Contreras  : 1965         Chief Complaint:     Chief Complaint   Patient presents with    Hypertension    Foot Pain     left heel for last couple weeks. had hurt right knee and was limping now her left foot hurts. History of Present Illness:      Nito Contreras is a 55 y.o.  female who presents with Hypertension and Foot Pain (left heel for last couple weeks. had hurt right knee and was limping now her left foot hurts.  )    Landen Schafer is here today for her 6 month follow up for hypertension and depression. She states today she has been also having left heel pain that is worse in the AM.  She does state that she can hardly walk in the morning but will stretch it and sometimes feels better. She does do aerobics everyday and is active. She states she is barefoot when home. She has tried Ibuprofen for the symptoms. Hypertension has been stable and she did quit smoking in August.    Anxiety and depression-She states that she is still feeling good on the Zoloft and will continue with current dose. Quit smoking in August.      Past Medical History:     Past Medical History:   Diagnosis Date    GERD (gastroesophageal reflux disease)     Headache     Insomnia       Reviewed all health maintenance requirements and ordered appropriate tests  Health Maintenance Due   Topic Date Due    Cervical cancer screen  Never done    Flu vaccine (1) Never done       Past Surgical History:     No past surgical history on file. Medications:       Prior to Admission medications    Medication Sig Start Date End Date Taking?  Authorizing Provider   Ascorbic Acid (VITAMIN C PO) Take by mouth   Yes Historical Provider, MD   diclofenac sodium (VOLTAREN) 1 % GEL Apply 4 g topically 4 times daily 11/10/21 12/10/21 Yes CASTILLO Alvarez CNP   sertraline (ZOLOFT) 50 MG tablet Take 1 tablet by mouth daily 21  Yes CASTILLO Mcgee CNP   lisinopril (PRINIVIL;ZESTRIL) 10 MG tablet Take 1 tablet by mouth daily 5/6/21  Yes Charli Mathews, APRN - CNP   Cholecalciferol (VITAMIN D3) 50 MCG (2000 UT) CAPS Take by mouth daily   Yes Historical Provider, MD   vitamin B-12 (CYANOCOBALAMIN) 50 MCG tablet Take 50 mcg by mouth daily   Yes Historical Provider, MD   ibuprofen (ADVIL;MOTRIN) 800 MG tablet Take 800 mg by mouth every 6 hours as needed for Pain    Historical Provider, MD        Allergies:       Patient has no known allergies. Social History:     Tobacco:    reports that she has quit smoking. She has never used smokeless tobacco.  Alcohol:      reports no history of alcohol use. Drug Use:  reports no history of drug use. Family History:     Family History   Problem Relation Age of Onset    Breast Cancer Maternal Grandmother        Review of Systems:     Positive and Negative as described in HPI    Review of Systems   Constitutional: Negative. HENT: Negative. Eyes: Negative. Respiratory: Negative. Cardiovascular: Negative. Gastrointestinal: Negative. Endocrine: Negative. Genitourinary: Negative. Musculoskeletal:        Left heel pain   Skin: Negative. Allergic/Immunologic: Negative. Neurological: Negative. Hematological: Negative. Psychiatric/Behavioral: Negative. Physical Exam:   Vitals:  BP (!) 108/56 (Site: Left Upper Arm, Position: Sitting, Cuff Size: Large Adult)   Pulse 72   Temp 99 °F (37.2 °C)   Resp 18   Ht 5' 7.5\" (1.715 m)   Wt 198 lb 9.6 oz (90.1 kg)   LMP  (LMP Unknown)   BMI 30.65 kg/m²     Physical Exam  Vitals and nursing note reviewed. Constitutional:       Appearance: Normal appearance. HENT:      Head: Normocephalic. Right Ear: External ear normal.      Left Ear: External ear normal.      Nose: Nose normal.      Mouth/Throat:      Mouth: Mucous membranes are moist.   Eyes:      Conjunctiva/sclera: Conjunctivae normal.      Pupils: Pupils are equal, round, and reactive to light. Neck:      Vascular: No carotid bruit.    Cardiovascular: Rate and Rhythm: Normal rate and regular rhythm. Heart sounds: Normal heart sounds. Pulmonary:      Effort: Pulmonary effort is normal.      Breath sounds: Normal breath sounds. Musculoskeletal:         General: Normal range of motion. Right foot: Normal.      Left foot: Normal range of motion. Tenderness (over heel) present. No swelling or bony tenderness. Lymphadenopathy:      Cervical: No cervical adenopathy. Skin:     General: Skin is warm. Capillary Refill: Capillary refill takes less than 2 seconds. Neurological:      General: No focal deficit present. Mental Status: She is alert and oriented to person, place, and time. Psychiatric:         Mood and Affect: Mood normal.         Behavior: Behavior normal.         Data:     Lab Results   Component Value Date     11/25/2020    K 4.5 11/25/2020     11/25/2020    CO2 26 11/25/2020    BUN 21 11/25/2020    CREATININE 0.80 11/25/2020    GLUCOSE 84 11/25/2020    PROT 7.3 08/02/2019    LABALBU 4.6 08/02/2019    BILITOT 0.61 08/02/2019    ALKPHOS 56 08/02/2019    AST 17 11/25/2020    ALT 17 11/25/2020     Lab Results   Component Value Date    WBC 5.5 06/30/2021    RBC 4.51 06/30/2021    HGB 13.6 06/30/2021    HCT 41.5 06/30/2021    MCV 92.0 06/30/2021    MCH 30.2 06/30/2021    MCHC 32.8 06/30/2021    RDW 12.3 06/30/2021     06/30/2021    MPV 12.1 06/30/2021     Lab Results   Component Value Date    TSH 5.01 06/30/2021     Lab Results   Component Value Date    CHOL 172 11/25/2020    HDL 63 11/25/2020       Assessment/Plan:      Diagnosis Orders   1. Plantar fasciitis of left foot     2. Essential hypertension  Lipid Panel    CBC    Basic Metabolic Panel   3. Anxiety and depression     Plantar Fasciitits-Discussed at length with patient plantar fascitis.   Home remedies discussed include: do not go barefoot, NSAIDs and Voltaren gel prescribed, Support insoles in shoes, exercises and stretches, massage with softball, freezing water bottle to roll foot over at night to ice, examples of supportive shoes given such as Asics and Andrwes. Discussed wearing a boot at night to keep foot in flexion and seeing Podiatrist.  She would like to try some at home treatments at this time and will follow up with Podiatrist if no improvement. I did tape left foot and instructed patient on home taping for additional arch support. Hypertension-stable and continue taking Lisinopril 10 mg daily. Depression-stable and will continue taking Zoloft 50 mg daily. 1.  Jazzmine Cyril received counseling on the following healthy behaviors: nutrition, exercise and medication adherence  2. Patient given educational materials - see patient instructions  3. Was a self-tracking handout given in paper form or via [employer]? No  If yes, see orders or list here. 4.  Discussed use, benefit, and side effects of prescribed medications. Barriers to medication compliance addressed. All patient questions answered. Pt voiced understanding. 5.  Reviewed prior labs and health maintenance  6. Continue current medications, diet and exercise. Completed Refills   Requested Prescriptions     Signed Prescriptions Disp Refills    diclofenac sodium (VOLTAREN) 1 %  g 0     Sig: Apply 4 g topically 4 times daily         Return in about 6 months (around 5/10/2022).

## 2021-12-01 ENCOUNTER — OFFICE VISIT (OUTPATIENT)
Dept: PRIMARY CARE CLINIC | Age: 56
End: 2021-12-01
Payer: COMMERCIAL

## 2021-12-01 VITALS
BODY MASS INDEX: 31.77 KG/M2 | OXYGEN SATURATION: 98 % | HEIGHT: 67 IN | RESPIRATION RATE: 20 BRPM | HEART RATE: 89 BPM | WEIGHT: 202.4 LBS

## 2021-12-01 DIAGNOSIS — Z11.8 SCREENING FOR HEAD LICE: Primary | ICD-10-CM

## 2021-12-01 PROCEDURE — 99213 OFFICE O/P EST LOW 20 MIN: CPT | Performed by: NURSE PRACTITIONER

## 2021-12-01 PROCEDURE — G8417 CALC BMI ABV UP PARAM F/U: HCPCS | Performed by: NURSE PRACTITIONER

## 2021-12-01 PROCEDURE — G8484 FLU IMMUNIZE NO ADMIN: HCPCS | Performed by: NURSE PRACTITIONER

## 2021-12-01 PROCEDURE — 3017F COLORECTAL CA SCREEN DOC REV: CPT | Performed by: NURSE PRACTITIONER

## 2021-12-01 PROCEDURE — 1036F TOBACCO NON-USER: CPT | Performed by: NURSE PRACTITIONER

## 2021-12-01 PROCEDURE — G8427 DOCREV CUR MEDS BY ELIG CLIN: HCPCS | Performed by: NURSE PRACTITIONER

## 2021-12-01 ASSESSMENT — ENCOUNTER SYMPTOMS
EYES NEGATIVE: 1
GASTROINTESTINAL NEGATIVE: 1
RESPIRATORY NEGATIVE: 1
ALLERGIC/IMMUNOLOGIC NEGATIVE: 1

## 2021-12-01 NOTE — PATIENT INSTRUCTIONS
pharmacist if you have questions. · Do not shampoo or condition your hair before you use the medicine. It's best to wait 1 to 2 days after you use the medicine before washing your hair. · Check your scalp for live, active lice 12 hours after treatment. If you find some, talk to your doctor. You may need a different type of treatment. · Follow the directions carefully. Some medicines should only be used once. Others require a 2nd treatment 7 to 9 days after the first treatment. · Wet combing may help remove lice and nits. Use a comb with teeth that are close together. A flea comb that's made for dogs and cats will also work. Wet the hair. Comb all of the hair very carefully. Combing needs to be done over and over. · Try not to scratch. It may help to use an over-the-counter cream or calamine lotion to calm the itching. If the itching is really bad, ask the doctor about an over-the-counter antihistamine. Read and follow all instructions on the label. · Try not to share anything that comes into contact with hair. For example, don't share hair bands, barrettes, towels, hats, young, or brushes. Teach your children not to share anything that comes into contact with hair if they have lice. · You don't need to spend a lot of time or money deep cleaning your home. But it is a good idea to:  ? Soak hairbrushes, young, barrettes, and other items for 10 minutes in hot water (at least 130°F). ? Vacuum carpets, mattresses, couches, and other fabric-covered furniture. ? Machine-wash clothes, bedding, towels, and hats in hot water (at least 130°F). Dry them in a hot dryer. If you don't have access to a washing machine, instead you can store these items in a sealed plastic bag for 14 days. When should you call for help? Call your doctor now or seek immediate medical care if:    · You have signs of a skin infection, such as:  ? Increased pain, swelling, warmth, and redness.   ? Red streaks coming from an area of the scalp.  ? Pus draining from the area. ? A fever. Watch closely for changes in your health, and be sure to contact your doctor if:    · You see live lice or new nits after you have followed the directions for your medicine.     · Anyone else in your family has lice.     · You do not get better as expected. Where can you learn more? Go to [email or web address] org and sign in to your [employer] account. Enter R349 in the MyCoop box to learn more about \"Head Lice: Care Instructions. \"     If you do not have an account, please click on the \"Sign Up Now\" link. Current as of: March 3, 2021               Content Version: 13.0  © 2006-2021 Healthwise, Incorporated. Care instructions adapted under license by [facility] ([facility]). If you have questions about a medical condition or this instruction, always ask your healthcare professional. [place] [address] any warranty or liability for your use of this information.

## 2021-12-01 NOTE — PROGRESS NOTES
[facility]  [address] [id number] [facility]  Dept: [phone]  Dept Fax: [phone]    Kedar Hernandez is a 55 y.o. female who presents to the [facility] in Care today for her medical conditions/complaints as noted below. Kedar Hernandez is c/o of Other (head lice )      HPI:    Kedar Hernandez is a 55 y.o. female who presents with  Lice check. Her grandchildren have gotten lice 4 times now. She did treat her hair at home with the kids. She states she hasn't seen any lice but has been itching. Has had increased itching at back of her neck. Past Medical History:   Diagnosis Date    GERD (gastroesophageal reflux disease)     Headache     Insomnia         Current Outpatient Medications   Medication Sig Dispense Refill    Ascorbic Acid (VITAMIN C PO) Take by mouth      diclofenac sodium (VOLTAREN) 1 % GEL Apply 4 g topically 4 times daily 100 g 0    sertraline (ZOLOFT) 50 MG tablet Take 1 tablet by mouth daily 90 tablet 1    ibuprofen (ADVIL;MOTRIN) 800 MG tablet Take 800 mg by mouth every 6 hours as needed for Pain      lisinopril (PRINIVIL;ZESTRIL) 10 MG tablet Take 1 tablet by mouth daily 90 tablet 1    Cholecalciferol (VITAMIN D3) 50 MCG (2000 UT) CAPS Take by mouth daily      vitamin B-12 (CYANOCOBALAMIN) 50 MCG tablet Take 50 mcg by mouth daily       No current facility-administered medications for this visit. No Known Allergies    Subjective:      Review of Systems   Constitutional: Negative. HENT: Negative. Eyes: Negative. Respiratory: Negative. Cardiovascular: Negative. Gastrointestinal: Negative. Endocrine: Negative. Genitourinary: Negative. Musculoskeletal: Negative. Skin: Negative. Allergic/Immunologic: Negative. Neurological: Negative. Hematological: Negative. Psychiatric/Behavioral: Negative. Objective:     Physical Exam  Vitals and nursing note reviewed.    Constitutional: Appearance: Normal appearance. HENT:      Head: Normocephalic. Right Ear: External ear normal.      Left Ear: External ear normal.      Nose: Nose normal.      Mouth/Throat:      Mouth: Mucous membranes are moist.      Pharynx: Oropharynx is clear. Eyes:      Pupils: Pupils are equal, round, and reactive to light. Cardiovascular:      Rate and Rhythm: Normal rate and regular rhythm. Heart sounds: Normal heart sounds. Pulmonary:      Effort: Pulmonary effort is normal.      Breath sounds: Normal breath sounds. Musculoskeletal:         General: Normal range of motion. Skin:     General: Skin is warm. Capillary Refill: Capillary refill takes less than 2 seconds. Comments: No lie or nits seen on hair inspection. Small erythematous patch of dry skin noted on posterior scalp line. Neurological:      General: No focal deficit present. Mental Status: She is alert and oriented to person, place, and time. Psychiatric:         Mood and Affect: Mood normal.       Pulse 89   Resp 20   Ht 5' 7\" (1.702 m)   Wt 202 lb 6.4 oz (91.8 kg)   LMP  (LMP Unknown)   SpO2 98%   BMI 31.70 kg/m²     Assessment:      Diagnosis Orders   1. Screening for head lice       No results found for this visit on 12/01/21. Plan:     Continue to monitor for lice at home. Re treat hair with at home RID if nits found. Apply moisturizer such as aquaphor, Cerave, Lubriderm to dry skin at base of hair line. No follow-ups on file. No orders of the defined types were placed in this encounter.        Electronically signed by CASTILLO Cartwright CNP on 12/1/2021 at 1:39 PM

## 2022-01-26 DIAGNOSIS — I10 ESSENTIAL HYPERTENSION: ICD-10-CM

## 2022-01-26 RX ORDER — LISINOPRIL 10 MG/1
10 TABLET ORAL DAILY
Qty: 90 TABLET | Refills: 1 | Status: SHIPPED | OUTPATIENT
Start: 2022-01-26 | End: 2022-08-01

## 2022-01-26 NOTE — TELEPHONE ENCOUNTER
Health Maintenance   Topic Date Due    Cervical cancer screen  Never done    Flu vaccine (1) Never done    Potassium monitoring  11/25/2021    Creatinine monitoring  11/25/2021    COVID-19 Vaccine (1) 05/06/2022 (Originally 5/19/1970)    Hepatitis C screen  05/06/2022 (Originally 1965)    DTaP/Tdap/Td vaccine (1 - Tdap) 08/24/2022 (Originally 5/19/1985)    Shingles Vaccine (1 of 2) 08/24/2022 (Originally 5/19/2016)    HIV screen  08/24/2022 (Originally [birth year]/19/1981)    Depression Monitoring  05/06/2022    Colon Cancer Screen FIT/FOBT  05/07/2022    Breast cancer screen  06/30/2023    Lipid screen  11/25/2025    Hepatitis A vaccine  Aged Out    Hepatitis B vaccine  Aged Out    Hib vaccine  Aged Out    Meningococcal (ACWY) vaccine  Aged Out    Pneumococcal 0-64 years Vaccine  Aged Out             (applicable per patient's age: Cancer Screenings, Depression Screening, Fall Risk Screening, Immunizations)    LDL Cholesterol (mg/dL)   Date Value   11/25/2020 94     AST (U/L)   Date Value   11/25/2020 17     ALT (U/L)   Date Value   11/25/2020 17     BUN (mg/dL)   Date Value   11/25/2020 21 (H)      (goal A1C is < 7)   (goal LDL is <100) need 30-50% reduction from baseline     BP Readings from Last 3 Encounters:   11/10/21 (!) 108/56   08/24/21 124/77   08/16/21 124/67    (goal /80)      All Future Testing planned in [facility]:  Lab Frequency Next Occurrence   ALT Once 11/06/2021   AST Once 11/06/2021   CBC Auto Differential Once 10/04/2021   Lipid Panel Once 11/10/2021   CBC Once 12/21/5965   Basic Metabolic Panel Once 59/29/1379       Next Visit Date:  Future Appointments   Date Time Provider Jeramy Barney   5/11/2022 10:00 AM Jarek Lozano APRN - CNP [facility]            Patient Active Problem List:     Thrombocytopenia [facility])     Essential hypertension

## 2022-02-07 ENCOUNTER — TELEPHONE (OUTPATIENT)
Dept: PRIMARY CARE CLINIC | Age: 57
End: 2022-02-07

## 2022-02-08 ENCOUNTER — HOSPITAL ENCOUNTER (OUTPATIENT)
Age: 57
Discharge: HOME | End: 2022-02-08
Payer: COMMERCIAL

## 2022-02-08 DIAGNOSIS — I10 ESSENTIAL HYPERTENSION: ICD-10-CM

## 2022-02-08 LAB
ANION GAP SERPL CALCULATED.3IONS-SCNC: 11 MMOL/L (ref 9–17)
BUN BLDV-MCNC: 11 MG/DL (ref 6–20)
BUN/CREAT BLD: 16 (ref 9–20)
CALCIUM SERPL-MCNC: 9.7 MG/DL (ref 8.6–10.4)
CHLORIDE BLD-SCNC: 104 MMOL/L (ref 98–107)
CHOLESTEROL/HDL RATIO: 3.7
CHOLESTEROL: 203 MG/DL
CO2: 27 MMOL/L (ref 20–31)
CREAT SERPL-MCNC: 0.7 MG/DL (ref 0.5–0.9)
GFR AFRICAN AMERICAN: >60 ML/MIN
GFR NON-AFRICAN AMERICAN: >60 ML/MIN
GFR SERPL CREATININE-BSD FRML MDRD: NORMAL ML/MIN/{1.73_M2}
GFR SERPL CREATININE-BSD FRML MDRD: NORMAL ML/MIN/{1.73_M2}
GLUCOSE BLD-MCNC: 91 MG/DL (ref 70–99)
HCT VFR BLD CALC: 42.5 % (ref 36.3–47.1)
HDLC SERPL-MCNC: 55 MG/DL
HEMOGLOBIN: 13.5 G/DL (ref 11.9–15.1)
LDL CHOLESTEROL: 113 MG/DL (ref 0–130)
MCH RBC QN AUTO: 29 PG (ref 25.2–33.5)
MCHC RBC AUTO-ENTMCNC: 31.8 G/DL (ref 28.4–34.8)
MCV RBC AUTO: 91.4 FL (ref 82.6–102.9)
NRBC AUTOMATED: 0 PER 100 WBC
PDW BLD-RTO: 12 % (ref 11.8–14.4)
PLATELET # BLD: 156 K/UL (ref 138–453)
PMV BLD AUTO: 12 FL (ref 8.1–13.5)
POTASSIUM SERPL-SCNC: 5 MMOL/L (ref 3.7–5.3)
RBC # BLD: 4.65 M/UL (ref 3.95–5.11)
SODIUM BLD-SCNC: 142 MMOL/L (ref 135–144)
TRIGL SERPL-MCNC: 177 MG/DL
VLDLC SERPL CALC-MCNC: ABNORMAL MG/DL (ref 1–30)
WBC # BLD: 4.8 K/UL (ref 3.5–11.3)

## 2022-02-08 PROCEDURE — 36415 COLL VENOUS BLD VENIPUNCTURE: CPT

## 2022-02-08 PROCEDURE — 85027 COMPLETE CBC AUTOMATED: CPT

## 2022-02-08 PROCEDURE — 80061 LIPID PANEL: CPT

## 2022-02-08 PROCEDURE — 80048 BASIC METABOLIC PNL TOTAL CA: CPT

## 2022-02-09 ENCOUNTER — TELEPHONE (OUTPATIENT)
Dept: PRIMARY CARE CLINIC | Age: 57
End: 2022-02-09

## 2022-02-09 RX ORDER — FENOFIBRATE 48 MG/1
48 TABLET, COATED ORAL DAILY
Qty: 30 TABLET | Refills: 3 | Status: SHIPPED | OUTPATIENT
Start: 2022-02-09 | End: 2022-08-31 | Stop reason: SDUPTHER

## 2022-02-09 NOTE — PROGRESS NOTES
The 10-year ASCVD risk score (John Bridges, et al., 2013) is: 1.9%    Values used to calculate the score:      Age: 55 years      Sex: Female      Is Non- : No      Diabetic: No      Tobacco smoker: No      Systolic Blood Pressure: 048 mmHg      Is BP treated: Yes      HDL Cholesterol: 55 mg/dL      Total Cholesterol: 203 mg/dL    Start Fenofibrate for increased triglycerides.

## 2022-02-09 NOTE — TELEPHONE ENCOUNTER
----- Message from CASTILLO Fitch CNP sent at 2/9/2022  8:17 AM EST -----  Please notify patient of mostly normal lab results. Her triglycerides are slightly elevated. I'm going to send a prescription to pharmacy for Fenofibrate for her to try and continue exercise. Let her know this is not a statin medication.   Thanks Antonietta's

## 2022-02-10 ENCOUNTER — OFFICE VISIT (OUTPATIENT)
Dept: PRIMARY CARE CLINIC | Age: 57
End: 2022-02-10
Payer: COMMERCIAL

## 2022-02-10 VITALS
TEMPERATURE: 98.8 F | DIASTOLIC BLOOD PRESSURE: 82 MMHG | WEIGHT: 215.6 LBS | HEART RATE: 88 BPM | OXYGEN SATURATION: 100 % | RESPIRATION RATE: 18 BRPM | BODY MASS INDEX: 33.77 KG/M2 | SYSTOLIC BLOOD PRESSURE: 120 MMHG

## 2022-02-10 DIAGNOSIS — R63.5 WEIGHT GAIN: Primary | ICD-10-CM

## 2022-02-10 PROCEDURE — G8427 DOCREV CUR MEDS BY ELIG CLIN: HCPCS | Performed by: NURSE PRACTITIONER

## 2022-02-10 PROCEDURE — 99213 OFFICE O/P EST LOW 20 MIN: CPT | Performed by: NURSE PRACTITIONER

## 2022-02-10 PROCEDURE — G8417 CALC BMI ABV UP PARAM F/U: HCPCS | Performed by: NURSE PRACTITIONER

## 2022-02-10 PROCEDURE — 3017F COLORECTAL CA SCREEN DOC REV: CPT | Performed by: NURSE PRACTITIONER

## 2022-02-10 PROCEDURE — G8484 FLU IMMUNIZE NO ADMIN: HCPCS | Performed by: NURSE PRACTITIONER

## 2022-02-10 PROCEDURE — 1036F TOBACCO NON-USER: CPT | Performed by: NURSE PRACTITIONER

## 2022-02-10 ASSESSMENT — ENCOUNTER SYMPTOMS
ALLERGIC/IMMUNOLOGIC NEGATIVE: 1
RESPIRATORY NEGATIVE: 1
EYES NEGATIVE: 1
GASTROINTESTINAL NEGATIVE: 1

## 2022-02-10 NOTE — PATIENT INSTRUCTIONS
SURVEY:     You may be receiving a survey from [employer] regarding your visit today. Please complete the survey to enable us to provide the highest quality of care to you and your family. If you cannot score us a very good on any question, please call the office to discuss how we could have made your experience a very good one. Thank you.   Yennifer Silverman, APRN-SUNDAR Garrett, CNP  Stephanie Holloway, LPN  Chuy Garcia, LPN  Davina Leonard, CMA  Guerline Shanks, CMA  Jesenia, CMA  Otilia, PCA

## 2022-02-10 NOTE — PROGRESS NOTES
[facility]  Sammie Hood, [facility]  [address] [facility] [address] [place] [address]  Dept: [phone]  Dept Fax: [phone]      Name: Lizbeth Yoo  : 1965         Chief Complaint:     Chief Complaint   Patient presents with    Weight Gain     Pt c/o weight gain starting in October patient stopped smoking in August. Starting December 198 lbs Today 211 lbs. Stopped exercising Decemeber. History of Present Illness:      Lizbeth Yoo is a 55 y.o.  female who presents with Weight Gain (Pt c/o weight gain starting in October patient stopped smoking in August. Starting December 198 lbs Today 211 lbs. Stopped exercising Decemeber. )    Santos Sharpe is here today for weight gain. She has cut out sweets and chips. She has cut her food intake in half. She states in the morning she drinks decaf coffee and will just have a small bowl of cereal.  She denies an increase in her appetite. She states she has been doing this for a few weeks. She quit exercising in December due to being frustrated with weight gain. Past Medical History:     Past Medical History:   Diagnosis Date    GERD (gastroesophageal reflux disease)     Headache     Insomnia       Reviewed all health maintenance requirements and ordered appropriate tests  Health Maintenance Due   Topic Date Due    Cervical cancer screen  Never done    Low dose CT lung screening  Never done       Past Surgical History:     No past surgical history on file. Medications:       Prior to Admission medications    Medication Sig Start Date End Date Taking?  Authorizing Provider   fenofibrate (TRICOR) 48 MG tablet Take 1 tablet by mouth daily 22  Yes CASTILLO Peña CNP   lisinopril (PRINIVIL;ZESTRIL) 10 MG tablet Take 1 tablet by mouth daily 22  Yes CASTILLO Peña - CNP   Ascorbic Acid (VITAMIN C PO) Take by mouth   Yes Historical Provider, MD   sertraline (ZOLOFT) 50 MG tablet Take 1 tablet by mouth daily 9/22/21  Yes Mabel Austyn CASTILLO Silverman CNP   ibuprofen (ADVIL;MOTRIN) 800 MG tablet Take 800 mg by mouth every 6 hours as needed for Pain   Yes Historical Provider, MD   Cholecalciferol (VITAMIN D3) 50 MCG (2000 UT) CAPS Take by mouth daily   Yes Historical Provider, MD   vitamin B-12 (CYANOCOBALAMIN) 50 MCG tablet Take 50 mcg by mouth daily   Yes Historical Provider, MD   diclofenac sodium (VOLTAREN) 1 % GEL Apply 4 g topically 4 times daily 11/10/21 12/10/21  CASTILLO Sawyer CNP        Allergies:       Patient has no known allergies. Social History:     Tobacco:    reports that she quit smoking about 6 months ago. She has a 30.00 pack-year smoking history. She has never used smokeless tobacco.  Alcohol:      reports no history of alcohol use. Drug Use:  reports no history of drug use. Family History:     Family History   Problem Relation Age of Onset    Breast Cancer Maternal Grandmother        Review of Systems:     Positive and Negative as described in HPI    Review of Systems   Constitutional: Positive for unexpected weight change. HENT: Negative. Eyes: Negative. Respiratory: Negative. Cardiovascular: Negative. Gastrointestinal: Negative. Endocrine: Negative. Genitourinary: Negative. Musculoskeletal: Negative. Skin: Negative. Allergic/Immunologic: Negative. Neurological: Negative. Hematological: Negative. Psychiatric/Behavioral: Negative. Physical Exam:   Vitals:  /82   Pulse 88   Temp 98.8 °F (37.1 °C) (Temporal)   Resp 18   Wt 215 lb 9.6 oz (97.8 kg)   LMP  (LMP Unknown)   SpO2 100%   BMI 33.77 kg/m²     Physical Exam  Vitals and nursing note reviewed. Constitutional:       Appearance: Normal appearance. She is obese. HENT:      Head: Normocephalic.       Right Ear: External ear normal.      Left Ear: External ear normal.      Nose: Nose normal.      Mouth/Throat:      Mouth: Mucous membranes are moist.   Eyes: Conjunctiva/sclera: Conjunctivae normal.      Pupils: Pupils are equal, round, and reactive to light. Cardiovascular:      Rate and Rhythm: Normal rate and regular rhythm. Heart sounds: Normal heart sounds. Pulmonary:      Effort: Pulmonary effort is normal.      Breath sounds: Normal breath sounds. Musculoskeletal:         General: Normal range of motion. Skin:     General: Skin is warm. Capillary Refill: Capillary refill takes less than 2 seconds. Neurological:      General: No focal deficit present. Mental Status: She is alert and oriented to person, place, and time. Psychiatric:         Mood and Affect: Mood normal.         Data:     Lab Results   Component Value Date     02/08/2022    K 5.0 02/08/2022     02/08/2022    CO2 27 02/08/2022    BUN 11 02/08/2022    CREATININE 0.70 02/08/2022    GLUCOSE 91 02/08/2022    PROT 7.3 08/02/2019    LABALBU 4.6 08/02/2019    BILITOT 0.61 08/02/2019    ALKPHOS 56 08/02/2019    AST 17 11/25/2020    ALT 17 11/25/2020     Lab Results   Component Value Date    WBC 4.8 02/08/2022    RBC 4.65 02/08/2022    HGB 13.5 02/08/2022    HCT 42.5 02/08/2022    MCV 91.4 02/08/2022    MCH 29.0 02/08/2022    MCHC 31.8 02/08/2022    RDW 12.0 02/08/2022     02/08/2022    MPV 12.0 02/08/2022     Lab Results   Component Value Date    TSH 5.01 06/30/2021     Lab Results   Component Value Date    CHOL 203 02/08/2022    HDL 55 02/08/2022       Assessment/Plan:      Diagnosis Orders   1. Weight gain       Weight Gain-Discussed at length with Rashmi Carrasco the importance of cutting calories and physical activity. We discussed using apps on phone to manage caloric intake, weight watchers, Keto, and other diets to try.   We discussed increase in difficulty with losing weight as we age and being menopausal.  I did offer Bibi Ellis today and she would like to wait and get serious about diet and exercise first.    1.  Rashmi Carrasco received counseling on the following healthy behaviors: nutrition and exercise  2. Patient given educational materials - see patient instructions  3. Was a self-tracking handout given in paper form or via [employer]? No  If yes, see orders or list here. 4.  Discussed use, benefit, and side effects of prescribed medications. Barriers to medication compliance addressed. All patient questions answered. Pt voiced understanding. 5.  Reviewed prior labs and health maintenance  6. Continue current medications, diet and exercise. Completed Refills   Requested Prescriptions      No prescriptions requested or ordered in this encounter         No follow-ups on file.

## 2022-03-25 DIAGNOSIS — F32.A ANXIETY AND DEPRESSION: ICD-10-CM

## 2022-03-25 DIAGNOSIS — F41.9 ANXIETY AND DEPRESSION: ICD-10-CM

## 2022-03-28 NOTE — TELEPHONE ENCOUNTER
Health Maintenance   Topic Date Due    Cervical cancer screen  Never done    Low dose CT lung screening  Never done    COVID-19 Vaccine (1) 05/06/2022 (Originally 5/19/1971)    Hepatitis C screen  05/06/2022 (Originally 1965)    DTaP/Tdap/Td vaccine (1 - Tdap) 08/24/2022 (Originally 5/19/1985)    Shingles Vaccine (1 of 2) 08/24/2022 (Originally 5/19/2016)    HIV screen  08/24/2022 (Originally 5/19/1981)    Flu vaccine (1) 02/10/2023 (Originally 9/1/2021)    Depression Monitoring  05/06/2022    Colorectal Cancer Screen  05/07/2022    Potassium monitoring  02/08/2023    Creatinine monitoring  02/08/2023    Breast cancer screen  06/30/2023    Lipid screen  02/08/2027    Hepatitis A vaccine  Aged Out    Hepatitis B vaccine  Aged Out    Hib vaccine  Aged Out    Meningococcal (ACWY) vaccine  Aged Out    Pneumococcal 0-64 years Vaccine  Aged Out             (applicable per patient's age: Cancer Screenings, Depression Screening, Fall Risk Screening, Immunizations)    LDL Cholesterol (mg/dL)   Date Value   02/08/2022 113     AST (U/L)   Date Value   11/25/2020 17     ALT (U/L)   Date Value   11/25/2020 17     BUN (mg/dL)   Date Value   02/08/2022 11      (goal A1C is < 7)   (goal LDL is <100) need 30-50% reduction from baseline     BP Readings from Last 3 Encounters:   02/10/22 120/82   11/10/21 (!) 108/56   08/24/21 124/77    (goal /80)      All Future Testing planned in [facility]:  Lab Frequency Next Occurrence       Next Visit Date:  Future Appointments   Date Time Provider Jeramy Barney   5/11/2022 10:00 AM CASTILLO Veras - CNP [facility]            Patient Active Problem List:     Thrombocytopenia ([facility] [address]     Essential hypertension Patient answered NO to all of the above 3 questions...

## 2022-04-19 ENCOUNTER — OFFICE VISIT (OUTPATIENT)
Dept: PRIMARY CARE CLINIC | Age: 57
End: 2022-04-19
Payer: COMMERCIAL

## 2022-04-19 VITALS
DIASTOLIC BLOOD PRESSURE: 78 MMHG | TEMPERATURE: 98.8 F | WEIGHT: 223.6 LBS | OXYGEN SATURATION: 97 % | RESPIRATION RATE: 18 BRPM | HEART RATE: 85 BPM | BODY MASS INDEX: 35.02 KG/M2 | SYSTOLIC BLOOD PRESSURE: 124 MMHG

## 2022-04-19 DIAGNOSIS — R79.89 ELEVATED TSH: ICD-10-CM

## 2022-04-19 DIAGNOSIS — B35.3 TINEA PEDIS OF BOTH FEET: ICD-10-CM

## 2022-04-19 DIAGNOSIS — E66.9 OBESITY (BMI 35.0-39.9 WITHOUT COMORBIDITY): Primary | ICD-10-CM

## 2022-04-19 DIAGNOSIS — R63.5 WEIGHT GAIN: ICD-10-CM

## 2022-04-19 PROCEDURE — 3017F COLORECTAL CA SCREEN DOC REV: CPT | Performed by: NURSE PRACTITIONER

## 2022-04-19 PROCEDURE — G8417 CALC BMI ABV UP PARAM F/U: HCPCS | Performed by: NURSE PRACTITIONER

## 2022-04-19 PROCEDURE — 1036F TOBACCO NON-USER: CPT | Performed by: NURSE PRACTITIONER

## 2022-04-19 PROCEDURE — 99214 OFFICE O/P EST MOD 30 MIN: CPT | Performed by: NURSE PRACTITIONER

## 2022-04-19 PROCEDURE — G8427 DOCREV CUR MEDS BY ELIG CLIN: HCPCS | Performed by: NURSE PRACTITIONER

## 2022-04-19 RX ORDER — CLOTRIMAZOLE 1 %
1 CREAM (GRAM) TOPICAL 2 TIMES DAILY
Qty: 30 G | Refills: 0 | Status: SHIPPED | OUTPATIENT
Start: 2022-04-19 | End: 2022-05-17

## 2022-04-19 ASSESSMENT — PATIENT HEALTH QUESTIONNAIRE - PHQ9
2. FEELING DOWN, DEPRESSED OR HOPELESS: 0
7. TROUBLE CONCENTRATING ON THINGS, SUCH AS READING THE NEWSPAPER OR WATCHING TELEVISION: 0
SUM OF ALL RESPONSES TO PHQ QUESTIONS 1-9: 0
4. FEELING TIRED OR HAVING LITTLE ENERGY: 0
6. FEELING BAD ABOUT YOURSELF - OR THAT YOU ARE A FAILURE OR HAVE LET YOURSELF OR YOUR FAMILY DOWN: 0
10. IF YOU CHECKED OFF ANY PROBLEMS, HOW DIFFICULT HAVE THESE PROBLEMS MADE IT FOR YOU TO DO YOUR WORK, TAKE CARE OF THINGS AT HOME, OR GET ALONG WITH OTHER PEOPLE: 0
5. POOR APPETITE OR OVEREATING: 0
SUM OF ALL RESPONSES TO PHQ QUESTIONS 1-9: 0
SUM OF ALL RESPONSES TO PHQ9 QUESTIONS 1 & 2: 0
8. MOVING OR SPEAKING SO SLOWLY THAT OTHER PEOPLE COULD HAVE NOTICED. OR THE OPPOSITE, BEING SO FIGETY OR RESTLESS THAT YOU HAVE BEEN MOVING AROUND A LOT MORE THAN USUAL: 0
9. THOUGHTS THAT YOU WOULD BE BETTER OFF DEAD, OR OF HURTING YOURSELF: 0
SUM OF ALL RESPONSES TO PHQ QUESTIONS 1-9: 0
3. TROUBLE FALLING OR STAYING ASLEEP: 0
SUM OF ALL RESPONSES TO PHQ QUESTIONS 1-9: 0
1. LITTLE INTEREST OR PLEASURE IN DOING THINGS: 0

## 2022-04-19 NOTE — PROGRESS NOTES
[facility]  Kristi Fam, [address] [facility]  [address]  Dept: [phone]  Dept Fax: [phone]      Name: Afshan Bacon  : 1965         Chief Complaint:     Chief Complaint   Patient presents with    Discuss Medications     Discuss getting on Ozempic for weight loss.  Tinea Pedis     Patient would like medicaiton to help with Athlete's foot        History of Present Illness:      Afshan Bacon is a 55 y.o.  female who presents with Discuss Medications (Discuss getting on Ozempic for weight loss. ) and Tinea Pedis (Patient would like medicaiton to help with Athlete's foot )    Sriram Rhoades is here to discuss weight loss medications. She is requesting to trial Ozempic today. She states she eats very little and has increased her activity since her last visit and she continues to gain weight. She has had 13lb weight gain since last visit. She states she has had some friends try Ozempic and have been successful with losing weight. Sriram Rhoades also complains of athlete's foot between her great toe and first toe on bilateral feet. She states she has tried vinegar water at home and it is not working. Toes are red and itchy with dry cracking skin. Past Medical History:     Past Medical History:   Diagnosis Date    GERD (gastroesophageal reflux disease)     Headache     Insomnia       Reviewed all health maintenance requirements and ordered appropriate tests  Health Maintenance Due   Topic Date Due    Cervical cancer screen  Never done    Low dose CT lung screening  Never done    Depression Monitoring  2022    Colorectal Cancer Screen  2022       Past Surgical History:     No past surgical history on file. Medications:       Prior to Admission medications    Medication Sig Start Date End Date Taking? Authorizing Provider   clotrimazole (LOTRIMIN) 1 % cream Apply 1 inch topically 2 times daily for 28 days Apply topically 2 times daily. 4/19/22 5/17/22 Yes CASTILLO Mcmullen CNP   Semaglutide-Weight Management (WEGOVY) 0.25 MG/0.5ML SOAJ SC injection Inject 0.25 mg into the skin every 7 days for 4 doses 4/19/22 5/11/22 Yes CASTILLO Mcmullen CNP   sertraline (ZOLOFT) 50 MG tablet TAKE ONE TABLET BY MOUTH DAILY 3/28/22  Yes CASTILLO Mcmullen CNP   lisinopril (PRINIVIL;ZESTRIL) 10 MG tablet Take 1 tablet by mouth daily 1/26/22  Yes CASTILLO Mcmullen CNP   ibuprofen (ADVIL;MOTRIN) 800 MG tablet Take 800 mg by mouth every 6 hours as needed for Pain   Yes Historical Provider, MD   Cholecalciferol (VITAMIN D3) 50 MCG (2000 UT) CAPS Take by mouth daily   Yes Historical Provider, MD   vitamin B-12 (CYANOCOBALAMIN) 50 MCG tablet Take 50 mcg by mouth daily   Yes Historical Provider, MD   fenofibrate (TRICOR) 48 MG tablet Take 1 tablet by mouth daily  Patient not taking: Reported on 4/19/2022 2/9/22   CASTILLO Mcmullen CNP   Ascorbic Acid (VITAMIN C PO) Take by mouth  Patient not taking: Reported on 4/19/2022    Historical Provider, MD   diclofenac sodium (VOLTAREN) 1 % GEL Apply 4 g topically 4 times daily 11/10/21 12/10/21  CASTILLO Mcmullen CNP        Allergies:       Patient has no known allergies. Social History:     Tobacco:    reports that she quit smoking about 8 months ago. She has a 30.00 pack-year smoking history. She has never used smokeless tobacco.  Alcohol:      reports no history of alcohol use. Drug Use:  reports no history of drug use. Family History:     Family History   Problem Relation Age of Onset    Breast Cancer Maternal Grandmother        Review of Systems:     Positive and Negative as described in HPI    Review of Systems   Constitutional: Positive for fatigue. HENT: Negative. Eyes: Negative. Respiratory: Negative. Cardiovascular: Negative. Gastrointestinal: Negative. Endocrine: Negative. Genitourinary: Negative. Musculoskeletal: Negative. Skin: Negative. Allergic/Immunologic: Negative. Neurological: Negative. Hematological: Negative. Psychiatric/Behavioral: Negative. Physical Exam:   Vitals:  /78   Pulse 85   Temp 98.8 °F (37.1 °C) (Temporal)   Resp 18   Wt 223 lb 9.6 oz (101.4 kg)   LMP  (LMP Unknown)   SpO2 97%   BMI 35.02 kg/m²     Physical Exam  Vitals and nursing note reviewed. Constitutional:       General: She is not in acute distress. Appearance: Normal appearance. She is obese. HENT:      Head: Normocephalic. Right Ear: External ear normal.      Left Ear: External ear normal.      Nose: Nose normal.      Mouth/Throat:      Mouth: Mucous membranes are moist.      Pharynx: Oropharynx is clear. Eyes:      Conjunctiva/sclera: Conjunctivae normal.      Pupils: Pupils are equal, round, and reactive to light. Cardiovascular:      Rate and Rhythm: Normal rate and regular rhythm. Heart sounds: Normal heart sounds. Pulmonary:      Effort: Pulmonary effort is normal.      Breath sounds: Normal breath sounds. Musculoskeletal:         General: Normal range of motion. Cervical back: Normal range of motion. Skin:     General: Skin is warm. Capillary Refill: Capillary refill takes less than 2 seconds. Neurological:      General: No focal deficit present. Mental Status: She is alert and oriented to person, place, and time.    Psychiatric:         Mood and Affect: Mood normal.         Data:     Lab Results   Component Value Date     02/08/2022    K 5.0 02/08/2022     02/08/2022    CO2 27 02/08/2022    BUN 11 02/08/2022    CREATININE 0.70 02/08/2022    GLUCOSE 91 02/08/2022    PROT 7.3 08/02/2019    LABALBU 4.6 08/02/2019    BILITOT 0.61 08/02/2019    ALKPHOS 56 08/02/2019    AST 17 11/25/2020    ALT 17 11/25/2020     Lab Results   Component Value Date    WBC 4.8 02/08/2022    RBC 4.65 02/08/2022    HGB 13.5 02/08/2022    HCT 42.5 02/08/2022    MCV 91.4 02/08/2022    MCH 29.[facility]    RDW 12.0 02/08/2022     02/08/2022    MPV 12.0 02/08/2022     Lab Results   Component Value Date    TSH 5.01 06/30/2021     Lab Results   Component Value Date    CHOL 203 02/08/2022    HDL 55 02/08/2022       Assessment/Plan:      Diagnosis Orders   1. Obesity (BMI 35.0-39.9 without comorbidity)  Semaglutide-Weight Management (WEGOVY) 0.25 MG/0.5ML SOAJ SC injection   2. Weight gain  TSH With Reflex Ft4    T4, Free    T3    Semaglutide-Weight Management (WEGOVY) 0.25 MG/0.5ML SOAJ SC injection   3. Tinea pedis of both feet  clotrimazole (LOTRIMIN) 1 % cream   4. Elevated TSH  TSH With Reflex Ft4    T4, Free    T3     Obesity/Weight Gain-Wegovy 0.25 mg SQ weekly. Discussed side effects and administration of medication. Patient verbalizes understanding. Discussed diet with increased protein and decreased carbohydrates. Encourage activity and moderate exercise. Will re-evaluate TSH levels and labs ordered. Tinea Pedis-Clotrimazole as prescribed. Leave feet open to air as much as possible. 1.  Katelyn Zaheerjcaron received counseling on the following healthy behaviors: nutrition, exercise and medication adherence  2. Patient given educational materials - see patient instructions  3. Was a self-tracking handout given in paper form or via [employer]? No  If yes, see orders or list here. 4.  Discussed use, benefit, and side effects of prescribed medications. Barriers to medication compliance addressed. All patient questions answered. Pt voiced understanding. 5.  Reviewed prior labs and health maintenance  6. Continue current medications, diet and exercise. Completed Refills   Requested Prescriptions     Signed Prescriptions Disp Refills    clotrimazole (LOTRIMIN) 1 % cream 30 g 0     Sig: Apply 1 inch topically 2 times daily for 28 days Apply topically 2 times daily.     Semaglutide-Weight Management (WEGOVY) 0.25 MG/0.5ML SOAJ SC injection 2 mL 0     Sig: Inject 0.25 mg into the skin every 7 days for 4 doses         Return in about 1 month (around 5/19/2022).

## 2022-04-20 ENCOUNTER — TELEPHONE (OUTPATIENT)
Dept: PRIMARY CARE CLINIC | Age: 57
End: 2022-04-20

## 2022-04-20 DIAGNOSIS — E66.01 CLASS 2 SEVERE OBESITY DUE TO EXCESS CALORIES WITH SERIOUS COMORBIDITY AND BODY MASS INDEX (BMI) OF 35.0 TO 35.9 IN ADULT (HCC): Primary | ICD-10-CM

## 2022-04-20 RX ORDER — PHENTERMINE AND TOPIRAMATE 3.75; 23 MG/1; MG/1
3.75 CAPSULE, EXTENDED RELEASE ORAL DAILY
Qty: 14 CAPSULE | Refills: 0 | Status: SHIPPED
Start: 2022-04-20 | End: 2022-04-20

## 2022-04-20 RX ORDER — PHENTERMINE HYDROCHLORIDE 37.5 MG/1
37.5 TABLET ORAL
Qty: 30 TABLET | Refills: 0 | Status: SHIPPED | OUTPATIENT
Start: 2022-04-20 | End: 2022-05-19 | Stop reason: SDUPTHER

## 2022-04-20 NOTE — TELEPHONE ENCOUNTER
Patient called in and said the medication would be $200 so she would like adipex sent in to [facility] in [place] if possible.    Thanks

## 2022-04-20 NOTE — TELEPHONE ENCOUNTER
Let patient know I'm going to prescribe a medication called Qsymia and tell her to go to their website [employer] for information and coupons. It has more benefit than Adipex and maybe cheaper. If it is not affordable let me know and we can just do the Adipex. She does need a nurse visit for weight check in 1 month.

## 2022-04-20 NOTE — TELEPHONE ENCOUNTER
Patient called in and said the weight loss medication you prescribed she can not afford. Wanted to know if she could try adipex as an alternative?   Please advise thank you

## 2022-05-11 ENCOUNTER — OFFICE VISIT (OUTPATIENT)
Dept: PRIMARY CARE CLINIC | Age: 57
End: 2022-05-11
Payer: COMMERCIAL

## 2022-05-11 VITALS
OXYGEN SATURATION: 95 % | HEART RATE: 92 BPM | SYSTOLIC BLOOD PRESSURE: 124 MMHG | BODY MASS INDEX: 33.83 KG/M2 | WEIGHT: 216 LBS | RESPIRATION RATE: 18 BRPM | DIASTOLIC BLOOD PRESSURE: 82 MMHG | TEMPERATURE: 98.9 F

## 2022-05-11 DIAGNOSIS — F41.9 ANXIETY AND DEPRESSION: ICD-10-CM

## 2022-05-11 DIAGNOSIS — I10 ESSENTIAL HYPERTENSION: Primary | ICD-10-CM

## 2022-05-11 DIAGNOSIS — Z12.11 COLON CANCER SCREENING: ICD-10-CM

## 2022-05-11 DIAGNOSIS — F32.A ANXIETY AND DEPRESSION: ICD-10-CM

## 2022-05-11 DIAGNOSIS — Z12.31 OTHER SCREENING MAMMOGRAM: ICD-10-CM

## 2022-05-11 DIAGNOSIS — R63.5 WEIGHT GAIN: ICD-10-CM

## 2022-05-11 PROCEDURE — 99214 OFFICE O/P EST MOD 30 MIN: CPT | Performed by: NURSE PRACTITIONER

## 2022-05-11 PROCEDURE — 1036F TOBACCO NON-USER: CPT | Performed by: NURSE PRACTITIONER

## 2022-05-11 PROCEDURE — G8427 DOCREV CUR MEDS BY ELIG CLIN: HCPCS | Performed by: NURSE PRACTITIONER

## 2022-05-11 PROCEDURE — G8417 CALC BMI ABV UP PARAM F/U: HCPCS | Performed by: NURSE PRACTITIONER

## 2022-05-11 PROCEDURE — 3017F COLORECTAL CA SCREEN DOC REV: CPT | Performed by: NURSE PRACTITIONER

## 2022-05-11 ASSESSMENT — PATIENT HEALTH QUESTIONNAIRE - PHQ9
10. IF YOU CHECKED OFF ANY PROBLEMS, HOW DIFFICULT HAVE THESE PROBLEMS MADE IT FOR YOU TO DO YOUR WORK, TAKE CARE OF THINGS AT HOME, OR GET ALONG WITH OTHER PEOPLE: 0
SUM OF ALL RESPONSES TO PHQ QUESTIONS 1-9: 0
8. MOVING OR SPEAKING SO SLOWLY THAT OTHER PEOPLE COULD HAVE NOTICED. OR THE OPPOSITE, BEING SO FIGETY OR RESTLESS THAT YOU HAVE BEEN MOVING AROUND A LOT MORE THAN USUAL: 0
3. TROUBLE FALLING OR STAYING ASLEEP: 0
SUM OF ALL RESPONSES TO PHQ QUESTIONS 1-9: 0
SUM OF ALL RESPONSES TO PHQ9 QUESTIONS 1 & 2: 0
2. FEELING DOWN, DEPRESSED OR HOPELESS: 0
6. FEELING BAD ABOUT YOURSELF - OR THAT YOU ARE A FAILURE OR HAVE LET YOURSELF OR YOUR FAMILY DOWN: 0
9. THOUGHTS THAT YOU WOULD BE BETTER OFF DEAD, OR OF HURTING YOURSELF: 0
7. TROUBLE CONCENTRATING ON THINGS, SUCH AS READING THE NEWSPAPER OR WATCHING TELEVISION: 0
4. FEELING TIRED OR HAVING LITTLE ENERGY: 0
1. LITTLE INTEREST OR PLEASURE IN DOING THINGS: 0
5. POOR APPETITE OR OVEREATING: 0
SUM OF ALL RESPONSES TO PHQ QUESTIONS 1-9: 0
SUM OF ALL RESPONSES TO PHQ QUESTIONS 1-9: 0

## 2022-05-11 ASSESSMENT — ENCOUNTER SYMPTOMS
RESPIRATORY NEGATIVE: 1
GASTROINTESTINAL NEGATIVE: 1
EYES NEGATIVE: 1

## 2022-05-11 NOTE — PATIENT INSTRUCTIONS
SURVEY:     You may be receiving a survey from [employer] regarding your visit today. Please complete the survey to enable us to provide the highest quality of care to you and your family. If you cannot score us a very good on any question, please call the office to discuss how we could have made your experience a very good one. Thank you.   William Silverman, APRN-SUNDAR Castellanos, SUNDAR Goldman, CYNDI Patel, LITZY Salomon, LITZY Ba, CMA  Otilia, PCA

## 2022-05-11 NOTE — PROGRESS NOTES
[facility]  [facility], [address] [facility]  [address]  Dept: [phone]  Dept Fax: [phone]      Name: Dennis Ramirez  : 1965         Chief Complaint:     Chief Complaint   Patient presents with    Hypertension     6 month check. no concerns. History of Present Illness:      Dennis Ramirez is a 55 y.o.  female who presents with Hypertension (6 month check. no concerns. )    Anthony Tam is here today for 6 month follow up for hypertension and depression. Anthony Tam recently started  taking Adipex to lose weight. She is also working out at [employer] and watching what she eats. Anthony Tam states she is feeling good on Zoloft and does not need any medication adjustment. She has no other concerns at today's visit. Past Medical History:     Past Medical History:   Diagnosis Date    GERD (gastroesophageal reflux disease)     Headache     Insomnia       Reviewed all health maintenance requirements and ordered appropriate tests  Health Maintenance Due   Topic Date Due    Cervical cancer screen  Never done    Low dose CT lung screening  Never done    Colorectal Cancer Screen  2022       Past Surgical History:     No past surgical history on file. Medications:       Prior to Admission medications    Medication Sig Start Date End Date Taking? Authorizing Provider   phentermine (ADIPEX-P) 37.5 MG tablet Take 1 tablet by mouth every morning (before breakfast) for 30 days. 22 Yes CASTILLO Vines CNP   clotrimazole (LOTRIMIN) 1 % cream Apply 1 inch topically 2 times daily for 28 days Apply topically 2 times daily.  22 Yes CASTILLO Vines CNP   sertraline (ZOLOFT) 50 MG tablet TAKE ONE TABLET BY MOUTH DAILY 3/28/22  Yes CASTILLO Vines CNP   fenofibrate (TRICOR) 48 MG tablet Take 1 tablet by mouth daily 22  Yes CASTILLO Vines CNP   lisinopril (PRINIVIL;ZESTRIL) 10 MG tablet Take 1 tablet by mouth daily 1/26/22  Yes CASTILLO Pinto CNP   diclofenac sodium (VOLTAREN) 1 % GEL Apply 4 g topically 4 times daily 11/10/21 5/11/22 Yes CASTILLO Pinto CNP   ibuprofen (ADVIL;MOTRIN) 800 MG tablet Take 800 mg by mouth every 6 hours as needed for Pain   Yes Historical Provider, MD   Cholecalciferol (VITAMIN D3) 50 MCG (2000 UT) CAPS Take by mouth daily   Yes Historical Provider, MD   vitamin B-12 (CYANOCOBALAMIN) 50 MCG tablet Take 50 mcg by mouth daily   Yes Historical Provider, MD   Ascorbic Acid (VITAMIN C PO) Take by mouth  Patient not taking: Reported on 4/19/2022    Historical Provider, MD        Allergies:       Patient has no known allergies. Social History:     Tobacco:    reports that she quit smoking about 9 months ago. She has a 30.00 pack-year smoking history. She has never used smokeless tobacco.  Alcohol:      reports no history of alcohol use. Drug Use:  reports no history of drug use. Family History:     Family History   Problem Relation Age of Onset    Breast Cancer Maternal Grandmother        Review of Systems:     Positive and Negative as described in HPI    Review of Systems   Constitutional: Negative. HENT: Negative. Eyes: Negative. Respiratory: Negative. Cardiovascular: Negative. Gastrointestinal: Negative. Endocrine: Negative. Genitourinary: Negative. Musculoskeletal: Negative. Skin: Negative. Allergic/Immunologic: Positive for environmental allergies. Neurological: Negative. Hematological: Negative. Psychiatric/Behavioral: Positive for dysphoric mood. Physical Exam:   Vitals:  /82   Pulse 92   Temp 98.9 °F (37.2 °C) (Temporal)   Resp 18   Wt 216 lb (98 kg)   LMP  (LMP Unknown)   SpO2 95%   BMI 33.83 kg/m²     Physical Exam  Vitals and nursing note reviewed. Constitutional:       Appearance: Normal appearance. She is obese. HENT:      Head: Normocephalic.       Right Ear: External ear normal.      Left Ear: External ear normal.      Nose: Nose normal.      Mouth/Throat:      Mouth: Mucous membranes are moist.      Pharynx: Oropharynx is clear. Eyes:      Conjunctiva/sclera: Conjunctivae normal.      Pupils: Pupils are equal, round, and reactive to light. Cardiovascular:      Rate and Rhythm: Normal rate and regular rhythm. Heart sounds: Normal heart sounds. Pulmonary:      Effort: Pulmonary effort is normal.      Breath sounds: Normal breath sounds. Musculoskeletal:         General: Normal range of motion. Cervical back: Normal range of motion. Skin:     General: Skin is warm. Capillary Refill: Capillary refill takes less than 2 seconds. Neurological:      General: No focal deficit present. Mental Status: She is alert and oriented to person, place, and time. Psychiatric:         Mood and Affect: Mood normal.         Data:     Lab Results   Component Value Date     02/08/2022    K 5.0 02/08/2022     02/08/2022    CO2 27 02/08/2022    BUN 11 02/08/2022    CREATININE 0.70 02/08/2022    GLUCOSE 91 02/08/2022    PROT 7.3 08/02/2019    LABALBU 4.6 08/02/2019    BILITOT 0.61 08/02/2019    ALKPHOS 56 08/02/2019    AST 17 11/25/2020    ALT 17 11/25/2020     Lab Results   Component Value Date    WBC 4.8 02/08/2022    RBC 4.65 02/08/2022    HGB 13.5 02/08/2022    HCT 42.5 02/08/2022    MCV 91.4 02/08/2022    MCH 29.0 02/08/2022    MCHC 31.8 02/08/2022    RDW 12.0 02/08/2022     02/08/2022    MPV 12.0 02/08/2022     Lab Results   Component Value Date    TSH 5.01 06/30/2021     Lab Results   Component Value Date    CHOL 203 02/08/2022    HDL 55 02/08/2022       Assessment/Plan:      Diagnosis Orders   1. Essential hypertension     2. Anxiety and depression     3. Weight gain     4. Colon cancer screening  POCT Fecal Immunochemical Test (FIT)   5. Other screening mammogram  KENISHA YONNY DIGITAL SCREEN BILATERAL     Essential Hypertension-Stable. Continue Lisinopril 10 mg daily. Continue with lifestyle modifications. Anxiety and Depression-Stable. Continue Zoloft 50 mg daily. 1.  Kayleen Carroll received counseling on the following healthy behaviors: nutrition, exercise and medication adherence  2. Patient given educational materials - see patient instructions  3. Was a self-tracking handout given in paper form or via [employer]? No  If yes, see orders or list here. 4.  Discussed use, benefit, and side effects of prescribed medications. Barriers to medication compliance addressed. All patient questions answered. Pt voiced understanding. 5.  Reviewed prior labs and health maintenance  6. Continue current medications, diet and exercise. Completed Refills   Requested Prescriptions      No prescriptions requested or ordered in this encounter         Return in about 6 months (around 11/11/2022).

## 2022-05-13 ENCOUNTER — HOSPITAL ENCOUNTER (OUTPATIENT)
Age: 57
Discharge: HOME | End: 2022-05-13
Payer: COMMERCIAL

## 2022-05-13 DIAGNOSIS — R63.5 WEIGHT GAIN: ICD-10-CM

## 2022-05-13 DIAGNOSIS — R79.89 ELEVATED TSH: ICD-10-CM

## 2022-05-13 LAB
T3 TOTAL: 108 NG/DL (ref 60–181)
THYROXINE, FREE: 0.99 NG/DL (ref 0.93–1.7)
TSH SERPL DL<=0.05 MIU/L-ACNC: 7.72 UIU/ML (ref 0.3–5)

## 2022-05-13 PROCEDURE — 84443 ASSAY THYROID STIM HORMONE: CPT

## 2022-05-13 PROCEDURE — 84439 ASSAY OF FREE THYROXINE: CPT

## 2022-05-13 PROCEDURE — 84480 ASSAY TRIIODOTHYRONINE (T3): CPT

## 2022-05-13 PROCEDURE — 36415 COLL VENOUS BLD VENIPUNCTURE: CPT

## 2022-05-16 ENCOUNTER — TELEPHONE (OUTPATIENT)
Dept: PRIMARY CARE CLINIC | Age: 57
End: 2022-05-16

## 2022-05-16 DIAGNOSIS — E03.9 HYPOTHYROIDISM (ACQUIRED): Primary | ICD-10-CM

## 2022-05-16 RX ORDER — LEVOTHYROXINE SODIUM 0.03 MG/1
25 TABLET ORAL DAILY
Qty: 90 TABLET | Refills: 0 | Status: SHIPPED | OUTPATIENT
Start: 2022-05-16 | End: 2022-08-08

## 2022-05-16 NOTE — TELEPHONE ENCOUNTER
Contacted patient in regards to lab results and the need to start new medication. Patient verbalized understanding.

## 2022-05-16 NOTE — TELEPHONE ENCOUNTER
----- Message from CASTILLO Perez CNP sent at 5/16/2022  2:28 PM EDT -----  Please notify Katelyn Rosario that her TSH was elevated and I'm sending medication in for her. Its Synthroid 25 mcg. Instruct her it is very important that she take first thing in the morning on an empty stomach and wait 30-60 minutes before eating. She will need to repeat labs in 6 weeks.   Thanks Delaney Chapin

## 2022-05-19 ENCOUNTER — OFFICE VISIT (OUTPATIENT)
Dept: PRIMARY CARE CLINIC | Age: 57
End: 2022-05-19

## 2022-05-19 ENCOUNTER — TELEPHONE (OUTPATIENT)
Dept: PRIMARY CARE CLINIC | Age: 57
End: 2022-05-19

## 2022-05-19 VITALS
TEMPERATURE: 97.8 F | WEIGHT: 213.8 LBS | DIASTOLIC BLOOD PRESSURE: 72 MMHG | HEART RATE: 97 BPM | RESPIRATION RATE: 18 BRPM | SYSTOLIC BLOOD PRESSURE: 118 MMHG | BODY MASS INDEX: 33.49 KG/M2 | OXYGEN SATURATION: 98 %

## 2022-05-19 DIAGNOSIS — Z12.11 COLON CANCER SCREENING: ICD-10-CM

## 2022-05-19 DIAGNOSIS — E66.01 CLASS 2 SEVERE OBESITY DUE TO EXCESS CALORIES WITH SERIOUS COMORBIDITY AND BODY MASS INDEX (BMI) OF 35.0 TO 35.9 IN ADULT (HCC): ICD-10-CM

## 2022-05-19 LAB
CONTROL: PRESENT
HEMOCCULT STL QL: NEGATIVE

## 2022-05-19 PROCEDURE — 99999 PR OFFICE/OUTPT VISIT,PROCEDURE ONLY: CPT | Performed by: NURSE PRACTITIONER

## 2022-05-19 PROCEDURE — 82274 ASSAY TEST FOR BLOOD FECAL: CPT | Performed by: NURSE PRACTITIONER

## 2022-05-19 RX ORDER — PHENTERMINE HYDROCHLORIDE 37.5 MG/1
37.5 TABLET ORAL
Qty: 30 TABLET | Refills: 0 | Status: CANCELLED | OUTPATIENT
Start: 2022-05-19 | End: 2022-06-18

## 2022-05-19 RX ORDER — PHENTERMINE HYDROCHLORIDE 37.5 MG/1
37.5 TABLET ORAL
Qty: 30 TABLET | Refills: 0 | Status: SHIPPED | OUTPATIENT
Start: 2022-05-19 | End: 2022-06-16 | Stop reason: SDUPTHER

## 2022-05-19 NOTE — TELEPHONE ENCOUNTER
----- Message from CASTILLO Wilson - CNP sent at 5/19/2022 11:12 AM EDT -----  Please notify patient of normal FIT test results.   Thanks Annelise

## 2022-06-16 ENCOUNTER — NURSE ONLY (OUTPATIENT)
Dept: PRIMARY CARE CLINIC | Age: 57
End: 2022-06-16

## 2022-06-16 VITALS
OXYGEN SATURATION: 98 % | RESPIRATION RATE: 20 BRPM | TEMPERATURE: 97.8 F | DIASTOLIC BLOOD PRESSURE: 70 MMHG | SYSTOLIC BLOOD PRESSURE: 122 MMHG | WEIGHT: 203.9 LBS | BODY MASS INDEX: 31.94 KG/M2 | HEART RATE: 78 BPM

## 2022-06-16 DIAGNOSIS — E66.01 CLASS 2 SEVERE OBESITY DUE TO EXCESS CALORIES WITH SERIOUS COMORBIDITY AND BODY MASS INDEX (BMI) OF 35.0 TO 35.9 IN ADULT (HCC): ICD-10-CM

## 2022-06-16 RX ORDER — PHENTERMINE HYDROCHLORIDE 37.5 MG/1
37.5 TABLET ORAL
Qty: 30 TABLET | Refills: 0 | Status: SHIPPED | OUTPATIENT
Start: 2022-06-16 | End: 2022-07-16

## 2022-06-16 ASSESSMENT — PATIENT HEALTH QUESTIONNAIRE - PHQ9
SUM OF ALL RESPONSES TO PHQ QUESTIONS 1-9: 0
10. IF YOU CHECKED OFF ANY PROBLEMS, HOW DIFFICULT HAVE THESE PROBLEMS MADE IT FOR YOU TO DO YOUR WORK, TAKE CARE OF THINGS AT HOME, OR GET ALONG WITH OTHER PEOPLE: 0
6. FEELING BAD ABOUT YOURSELF - OR THAT YOU ARE A FAILURE OR HAVE LET YOURSELF OR YOUR FAMILY DOWN: 0
SUM OF ALL RESPONSES TO PHQ9 QUESTIONS 1 & 2: 0
SUM OF ALL RESPONSES TO PHQ QUESTIONS 1-9: 0
8. MOVING OR SPEAKING SO SLOWLY THAT OTHER PEOPLE COULD HAVE NOTICED. OR THE OPPOSITE, BEING SO FIGETY OR RESTLESS THAT YOU HAVE BEEN MOVING AROUND A LOT MORE THAN USUAL: 0
4. FEELING TIRED OR HAVING LITTLE ENERGY: 0
SUM OF ALL RESPONSES TO PHQ QUESTIONS 1-9: 0
9. THOUGHTS THAT YOU WOULD BE BETTER OFF DEAD, OR OF HURTING YOURSELF: 0
3. TROUBLE FALLING OR STAYING ASLEEP: 0
7. TROUBLE CONCENTRATING ON THINGS, SUCH AS READING THE NEWSPAPER OR WATCHING TELEVISION: 0
1. LITTLE INTEREST OR PLEASURE IN DOING THINGS: 0
2. FEELING DOWN, DEPRESSED OR HOPELESS: 0
5. POOR APPETITE OR OVEREATING: 0
SUM OF ALL RESPONSES TO PHQ QUESTIONS 1-9: 0

## 2022-06-16 NOTE — PATIENT INSTRUCTIONS
SURVEY:     You may be receiving a survey from [employer] regarding your visit today. Please complete the survey to enable us to provide the highest quality of care to you and your family. If you cannot score us a very good on any question, please call the office to discuss how we could have made your experience a very good one. Thank you.   Diana Silverman, APRN-SUNDAR Weinberg, CNP  Cuba Car, LPN  Lorenzo Young, CMA  Gabino, CMA  Jesenia, CMA  Otilia, PCA  Kiersten, PM

## 2022-07-01 ENCOUNTER — HOSPITAL ENCOUNTER (OUTPATIENT)
Dept: WOMENS IMAGING | Age: 57
Discharge: HOME | End: 2022-07-03
Payer: COMMERCIAL

## 2022-07-01 DIAGNOSIS — Z12.31 OTHER SCREENING MAMMOGRAM: ICD-10-CM

## 2022-07-01 PROCEDURE — 77063 BREAST TOMOSYNTHESIS BI: CPT

## 2022-07-05 ENCOUNTER — TELEPHONE (OUTPATIENT)
Dept: PRIMARY CARE CLINIC | Age: 57
End: 2022-07-05

## 2022-07-05 NOTE — TELEPHONE ENCOUNTER
----- Message from [address] [place] [address], CASTILLO - CNP sent at 7/1/2022  4:19 PM EDT -----  Results are normal, please call patient and make them aware.

## 2022-07-31 DIAGNOSIS — I10 ESSENTIAL HYPERTENSION: ICD-10-CM

## 2022-08-01 RX ORDER — LISINOPRIL 10 MG/1
TABLET ORAL
Qty: 90 TABLET | Refills: 1 | Status: SHIPPED | OUTPATIENT
Start: 2022-08-01

## 2022-08-01 NOTE — TELEPHONE ENCOUNTER
Health Maintenance   Topic Date Due    DTaP/Tdap/Td vaccine (1 - Tdap) 08/24/2022 (Originally 5/19/1985)    Shingles vaccine (1 of 2) 08/24/2022 (Originally 5/19/2016)    HIV screen  08/24/2022 (Originally 5/19/1981)    COVID-19 Vaccine (1) 05/11/2023 (Originally 1965)    Hepatitis C screen  05/11/2023 (Originally 5/19/1984)    Cervical cancer screen  06/16/2023 (Originally 5/19/1987)    Flu vaccine (1) 09/01/2022    Colorectal Cancer Screen  05/19/2023    Depression Monitoring  06/16/2023    Breast cancer screen  07/01/2024    Lipids  02/08/2027    Hepatitis A vaccine  Aged Out    Hepatitis B vaccine  Aged Out    Hib vaccine  Aged Out    Meningococcal (ACWY) vaccine  Aged Out    Pneumococcal 0-64 years Vaccine  Aged Out    Low dose CT lung screening  Discontinued             (applicable per patient's age: Cancer Screenings, Depression Screening, Fall Risk Screening, Immunizations)    LDL Cholesterol (mg/dL)   Date Value   02/08/2022 113     AST (U/L)   Date Value   11/25/2020 17     ALT (U/L)   Date Value   11/25/2020 17     BUN (mg/dL)   Date Value   02/08/2022 11      (goal A1C is < 7)   (goal LDL is <100) need 30-50% reduction from baseline     BP Readings from Last 3 Encounters:   06/16/22 122/70   05/19/22 118/72   05/11/22 124/82    (goal /80)      All Future Testing planned in [facility]:  Lab Frequency Next Occurrence   TSH With Reflex Ft4 Once 07/08/2022       Next Visit Date:  Future Appointments   Date Time Provider Jeramy Barney   11/14/2022  2:30 PM CASTILLO Alvarez - CNP [facility]            Patient Active Problem List:     Thrombocytopenia [facility])     Essential hypertension

## 2022-08-01 NOTE — TELEPHONE ENCOUNTER
Health Maintenance   Topic Date Due    DTaP/Tdap/Td vaccine (1 - Tdap) 08/24/2022 (Originally 5/19/1985)    Shingles vaccine (1 of 2) 08/24/2022 (Originally 5/19/2016)    HIV screen  08/24/2022 (Originally 5/19/1981)    COVID-19 Vaccine (1) 05/11/2023 (Originally 1965)    Hepatitis C screen  05/11/2023 (Originally 5/19/1984)    Cervical cancer screen  06/16/2023 (Originally 5/19/1987)    Flu vaccine (1) 09/01/2022    Colorectal Cancer Screen  05/19/2023    Depression Monitoring  06/16/2023    Breast cancer screen  07/01/2024    Lipids  02/08/2027    Hepatitis A vaccine  Aged Out    Hepatitis B vaccine  Aged Out    Hib vaccine  Aged Out    Meningococcal (ACWY) vaccine  Aged Out    Pneumococcal 0-64 years Vaccine  Aged Out    Low dose CT lung screening  Discontinued             (applicable per patient's age: Cancer Screenings, Depression Screening, Fall Risk Screening, Immunizations)    LDL Cholesterol (mg/dL)   Date Value   02/08/2022 113     AST (U/L)   Date Value   11/25/2020 17     ALT (U/L)   Date Value   11/25/2020 17     BUN (mg/dL)   Date Value   02/08/2022 11      (goal A1C is < 7)   (goal LDL is <100) need 30-50% reduction from baseline     BP Readings from Last 3 Encounters:   06/16/22 122/70   05/19/22 118/72   05/11/22 124/82    (goal /80)      All Future Testing planned in [facility]:  Lab Frequency Next Occurrence   TSH With Reflex Ft4 Once 07/08/2022       Next Visit Date:  Future Appointments   Date Time Provider Jeramy Barney   11/14/2022  2:30 PM Nikolay Chiang, APRN - CNP [facility]            Patient Active Problem List:     Thrombocytopenia [facility])     Essential hypertension

## 2022-08-08 DIAGNOSIS — E03.9 HYPOTHYROIDISM (ACQUIRED): ICD-10-CM

## 2022-08-08 RX ORDER — LEVOTHYROXINE SODIUM 0.03 MG/1
25 TABLET ORAL DAILY
Qty: 90 TABLET | Refills: 1 | Status: SHIPPED | OUTPATIENT
Start: 2022-08-08 | End: 2022-11-06

## 2022-08-19 ENCOUNTER — OFFICE VISIT (OUTPATIENT)
Dept: PRIMARY CARE CLINIC | Age: 57
End: 2022-08-19
Payer: COMMERCIAL

## 2022-08-19 VITALS
HEART RATE: 74 BPM | DIASTOLIC BLOOD PRESSURE: 78 MMHG | OXYGEN SATURATION: 98 % | WEIGHT: 197.7 LBS | TEMPERATURE: 97.7 F | SYSTOLIC BLOOD PRESSURE: 118 MMHG | BODY MASS INDEX: 30.96 KG/M2

## 2022-08-19 DIAGNOSIS — H81.13 BENIGN PAROXYSMAL POSITIONAL VERTIGO DUE TO BILATERAL VESTIBULAR DISORDER: Primary | ICD-10-CM

## 2022-08-19 PROCEDURE — 99213 OFFICE O/P EST LOW 20 MIN: CPT | Performed by: NURSE PRACTITIONER

## 2022-08-19 PROCEDURE — G8417 CALC BMI ABV UP PARAM F/U: HCPCS | Performed by: NURSE PRACTITIONER

## 2022-08-19 PROCEDURE — G8427 DOCREV CUR MEDS BY ELIG CLIN: HCPCS | Performed by: NURSE PRACTITIONER

## 2022-08-19 PROCEDURE — 3017F COLORECTAL CA SCREEN DOC REV: CPT | Performed by: NURSE PRACTITIONER

## 2022-08-19 PROCEDURE — 1036F TOBACCO NON-USER: CPT | Performed by: NURSE PRACTITIONER

## 2022-08-19 RX ORDER — MECLIZINE HYDROCHLORIDE 25 MG/1
25 TABLET ORAL 3 TIMES DAILY PRN
Qty: 30 TABLET | Refills: 0 | Status: SHIPPED | OUTPATIENT
Start: 2022-08-19 | End: 2022-08-29

## 2022-08-19 ASSESSMENT — ENCOUNTER SYMPTOMS
ABDOMINAL PAIN: 0
DIARRHEA: 0
NAUSEA: 1
RHINORRHEA: 0
VOMITING: 0
COUGH: 0
WHEEZING: 0
SORE THROAT: 0
SHORTNESS OF BREATH: 0
CONSTIPATION: 0

## 2022-08-19 ASSESSMENT — PATIENT HEALTH QUESTIONNAIRE - PHQ9
SUM OF ALL RESPONSES TO PHQ9 QUESTIONS 1 & 2: 0
5. POOR APPETITE OR OVEREATING: 0
2. FEELING DOWN, DEPRESSED OR HOPELESS: 0
3. TROUBLE FALLING OR STAYING ASLEEP: 0
6. FEELING BAD ABOUT YOURSELF - OR THAT YOU ARE A FAILURE OR HAVE LET YOURSELF OR YOUR FAMILY DOWN: 0
1. LITTLE INTEREST OR PLEASURE IN DOING THINGS: 0
SUM OF ALL RESPONSES TO PHQ QUESTIONS 1-9: 0
10. IF YOU CHECKED OFF ANY PROBLEMS, HOW DIFFICULT HAVE THESE PROBLEMS MADE IT FOR YOU TO DO YOUR WORK, TAKE CARE OF THINGS AT HOME, OR GET ALONG WITH OTHER PEOPLE: 0
4. FEELING TIRED OR HAVING LITTLE ENERGY: 0
7. TROUBLE CONCENTRATING ON THINGS, SUCH AS READING THE NEWSPAPER OR WATCHING TELEVISION: 0
SUM OF ALL RESPONSES TO PHQ QUESTIONS 1-9: 0
9. THOUGHTS THAT YOU WOULD BE BETTER OFF DEAD, OR OF HURTING YOURSELF: 0
SUM OF ALL RESPONSES TO PHQ QUESTIONS 1-9: 0
SUM OF ALL RESPONSES TO PHQ QUESTIONS 1-9: 0
8. MOVING OR SPEAKING SO SLOWLY THAT OTHER PEOPLE COULD HAVE NOTICED. OR THE OPPOSITE, BEING SO FIGETY OR RESTLESS THAT YOU HAVE BEEN MOVING AROUND A LOT MORE THAN USUAL: 0

## 2022-08-19 NOTE — PROGRESS NOTES
Name: Sherry Kehr  : 1965         Chief Complaint:     Chief Complaint   Patient presents with    Dizziness     X 3 days, with nausea. History of Present Illness:      Sherry Kehr is a 65 y.o.  female who presents with Dizziness (X 3 days, with nausea.)      Sintia Nichole is here today for a routine office visit. Dizziness  This is a new problem. The current episode started in the past 7 days. The problem occurs intermittently. The problem has been waxing and waning. Associated symptoms include nausea. Pertinent negatives include no abdominal pain, chest pain, chills, congestion, coughing, fatigue, fever, headaches, neck pain, rash, sore throat or vomiting. The symptoms are aggravated by bending and standing. She has tried rest for the symptoms. The treatment provided mild relief. Past Medical History:     Past Medical History:   Diagnosis Date    GERD (gastroesophageal reflux disease)     Headache     Insomnia       Reviewed all health maintenance requirements and ordered appropriate tests  There are no preventive care reminders to display for this patient. Past Surgical History:     History reviewed. No pertinent surgical history. Medications:       Prior to Admission medications    Medication Sig Start Date End Date Taking? Authorizing Provider   meclizine (ANTIVERT) 25 MG tablet Take 1 tablet by mouth 3 times daily as needed for Dizziness or Nausea 22 Yes Andrew Silverman APRN - CNP   levothyroxine (SYNTHROID) 25 MCG tablet Take 1 tablet by mouth in the morning.  22 Yes Kevin Downy, APRN - CNP   lisinopril (PRINIVIL;ZESTRIL) 10 MG tablet TAKE ONE TABLET BY MOUTH DAILY 22  Yes Kevin Downy, APRN - CNP   sertraline (ZOLOFT) 50 MG tablet TAKE ONE TABLET BY MOUTH DAILY 3/28/22  Yes Kevin Downy, APRN - CNP   fenofibrate (TRICOR) 48 MG tablet Take 1 tablet by mouth daily 22  Yes Kevin Downy, APRN - CNP   diclofenac sodium (VOLTAREN) 1 % GEL Apply 4 g topically 4 times daily 11/10/21 8/19/22 Yes Romayne Sunnyarturblaire, APRN - CNP   ibuprofen (ADVIL;MOTRIN) 800 MG tablet Take 800 mg by mouth every 6 hours as needed for Pain   Yes Historical Provider, MD   Cholecalciferol (VITAMIN D3) 50 MCG (2000 UT) CAPS Take by mouth daily   Yes Historical Provider, MD   vitamin B-12 (CYANOCOBALAMIN) 50 MCG tablet Take 50 mcg by mouth daily   Yes Historical Provider, MD   Ascorbic Acid (VITAMIN C PO) Take by mouth  Patient not taking: No sig reported    Historical Provider, MD        Allergies:       Patient has no known allergies. Social History:     Tobacco:    reports that she quit smoking about 12 months ago. Her smoking use included cigarettes. She has a 30.00 pack-year smoking history. She has never used smokeless tobacco.  Alcohol:      reports no history of alcohol use. Drug Use:  reports no history of drug use. Family History:     Family History   Problem Relation Age of Onset    Breast Cancer Maternal Grandmother        Review of Systems:     Positive and Negative as described in HPI    Review of Systems   Constitutional:  Negative for chills, fatigue and fever. HENT:  Negative for congestion, rhinorrhea and sore throat. Eyes:  Negative for visual disturbance. Respiratory:  Negative for cough, shortness of breath and wheezing. Cardiovascular:  Negative for chest pain and palpitations. Gastrointestinal:  Positive for nausea. Negative for abdominal pain, constipation, diarrhea and vomiting. Genitourinary:  Negative for difficulty urinating and dysuria. Musculoskeletal:  Negative for gait problem, neck pain and neck stiffness. Skin:  Negative for rash. Neurological:  Positive for dizziness and light-headedness. Negative for syncope and headaches.      Physical Exam:   Vitals:  /78 (Position: Standing)   Pulse 74   Temp 97.7 °F (36.5 °C) (Temporal)   Wt 197 lb 11.2 oz (89.7 kg)   LMP  (LMP Unknown)   SpO2 98%   BMI 30.96 kg/m²     Physical Exam  Constitutional:       General: She is not in acute distress. Appearance: Normal appearance. She is not ill-appearing. HENT:      Right Ear: Tympanic membrane normal.      Left Ear: Tympanic membrane normal.      Mouth/Throat:      Mouth: Mucous membranes are moist.   Eyes:      General: No scleral icterus. Extraocular Movements: Extraocular movements intact. Conjunctiva/sclera: Conjunctivae normal.      Pupils: Pupils are equal, round, and reactive to light. Neck:      Vascular: No carotid bruit. Cardiovascular:      Rate and Rhythm: Normal rate and regular rhythm. Pulmonary:      Effort: Pulmonary effort is normal.      Breath sounds: Normal breath sounds. Skin:     General: Skin is warm and dry. Findings: No rash. Neurological:      Mental Status: She is alert and oriented to person, place, and time. Cranial Nerves: No cranial nerve deficit.    Psychiatric:         Mood and Affect: Mood normal.         Behavior: Behavior normal.       Data:     Lab Results   Component Value Date/Time     02/08/2022 11:08 AM    K 5.0 02/08/2022 11:08 AM     02/08/2022 11:08 AM    CO2 27 02/08/2022 11:08 AM    BUN 11 02/08/2022 11:08 AM    CREATININE 0.70 02/08/2022 11:08 AM    GLUCOSE 91 02/08/2022 11:08 AM    PROT 7.3 08/02/2019 11:45 AM    LABALBU 4.6 08/02/2019 11:45 AM    BILITOT 0.61 08/02/2019 11:45 AM    ALKPHOS 56 08/02/2019 11:45 AM    AST 17 11/25/2020 10:49 AM    ALT 17 11/25/2020 10:49 AM     Lab Results   Component Value Date/Time    WBC 4.8 02/08/2022 11:08 AM    RBC 4.65 02/08/2022 11:08 AM    HGB 13.5 02/08/2022 11:08 AM    HCT 42.5 02/08/2022 11:08 AM    MCV 91.4 02/08/2022 11:08 AM    MCH 29.0 02/08/2022 11:08 AM    MCHC 31.8 02/08/2022 11:08 AM    RDW 12.0 02/08/2022 11:08 AM     02/08/2022 11:08 AM    MPV 12.0 02/08/2022 11:08 AM     Lab Results   Component Value Date/Time    TSH 7.72 05/13/2022 11:27 AM     Lab Results   Component Value Date/Time CHOL 203 02/08/2022 11:08 AM    HDL 55 02/08/2022 11:08 AM       Assessment/Plan:      Diagnosis Orders   1. Benign paroxysmal positional vertigo due to bilateral vestibular disorder  meclizine (ANTIVERT) 25 MG tablet        Meclizine as needed. Rest, increase fluids. Call Monday or Tuesday with progress. If no improvement recommend physical therapy. 1.  Brenda Crew received counseling on the following healthy behaviors: medication adherence  2. Patient given educational materials - see patient instructions  3. Was a self-tracking handout given in paper form or via [employer]? No  If yes, see orders or list here. 4.  Discussed use, benefit, and side effects of prescribed medications. Barriers to medication compliance addressed. All patient questions answered. Pt voiced understanding. 5.  Reviewed prior labs and health maintenance  6. Continue current medications, diet and exercise. Completed Refills   Requested Prescriptions     Signed Prescriptions Disp Refills    meclizine (ANTIVERT) 25 MG tablet 30 tablet 0     Sig: Take 1 tablet by mouth 3 times daily as needed for Dizziness or Nausea         Return if symptoms worsen or fail to improve.

## 2022-08-19 NOTE — PATIENT INSTRUCTIONS
SURVEY:     You may be receiving a survey from [employer] regarding your visit today. Please complete the survey to enable us to provide the highest quality of care to you and your family. If you cannot score us a very good on any question, please call the office to discuss how we could have made your experience a very good one.      Thank you,    Galileo Silverman, APRN-SUNDAR Mulligan Do, APRN-CNP  Yahaira Muir, INDIAN  Law Borges, LITZY Schneider, LITZY Ba, CMA  Otilia, PCA  Kiersten, PM

## 2022-08-30 NOTE — TELEPHONE ENCOUNTER
Health Maintenance   Topic Date Due    HIV screen  Never done    DTaP/Tdap/Td vaccine (1 - Tdap) Never done    Shingles vaccine (1 of 2) Never done    COVID-19 Vaccine (1) 05/11/2023 (Originally 1965)    Hepatitis C screen  05/11/2023 (Originally 5/19/1984)    Cervical cancer screen  06/16/2023 (Originally 5/19/1987)    Flu vaccine (1) 09/01/2022    Colorectal Cancer Screen  05/19/2023    Depression Monitoring  08/19/2023    Breast cancer screen  07/01/2024    Lipids  02/08/2027    Hepatitis A vaccine  Aged Out    Hepatitis B vaccine  Aged Out    Hib vaccine  Aged Out    Meningococcal (ACWY) vaccine  Aged Out    Pneumococcal 0-64 years Vaccine  Aged Out    Low dose CT lung screening  Discontinued             (applicable per patient's age: Cancer Screenings, Depression Screening, Fall Risk Screening, Immunizations)    LDL Cholesterol (mg/dL)   Date Value   02/08/2022 113     AST (U/L)   Date Value   11/25/2020 17     ALT (U/L)   Date Value   11/25/2020 17     BUN (mg/dL)   Date Value   02/08/2022 11      (goal A1C is < 7)   (goal LDL is <100) need 30-50% reduction from baseline     BP Readings from Last 3 Encounters:   08/19/22 118/78   06/16/22 122/70   05/19/22 118/72    (goal /80)      All Future Testing planned in [facility]:  Lab Frequency Next Occurrence   TSH With Reflex Ft4 Once 07/08/2022       Next Visit Date:  Future Appointments   Date Time Provider Jeramy Barney   11/14/2022  2:30 PM Grecia Abdi APRN - CNP [facility]            Patient Active Problem List:     Thrombocytopenia [facility])     Essential hypertension

## 2022-08-31 RX ORDER — FENOFIBRATE 48 MG/1
48 TABLET, COATED ORAL DAILY
Qty: 90 TABLET | Refills: 1 | Status: SHIPPED | OUTPATIENT
Start: 2022-08-31

## 2022-10-13 DIAGNOSIS — F32.A ANXIETY AND DEPRESSION: ICD-10-CM

## 2022-10-13 DIAGNOSIS — F41.9 ANXIETY AND DEPRESSION: ICD-10-CM

## 2022-10-13 NOTE — TELEPHONE ENCOUNTER
Health Maintenance   Topic Date Due    HIV screen  Never done    DTaP/Tdap/Td vaccine (1 - Tdap) Never done    Shingles vaccine (1 of 2) Never done    Flu vaccine (1) Never done    COVID-19 Vaccine (1) 05/11/2023 (Originally 1965)    Hepatitis C screen  05/11/2023 (Originally 5/19/1984)    Cervical cancer screen  06/16/2023 (Originally 5/19/1987)    Colorectal Cancer Screen  05/19/2023    Depression Monitoring  08/19/2023    Breast cancer screen  07/01/2024    Lipids  02/08/2027    Hepatitis A vaccine  Aged Out    Hib vaccine  Aged Out    Meningococcal (ACWY) vaccine  Aged Out    Pneumococcal 0-64 years Vaccine  Aged Out    Low dose CT lung screening  Discontinued             (applicable per patient's age: Cancer Screenings, Depression Screening, Fall Risk Screening, Immunizations)    LDL Cholesterol (mg/dL)   Date Value   02/08/2022 113     AST (U/L)   Date Value   11/25/2020 17     ALT (U/L)   Date Value   11/25/2020 17     BUN (mg/dL)   Date Value   02/08/2022 11      (goal A1C is < 7)   (goal LDL is <100) need 30-50% reduction from baseline     BP Readings from Last 3 Encounters:   08/19/22 118/78   06/16/22 122/70   05/19/22 118/72    (goal /80)      All Future Testing planned in [facility]:  Lab Frequency Next Occurrence   TSH With Reflex Ft4 Once 07/08/2022       Next Visit Date:  Future Appointments   Date Time Provider Jeramy Barney   11/14/2022  2:30 PM CASTILLO Mart - CNP [facility]            Patient Active Problem List:     Thrombocytopenia [facility])     Essential hypertension

## 2022-11-14 ENCOUNTER — OFFICE VISIT (OUTPATIENT)
Dept: PRIMARY CARE CLINIC | Age: 57
End: 2022-11-14
Payer: COMMERCIAL

## 2022-11-14 ENCOUNTER — TELEPHONE (OUTPATIENT)
Dept: PRIMARY CARE CLINIC | Age: 57
End: 2022-11-14

## 2022-11-14 ENCOUNTER — HOSPITAL ENCOUNTER (OUTPATIENT)
Age: 57
Discharge: HOME | End: 2022-11-14
Payer: COMMERCIAL

## 2022-11-14 VITALS
TEMPERATURE: 99 F | HEART RATE: 75 BPM | SYSTOLIC BLOOD PRESSURE: 118 MMHG | DIASTOLIC BLOOD PRESSURE: 80 MMHG | WEIGHT: 214.8 LBS | RESPIRATION RATE: 18 BRPM | BODY MASS INDEX: 33.64 KG/M2 | OXYGEN SATURATION: 99 %

## 2022-11-14 DIAGNOSIS — F41.9 ANXIETY AND DEPRESSION: ICD-10-CM

## 2022-11-14 DIAGNOSIS — E03.9 HYPOTHYROIDISM (ACQUIRED): ICD-10-CM

## 2022-11-14 DIAGNOSIS — Z23 NEED FOR SHINGLES VACCINE: ICD-10-CM

## 2022-11-14 DIAGNOSIS — I10 ESSENTIAL HYPERTENSION: ICD-10-CM

## 2022-11-14 DIAGNOSIS — E03.9 HYPOTHYROIDISM, UNSPECIFIED TYPE: Primary | ICD-10-CM

## 2022-11-14 DIAGNOSIS — F32.A ANXIETY AND DEPRESSION: ICD-10-CM

## 2022-11-14 DIAGNOSIS — Z23 NEED FOR TETANUS BOOSTER: ICD-10-CM

## 2022-11-14 LAB — TSH SERPL DL<=0.05 MIU/L-ACNC: 7.23 UIU/ML (ref 0.3–5)

## 2022-11-14 PROCEDURE — 3078F DIAST BP <80 MM HG: CPT | Performed by: NURSE PRACTITIONER

## 2022-11-14 PROCEDURE — 1036F TOBACCO NON-USER: CPT | Performed by: NURSE PRACTITIONER

## 2022-11-14 PROCEDURE — 84443 ASSAY THYROID STIM HORMONE: CPT

## 2022-11-14 PROCEDURE — 3017F COLORECTAL CA SCREEN DOC REV: CPT | Performed by: NURSE PRACTITIONER

## 2022-11-14 PROCEDURE — G8427 DOCREV CUR MEDS BY ELIG CLIN: HCPCS | Performed by: NURSE PRACTITIONER

## 2022-11-14 PROCEDURE — 99214 OFFICE O/P EST MOD 30 MIN: CPT | Performed by: NURSE PRACTITIONER

## 2022-11-14 PROCEDURE — 90715 TDAP VACCINE 7 YRS/> IM: CPT | Performed by: NURSE PRACTITIONER

## 2022-11-14 PROCEDURE — 3074F SYST BP LT 130 MM HG: CPT | Performed by: NURSE PRACTITIONER

## 2022-11-14 PROCEDURE — G8484 FLU IMMUNIZE NO ADMIN: HCPCS | Performed by: NURSE PRACTITIONER

## 2022-11-14 PROCEDURE — G8417 CALC BMI ABV UP PARAM F/U: HCPCS | Performed by: NURSE PRACTITIONER

## 2022-11-14 PROCEDURE — 36415 COLL VENOUS BLD VENIPUNCTURE: CPT

## 2022-11-14 PROCEDURE — 84439 ASSAY OF FREE THYROXINE: CPT

## 2022-11-14 RX ORDER — LEVOTHYROXINE SODIUM 0.07 MG/1
75 TABLET ORAL DAILY
Qty: 90 TABLET | Refills: 0 | Status: SHIPPED | OUTPATIENT
Start: 2022-11-14 | End: 2023-02-12

## 2022-11-14 RX ORDER — ZOSTER VACCINE RECOMBINANT, ADJUVANTED 50 MCG/0.5
0.5 KIT INTRAMUSCULAR SEE ADMIN INSTRUCTIONS
Qty: 0.5 ML | Refills: 0 | Status: SHIPPED | OUTPATIENT
Start: 2022-11-14 | End: 2023-05-13

## 2022-11-14 RX ORDER — ZOSTER VACCINE RECOMBINANT, ADJUVANTED 50 MCG/0.5
0.5 KIT INTRAMUSCULAR SEE ADMIN INSTRUCTIONS
Qty: 0.5 ML | Refills: 0 | Status: SHIPPED
Start: 2022-11-14 | End: 2022-11-14 | Stop reason: CLARIF

## 2022-11-14 SDOH — ECONOMIC STABILITY: FOOD INSECURITY: WITHIN THE PAST 12 MONTHS, YOU WORRIED THAT YOUR FOOD WOULD RUN OUT BEFORE YOU GOT MONEY TO BUY MORE.: NEVER TRUE

## 2022-11-14 SDOH — ECONOMIC STABILITY: FOOD INSECURITY: WITHIN THE PAST 12 MONTHS, THE FOOD YOU BOUGHT JUST DIDN'T LAST AND YOU DIDN'T HAVE MONEY TO GET MORE.: NEVER TRUE

## 2022-11-14 ASSESSMENT — PATIENT HEALTH QUESTIONNAIRE - PHQ9
1. LITTLE INTEREST OR PLEASURE IN DOING THINGS: 0
5. POOR APPETITE OR OVEREATING: 0
9. THOUGHTS THAT YOU WOULD BE BETTER OFF DEAD, OR OF HURTING YOURSELF: 0
3. TROUBLE FALLING OR STAYING ASLEEP: 0
SUM OF ALL RESPONSES TO PHQ9 QUESTIONS 1 & 2: 0
SUM OF ALL RESPONSES TO PHQ QUESTIONS 1-9: 0
6. FEELING BAD ABOUT YOURSELF - OR THAT YOU ARE A FAILURE OR HAVE LET YOURSELF OR YOUR FAMILY DOWN: 0
4. FEELING TIRED OR HAVING LITTLE ENERGY: 0
SUM OF ALL RESPONSES TO PHQ QUESTIONS 1-9: 0
SUM OF ALL RESPONSES TO PHQ QUESTIONS 1-9: 0
2. FEELING DOWN, DEPRESSED OR HOPELESS: 0
SUM OF ALL RESPONSES TO PHQ QUESTIONS 1-9: 0
8. MOVING OR SPEAKING SO SLOWLY THAT OTHER PEOPLE COULD HAVE NOTICED. OR THE OPPOSITE, BEING SO FIGETY OR RESTLESS THAT YOU HAVE BEEN MOVING AROUND A LOT MORE THAN USUAL: 0
7. TROUBLE CONCENTRATING ON THINGS, SUCH AS READING THE NEWSPAPER OR WATCHING TELEVISION: 0
10. IF YOU CHECKED OFF ANY PROBLEMS, HOW DIFFICULT HAVE THESE PROBLEMS MADE IT FOR YOU TO DO YOUR WORK, TAKE CARE OF THINGS AT HOME, OR GET ALONG WITH OTHER PEOPLE: 0

## 2022-11-14 ASSESSMENT — ENCOUNTER SYMPTOMS
GASTROINTESTINAL NEGATIVE: 1
RESPIRATORY NEGATIVE: 1
EYES NEGATIVE: 1

## 2022-11-14 ASSESSMENT — SOCIAL DETERMINANTS OF HEALTH (SDOH): HOW HARD IS IT FOR YOU TO PAY FOR THE VERY BASICS LIKE FOOD, HOUSING, MEDICAL CARE, AND HEATING?: NOT HARD AT ALL

## 2022-11-14 NOTE — TELEPHONE ENCOUNTER
----- Message from CASTILLO Murcia CNP sent at 11/14/2022  2:48 PM EST -----  Please notify patient of elevated TSH results. I sent a new prescription to pharmacy for 75 mcg. Make sure to reeducate it needs taken first think in morning with no other medication and on empty stomach. I want her to repeat TSH level in 3 months.   Thanks Antonietta's

## 2022-11-14 NOTE — PATIENT INSTRUCTIONS
SURVEY:     You may be receiving a survey from [employer] regarding your visit today. Please complete the survey to enable us to provide the highest quality of care to you and your family. If you cannot score us a very good on any question, please call the office to discuss how we could have made your experience a very good one.      Thank you,    Cynthia Silverman, APRN-CNP  Rubi Schwarz, APRN-CNP  Ale Paniagua, LPN  Shira Clifton, CMA  Abel Cables, CMA  Jesenia, CMA  Otilia, PCA  Kiersten, PM

## 2022-11-14 NOTE — PROGRESS NOTES
[facility]  Karen Plata, [facility]  [address]  Dept: [phone]  Dept Fax: [phone]      Name: Yina Fernandez  : 1965         Chief Complaint:     Chief Complaint   Patient presents with    Hypertension     6 month check. History of Present Illness:      Yina Fernandez is a 65 y.o.  female who presents with Hypertension (6 month check. )    Gladys Meneses is here today for a routine office visit. She is doing well today. She is doing well with the Zoloft and states she feels well controlled. She states she is upset that she is gaining weight after stopping her Adipex. Gladys Meneses has not had her repeat TSH level redrawn after starting on Synthroid. She does state that she feels like her skin is dry, she is fatigued and continues to have difficulty gaining weight. She states she is always hot. Past Medical History:     Past Medical History:   Diagnosis Date    GERD (gastroesophageal reflux disease)     Headache     Insomnia       Reviewed all health maintenance requirements and ordered appropriate tests  Health Maintenance Due   Topic Date Due    Shingles vaccine (1 of 2) Never done       Past Surgical History:     No past surgical history on file. Medications:       Prior to Admission medications    Medication Sig Start Date End Date Taking? Authorizing Provider   zoster recombinant adjuvanted vaccine [facility]) 50 MCG/0.5ML SUSR injection Inject 0.5 mLs into the muscle See Admin Instructions 1 dose now and repeat in 2-6 months 22 Yes CASTILLO Medrano CNP   sertraline (ZOLOFT) 50 MG tablet TAKE ONE TABLET BY MOUTH DAILY 10/13/22  Yes CASTILLO Medrano CNP   fenofibrate (TRICOR) 48 MG tablet Take 1 tablet by mouth daily 22  Yes CASTILLO Espinoza CNP   levothyroxine (SYNTHROID) 25 MCG tablet Take 1 tablet by mouth in the morning.  22 Yes CASTILLO Medrano CNP   lisinopril (PRINIVIL;ZESTRIL) 10 MG tablet TAKE ONE TABLET BY MOUTH DAILY 8/1/22  Yes CASTILLO Olmedo CNP   ibuprofen (ADVIL;MOTRIN) 800 MG tablet Take 800 mg by mouth every 6 hours as needed for Pain   Yes Historical Provider, MD   Cholecalciferol (VITAMIN D3) 50 MCG (2000 UT) CAPS Take by mouth daily   Yes Historical Provider, MD   vitamin B-12 (CYANOCOBALAMIN) 50 MCG tablet Take 50 mcg by mouth daily   Yes Historical Provider, MD   Ascorbic Acid (VITAMIN C PO) Take by mouth  Patient not taking: No sig reported    Historical Provider, MD   diclofenac sodium (VOLTAREN) 1 % GEL Apply 4 g topically 4 times daily  Patient not taking: Reported on 11/14/2022 11/10/21 8/19/22  CASTILLO Olmedo CNP        Allergies:       Patient has no known allergies. Social History:     Tobacco:    reports that she quit smoking about 15 months ago. Her smoking use included cigarettes. She has a 30.00 pack-year smoking history. She has never used smokeless tobacco.  Alcohol:      reports no history of alcohol use. Drug Use:  reports no history of drug use. Family History:     Family History   Problem Relation Age of Onset    Breast Cancer Maternal Grandmother        Review of Systems:     Positive and Negative as described in HPI    Review of Systems   Constitutional:  Positive for fatigue. HENT: Negative. Eyes: Negative. Respiratory: Negative. Cardiovascular: Negative. Negative for chest pain, palpitations and leg swelling. Gastrointestinal: Negative. Endocrine: Negative. Genitourinary: Negative. Musculoskeletal: Negative. Skin: Negative. Allergic/Immunologic: Positive for environmental allergies. Neurological: Negative. Hematological: Negative. Psychiatric/Behavioral: Negative.        Physical Exam:   Vitals:  /80   Pulse 75   Temp 99 °F (37.2 °C) (Temporal)   Resp 18   Wt 214 lb 12.8 oz (97.4 kg)   LMP  (LMP Unknown)   SpO2 99%   BMI 33.64 kg/m²     Physical Exam  Vitals and nursing note reviewed. Constitutional:       General: She is not in acute distress. Appearance: Normal appearance. She is obese. HENT:      Head: Normocephalic. Right Ear: External ear normal.      Left Ear: External ear normal.      Nose: Nose normal.      Mouth/Throat:      Mouth: Mucous membranes are moist.      Pharynx: Oropharynx is clear. Eyes:      Conjunctiva/sclera: Conjunctivae normal.      Pupils: Pupils are equal, round, and reactive to light. Cardiovascular:      Rate and Rhythm: Normal rate and regular rhythm. Heart sounds: Normal heart sounds. No murmur heard. Pulmonary:      Effort: Pulmonary effort is normal.      Breath sounds: Normal breath sounds. Musculoskeletal:         General: Normal range of motion. Cervical back: Normal range of motion and neck supple. No tenderness. Lymphadenopathy:      Cervical: No cervical adenopathy. Skin:     General: Skin is warm. Capillary Refill: Capillary refill takes less than 2 seconds. Neurological:      General: No focal deficit present. Mental Status: She is alert and oriented to person, place, and time. Psychiatric:         Mood and Affect: Mood normal.         Behavior: Behavior normal.         Thought Content:  Thought content normal.         Judgment: Judgment normal.       Data:     Lab Results   Component Value Date/Time     02/08/2022 11:08 AM    K 5.0 02/08/2022 11:08 AM     02/08/2022 11:08 AM    CO2 27 02/08/2022 11:08 AM    BUN 11 02/08/2022 11:08 AM    CREATININE 0.70 02/08/2022 11:08 AM    GLUCOSE 91 02/08/2022 11:08 AM    PROT 7.3 08/02/2019 11:45 AM    LABALBU 4.6 08/02/2019 11:45 AM    BILITOT 0.61 08/02/2019 11:45 AM    ALKPHOS 56 08/02/2019 11:45 AM    AST 17 11/25/2020 10:49 AM    ALT 17 11/25/2020 10:49 AM     Lab Results   Component Value Date/Time    WBC 4.8 02/08/2022 11:08 AM    RBC 4.65 02/08/2022 11:08 AM    HGB 13.5 02/08/2022 11:08 AM    HCT 42.5 02/08/2022 11:08 AM MCV 91.4 02/08/2022 11:08 AM    MCH 29.0 02/08/2022 11:08 AM    MCHC 31.8 02/08/2022 11:08 AM    RDW 12.0 02/08/2022 11:08 AM     02/08/2022 11:08 AM    MPV 12.0 02/08/2022 11:08 AM     Lab Results   Component Value Date/Time    TSH 7.72 05/13/2022 11:27 AM     Lab Results   Component Value Date/Time    CHOL 203 02/08/2022 11:08 AM    HDL 55 02/08/2022 11:08 AM       Assessment/Plan:      Diagnosis Orders   1. Essential hypertension        2. Hypothyroidism, unspecified type        3. Anxiety and depression        4. Need for tetanus booster  Tetanus-Diphth-Acell Pertussis (BOOSTRIX) injection 0.5 mL      5. Need for shingles vaccine  zoster recombinant adjuvanted vaccine [facility]) 50 MCG/0.5ML SUSR injection    DISCONTINUED: zoster recombinant adjuvanted vaccine [facility]) 50 MCG/0.5ML SUSR injection        Get TSH level drawn and will adjust medication accordingly. Continue with Lisinopril and Zoloft with no changes in dosages. Discussed possible dietary referral for weight loss and refused at this time. 1.  Faiza Bacon received counseling on the following healthy behaviors: nutrition, exercise, and medication adherence  2. Patient given educational materials - see patient instructions  3. Was a self-tracking handout given in paper form or via [employer]? No  If yes, see orders or list here. 4.  Discussed use, benefit, and side effects of prescribed medications. Barriers to medication compliance addressed. All patient questions answered. Pt voiced understanding. 5.  Reviewed prior labs and health maintenance  6. Continue current medications, diet and exercise. Completed Refills   Requested Prescriptions     Signed Prescriptions Disp Refills    zoster recombinant adjuvanted vaccine (SHINGRIX) 50 MCG/0.5ML SUSR injection 0.5 mL 0     Sig: Inject 0.5 mLs into the muscle See Admin Instructions 1 dose now and repeat in 2-6 months         Return in about 3 months (around 2/14/2023).

## 2022-11-15 LAB — THYROXINE, FREE: 1.1 NG/DL (ref 0.93–1.7)

## 2023-02-06 DIAGNOSIS — I10 ESSENTIAL HYPERTENSION: ICD-10-CM

## 2023-02-06 RX ORDER — LISINOPRIL 10 MG/1
TABLET ORAL
Qty: 90 TABLET | Refills: 1 | Status: SHIPPED | OUTPATIENT
Start: 2023-02-06

## 2023-02-06 NOTE — TELEPHONE ENCOUNTER
Health Maintenance   Topic Date Due    Shingles vaccine (1 of 2) Never done    COVID-19 Vaccine (1) 05/11/2023 (Originally 1965)    Hepatitis C screen  05/11/2023 (Originally 5/19/1984)    Cervical cancer screen  06/16/2023 (Originally 5/19/1987)    Flu vaccine (1) 11/14/2023 (Originally 8/1/2022)    HIV screen  11/14/2023 (Originally 5/19/1981)    Colorectal Cancer Screen  05/19/2023    Depression Monitoring  11/14/2023    Breast cancer screen  07/01/2024    Lipids  02/08/2027    DTaP/Tdap/Td vaccine (2 - Td or Tdap) 11/14/2032    Hepatitis A vaccine  Aged Out    Hib vaccine  Aged Out    Meningococcal (ACWY) vaccine  Aged Out    Pneumococcal 0-64 years Vaccine  Aged Out    Low dose CT lung screening  Discontinued             (applicable per patient's age: Cancer Screenings, Depression Screening, Fall Risk Screening, Immunizations)    LDL Cholesterol (mg/dL)   Date Value   02/08/2022 113     AST (U/L)   Date Value   11/25/2020 17     ALT (U/L)   Date Value   11/25/2020 17     BUN (mg/dL)   Date Value   02/08/2022 11      (goal A1C is < 7)   (goal LDL is <100) need 30-50% reduction from baseline     BP Readings from Last 3 Encounters:   11/14/22 118/80   08/19/22 118/78   06/16/22 122/70    (goal /80)      All Future Testing planned in [facility]:  Lab Frequency Next Occurrence   TSH With Reflex Ft4 Once 02/14/2023       Next Visit Date:  Future Appointments   Date Time Provider Jeramy Barney   2/14/2023 11:00 AM CASTILLO Vargas - CNP [facility]            Patient Active Problem List:     Thrombocytopenia [facility])     Essential hypertension

## 2023-02-14 ENCOUNTER — OFFICE VISIT (OUTPATIENT)
Dept: PRIMARY CARE CLINIC | Age: 58
End: 2023-02-14
Payer: COMMERCIAL

## 2023-02-14 VITALS
SYSTOLIC BLOOD PRESSURE: 122 MMHG | WEIGHT: 216.6 LBS | OXYGEN SATURATION: 97 % | BODY MASS INDEX: 32.83 KG/M2 | HEIGHT: 68 IN | DIASTOLIC BLOOD PRESSURE: 84 MMHG | TEMPERATURE: 99.4 F | HEART RATE: 67 BPM | RESPIRATION RATE: 18 BRPM

## 2023-02-14 DIAGNOSIS — E03.9 HYPOTHYROIDISM, UNSPECIFIED TYPE: Primary | ICD-10-CM

## 2023-02-14 DIAGNOSIS — E66.01 CLASS 2 SEVERE OBESITY DUE TO EXCESS CALORIES WITH SERIOUS COMORBIDITY AND BODY MASS INDEX (BMI) OF 35.0 TO 35.9 IN ADULT (HCC): ICD-10-CM

## 2023-02-14 DIAGNOSIS — R63.5 WEIGHT GAIN: ICD-10-CM

## 2023-02-14 DIAGNOSIS — I10 ESSENTIAL HYPERTENSION: ICD-10-CM

## 2023-02-14 PROCEDURE — 1036F TOBACCO NON-USER: CPT | Performed by: NURSE PRACTITIONER

## 2023-02-14 PROCEDURE — 3074F SYST BP LT 130 MM HG: CPT | Performed by: NURSE PRACTITIONER

## 2023-02-14 PROCEDURE — G8427 DOCREV CUR MEDS BY ELIG CLIN: HCPCS | Performed by: NURSE PRACTITIONER

## 2023-02-14 PROCEDURE — G8484 FLU IMMUNIZE NO ADMIN: HCPCS | Performed by: NURSE PRACTITIONER

## 2023-02-14 PROCEDURE — G8417 CALC BMI ABV UP PARAM F/U: HCPCS | Performed by: NURSE PRACTITIONER

## 2023-02-14 PROCEDURE — 3017F COLORECTAL CA SCREEN DOC REV: CPT | Performed by: NURSE PRACTITIONER

## 2023-02-14 PROCEDURE — 99214 OFFICE O/P EST MOD 30 MIN: CPT | Performed by: NURSE PRACTITIONER

## 2023-02-14 PROCEDURE — 3079F DIAST BP 80-89 MM HG: CPT | Performed by: NURSE PRACTITIONER

## 2023-02-14 RX ORDER — TIRZEPATIDE 2.5 MG/.5ML
2.5 INJECTION, SOLUTION SUBCUTANEOUS WEEKLY
Qty: 4 ADJUSTABLE DOSE PRE-FILLED PEN SYRINGE | Refills: 0 | Status: SHIPPED | OUTPATIENT
Start: 2023-02-14 | End: 2023-02-18

## 2023-02-14 RX ORDER — VIT C/B6/B5/MAGNESIUM/HERB 173 50-5-6-5MG
CAPSULE ORAL DAILY
COMMUNITY

## 2023-02-14 RX ORDER — LEVOTHYROXINE SODIUM 0.07 MG/1
75 TABLET ORAL DAILY
Qty: 90 TABLET | Refills: 0 | Status: SHIPPED | OUTPATIENT
Start: 2023-02-14 | End: 2023-05-15

## 2023-02-14 SDOH — ECONOMIC STABILITY: HOUSING INSECURITY
IN THE LAST 12 MONTHS, WAS THERE A TIME WHEN YOU DID NOT HAVE A STEADY PLACE TO SLEEP OR SLEPT IN A SHELTER (INCLUDING NOW)?: NO

## 2023-02-14 SDOH — ECONOMIC STABILITY: FOOD INSECURITY: WITHIN THE PAST 12 MONTHS, THE FOOD YOU BOUGHT JUST DIDN'T LAST AND YOU DIDN'T HAVE MONEY TO GET MORE.: NEVER TRUE

## 2023-02-14 SDOH — ECONOMIC STABILITY: FOOD INSECURITY: WITHIN THE PAST 12 MONTHS, YOU WORRIED THAT YOUR FOOD WOULD RUN OUT BEFORE YOU GOT MONEY TO BUY MORE.: NEVER TRUE

## 2023-02-14 SDOH — ECONOMIC STABILITY: INCOME INSECURITY: HOW HARD IS IT FOR YOU TO PAY FOR THE VERY BASICS LIKE FOOD, HOUSING, MEDICAL CARE, AND HEATING?: NOT HARD AT ALL

## 2023-02-14 ASSESSMENT — PATIENT HEALTH QUESTIONNAIRE - PHQ9
1. LITTLE INTEREST OR PLEASURE IN DOING THINGS: 0
SUM OF ALL RESPONSES TO PHQ QUESTIONS 1-9: 0
2. FEELING DOWN, DEPRESSED OR HOPELESS: 0
SUM OF ALL RESPONSES TO PHQ QUESTIONS 1-9: 0
10. IF YOU CHECKED OFF ANY PROBLEMS, HOW DIFFICULT HAVE THESE PROBLEMS MADE IT FOR YOU TO DO YOUR WORK, TAKE CARE OF THINGS AT HOME, OR GET ALONG WITH OTHER PEOPLE: 0
SUM OF ALL RESPONSES TO PHQ QUESTIONS 1-9: 0
9. THOUGHTS THAT YOU WOULD BE BETTER OFF DEAD, OR OF HURTING YOURSELF: 0
SUM OF ALL RESPONSES TO PHQ9 QUESTIONS 1 & 2: 0
7. TROUBLE CONCENTRATING ON THINGS, SUCH AS READING THE NEWSPAPER OR WATCHING TELEVISION: 0
4. FEELING TIRED OR HAVING LITTLE ENERGY: 0
3. TROUBLE FALLING OR STAYING ASLEEP: 0
8. MOVING OR SPEAKING SO SLOWLY THAT OTHER PEOPLE COULD HAVE NOTICED. OR THE OPPOSITE, BEING SO FIGETY OR RESTLESS THAT YOU HAVE BEEN MOVING AROUND A LOT MORE THAN USUAL: 0
6. FEELING BAD ABOUT YOURSELF - OR THAT YOU ARE A FAILURE OR HAVE LET YOURSELF OR YOUR FAMILY DOWN: 0
SUM OF ALL RESPONSES TO PHQ QUESTIONS 1-9: 0
5. POOR APPETITE OR OVEREATING: 0

## 2023-02-14 ASSESSMENT — ENCOUNTER SYMPTOMS
ALLERGIC/IMMUNOLOGIC NEGATIVE: 1
RESPIRATORY NEGATIVE: 1
GASTROINTESTINAL NEGATIVE: 1
EYES NEGATIVE: 1

## 2023-02-14 NOTE — PATIENT INSTRUCTIONS
SURVEY:     You may be receiving a survey from [employer] regarding your visit today. Please complete the survey to enable us to provide the highest quality of care to you and your family. If you cannot score us a very good on any question, please call the office to discuss how we could have made your experience a very good one.      Thank you,    Jayla Silverman, APRN-CNP  Yolanda Perez, APRN-CNP  Too Tejeda, INDIAN  Luli Kapoor, LITZY Cordova, LITZY Ba, CMA  Otilia, PCA  Kiersten, PM

## 2023-02-14 NOTE — PROGRESS NOTES
[facility], [place] PHYSICIANS  [facility], [address] [facility] [place] PRIMARY CARE  [address]  Dept: [phone]  Dept Fax: [phone]      Name: Uzma Smith  : 1965         Chief Complaint:     Chief Complaint   Patient presents with    Hypertension    Swelling     Patient has left leg swelling for a few weeks     Weight Loss     Patient would like to discus mounjaro for weight loss        History of Present Illness:      Uzma Smith is a 65 y.o.  female who presents with Hypertension, Swelling (Patient has left leg swelling for a few weeks ), and Weight Loss (Patient would like to discus mounjaro for weight loss )    Aakash Bernal is here today for a routine officevisit. She has been having some swelling in her left lower leg. She has had bilateral leg edema on and off for a couple weeks. She has an indent when taking her socks off and has had an indent below her knee on left leg. She states it happens more after exercising. She denies any pain to her left leg. She states \"I have no pain with the Van sign\". She denies any improvement with elevating. She does have arthritis in both knees and has contributed it to that. Aakash Bernal is asking if she can try [facility] for weight loss. She has heard about it from a friend and is concerned about her inability to lose weight. She has been watching her diet and exercising regularly and continues to gain weight. She was unable to try the Ozempic due to cost.  She had success with Adipex but has gained that weight back. Past Medical History:     Past Medical History:   Diagnosis Date    GERD (gastroesophageal reflux disease)     Headache     Insomnia       Reviewed all health maintenance requirements and ordered appropriate tests  There are no preventive care reminders to display for this patient. Past Surgical History:     No past surgical history on file.      Medications:       Prior to Admission medications    Medication Sig Start Date End Date Taking? Authorizing Provider   turmeric (QC TUMERIC COMPLEX) 500 MG CAPS Take by mouth daily   Yes Historical Provider, MD   levothyroxine (SYNTHROID) 75 MCG tablet Take 1 tablet by mouth Daily 2/14/23 5/15/23 Yes CASTILLO Brothers - SUNDAR   Tirzepatide [facility]) 2.5 MG/0.5ML SOPN SC injection Inject 0.5 mLs into the skin once a week for 4 days 2/14/23 2/18/23 Yes CASTILLO Brothers - CNP   lisinopril (PRINIVIL;ZESTRIL) 10 MG tablet TAKE ONE TABLET BY MOUTH DAILY 2/6/23  Yes CASTILLO Brothers - CNP   sertraline (ZOLOFT) 50 MG tablet TAKE ONE TABLET BY MOUTH DAILY 10/13/22  Yes CASTILLO Brothers - CNP   fenofibrate (TRICOR) 48 MG tablet Take 1 tablet by mouth daily 8/31/22  Yes CASTILLO Venegas - SUNDAR   diclofenac sodium (VOLTAREN) 1 % GEL Apply 4 g topically 4 times daily 11/10/21 2/14/23 Yes CASTILLO Brothers - CNP   ibuprofen (ADVIL;MOTRIN) 800 MG tablet Take 800 mg by mouth every 6 hours as needed for Pain   Yes Historical Provider, MD   Cholecalciferol (VITAMIN D3) 50 MCG (2000 UT) CAPS Take by mouth daily   Yes Historical Provider, MD   vitamin B-12 (CYANOCOBALAMIN) 50 MCG tablet Take 50 mcg by mouth daily   Yes Historical Provider, MD   zoster recombinant adjuvanted vaccine [facility]) 50 MCG/0.5ML SUSR injection Inject 0.5 mLs into the muscle See Admin Instructions 1 dose now and repeat in 2-6 months  Patient not taking: Reported on 2/14/2023 11/14/22 5/13/23  CASTILLO Brothers CNP   Ascorbic Acid (VITAMIN C PO) Take by mouth  Patient not taking: No sig reported    Historical Provider, MD        Allergies:       Patient has no known allergies. Social History:     Tobacco:    reports that she quit smoking about 18 months ago. Her smoking use included cigarettes. She has a 30.00 pack-year smoking history. She has never used smokeless tobacco.  Alcohol:      reports no history of alcohol use. Drug Use:  reports no history of drug use.     Family History:     Family History   Problem Relation Age of Onset    Breast Cancer Maternal Grandmother        Review of Systems:     Positive and Negative as described in HPI    Review of Systems   Constitutional: Negative. HENT: Negative. Eyes: Negative. Respiratory: Negative. Cardiovascular:  Positive for leg swelling. Negative for chest pain and palpitations. Gastrointestinal: Negative. Endocrine: Negative. Genitourinary: Negative. Musculoskeletal: Negative. Skin: Negative. Allergic/Immunologic: Negative. Neurological: Negative. Hematological: Negative. Psychiatric/Behavioral: Negative. Physical Exam:   Vitals:  /84 (Site: Left Upper Arm, Position: Sitting)   Pulse 67   Temp 99.4 °F (37.4 °C) (Temporal)   Resp 18   Ht 5' 7.5\" (1.715 m)   Wt 216 lb 9.6 oz (98.2 kg)   LMP  (LMP Unknown)   SpO2 97%   BMI 33.42 kg/m²     Physical Exam  Vitals and nursing note reviewed. Constitutional:       Appearance: Normal appearance. HENT:      Head: Normocephalic. Right Ear: External ear normal.      Left Ear: External ear normal.      Nose: Nose normal.      Mouth/Throat:      Mouth: Mucous membranes are moist.      Pharynx: Oropharynx is clear. Eyes:      Extraocular Movements: Extraocular movements intact. Conjunctiva/sclera: Conjunctivae normal.      Pupils: Pupils are equal, round, and reactive to light. Cardiovascular:      Rate and Rhythm: Normal rate and regular rhythm. Heart sounds: Normal heart sounds. Pulmonary:      Effort: Pulmonary effort is normal.      Breath sounds: Normal breath sounds. Musculoskeletal:         General: Normal range of motion. Cervical back: Normal range of motion and neck supple. Right lower leg: Swelling present. Left lower leg: Swelling present. Right ankle: Swelling present. Left ankle: Swelling present. Comments: Nonpitting. No pain. Negative Van sign. Legs equal bilaterally.    Skin: General: Skin is warm. Capillary Refill: Capillary refill takes less than 2 seconds. Neurological:      General: No focal deficit present. Mental Status: She is alert and oriented to person, place, and time. Psychiatric:         Mood and Affect: Mood normal.         Behavior: Behavior normal.         Thought Content: Thought content normal.         Judgment: Judgment normal.       Data:     Lab Results   Component Value Date/Time     02/08/2022 11:08 AM    K 5.0 02/08/2022 11:08 AM     02/08/2022 11:08 AM    CO2 27 02/08/2022 11:08 AM    BUN 11 02/08/2022 11:08 AM    CREATININE 0.70 02/08/2022 11:08 AM    GLUCOSE 91 02/08/2022 11:08 AM    PROT 7.3 08/02/2019 11:45 AM    LABALBU 4.6 08/02/2019 11:45 AM    BILITOT 0.61 08/02/2019 11:45 AM    ALKPHOS 56 08/02/2019 11:45 AM    AST 17 11/25/2020 10:49 AM    ALT 17 11/25/2020 10:49 AM     Lab Results   Component Value Date/Time    WBC 4.8 02/08/2022 11:08 AM    RBC 4.65 02/08/2022 11:08 AM    HGB 13.5 02/08/2022 11:08 AM    HCT 42.5 02/08/2022 11:08 AM    MCV 91.4 02/08/2022 11:08 AM    MCH 29.0 02/08/2022 11:08 AM    MCHC 31.8 02/08/2022 11:08 AM    RDW 12.0 02/08/2022 11:08 AM     02/08/2022 11:08 AM    MPV 12.0 02/08/2022 11:08 AM     Lab Results   Component Value Date/Time    TSH 7.23 11/14/2022 11:46 AM     Lab Results   Component Value Date/Time    CHOL 203 02/08/2022 11:08 AM    HDL 55 02/08/2022 11:08 AM       Assessment/Plan:      Diagnosis Orders   1. Hypothyroidism, unspecified type  levothyroxine (SYNTHROID) 75 MCG tablet    Tirzepatide (MOUNJARO) 2.5 MG/0.5ML SOPN SC injection      2. Essential hypertension  Lipid Panel    CBC with Auto Differential    Comprehensive Metabolic Panel, Fasting      3. Class 2 severe obesity due to excess calories with serious comorbidity and body mass index (BMI) of 35.0 to 35.9 in adult (HCC)  Tirzepatide (MOUNJARO) 2.5 MG/0.5ML SOPN SC injection      4.  Weight gain  Tirzepatide [facility]) 2.5 MG/0.5ML SOPN SC injection        Continue Synthroid 75 mcg tablets. Patient reminded to get TSH level and will call with further recommendations on Synthroid dose. Start Mounjaro 2.5 mg subcutaneously weekly. Will reevaluate in 4 weeks. Continue diet and exercise. Patient encouraged to try compression stockings. Elevate feet when sitting. Continue to monitor swelling at home. Routine labs drawn and will call with results and further recommendations. 1.  Lili Gonzalez received counseling on the following healthy behaviors: nutrition, exercise, and medication adherence  2. Patient given educational materials - see patient instructions  3. Was a self-tracking handout given in paper form or via [employer]? No  If yes, see orders or list here. 4.  Discussed use, benefit, and side effects of prescribed medications. Barriers to medication compliance addressed. All patient questions answered. Pt voiced understanding. 5.  Reviewed prior labs and health maintenance  6. Continue current medications, diet and exercise. Completed Refills   Requested Prescriptions     Signed Prescriptions Disp Refills    levothyroxine (SYNTHROID) 75 MCG tablet 90 tablet 0     Sig: Take 1 tablet by mouth Daily    Tirzepatide (MOUNJARO) 2.5 MG/0.5ML SOPN SC injection 4 Adjustable Dose Pre-filled Pen Syringe 0     Sig: Inject 0.5 mLs into the skin once a week for 4 days         Return in about 1 month (around 3/14/2023).

## 2023-03-07 ENCOUNTER — HOSPITAL ENCOUNTER (OUTPATIENT)
Age: 58
Discharge: HOME | End: 2023-03-07
Payer: COMMERCIAL

## 2023-03-07 ENCOUNTER — TELEPHONE (OUTPATIENT)
Dept: PRIMARY CARE CLINIC | Age: 58
End: 2023-03-07

## 2023-03-07 DIAGNOSIS — E03.9 HYPOTHYROIDISM (ACQUIRED): ICD-10-CM

## 2023-03-07 DIAGNOSIS — I10 ESSENTIAL HYPERTENSION: ICD-10-CM

## 2023-03-07 LAB
ABSOLUTE EOS #: 0.13 K/UL (ref 0–0.44)
ABSOLUTE IMMATURE GRANULOCYTE: <0.03 K/UL (ref 0–0.3)
ABSOLUTE LYMPH #: 1.53 K/UL (ref 1.1–3.7)
ABSOLUTE MONO #: 0.4 K/UL (ref 0.1–1.2)
ALBUMIN SERPL-MCNC: 4.7 G/DL (ref 3.5–5.2)
ALBUMIN/GLOBULIN RATIO: 1.6 (ref 1–2.5)
ALP SERPL-CCNC: 71 U/L (ref 35–104)
ALT SERPL-CCNC: 19 U/L (ref 5–33)
ANION GAP SERPL CALCULATED.3IONS-SCNC: 11 MMOL/L (ref 9–17)
AST SERPL-CCNC: 21 U/L
BASOPHILS # BLD: 1 % (ref 0–2)
BASOPHILS ABSOLUTE: 0.03 K/UL (ref 0–0.2)
BILIRUB SERPL-MCNC: 0.6 MG/DL (ref 0.3–1.2)
BUN SERPL-MCNC: 19 MG/DL (ref 6–20)
BUN/CREAT BLD: 22 (ref 9–20)
CALCIUM SERPL-MCNC: 9.6 MG/DL (ref 8.6–10.4)
CHLORIDE SERPL-SCNC: 103 MMOL/L (ref 98–107)
CHOLEST SERPL-MCNC: 175 MG/DL
CHOLESTEROL/HDL RATIO: 3.5
CO2 SERPL-SCNC: 25 MMOL/L (ref 20–31)
CREAT SERPL-MCNC: 0.87 MG/DL (ref 0.5–0.9)
EOSINOPHILS RELATIVE PERCENT: 3 % (ref 1–4)
GFR SERPL CREATININE-BSD FRML MDRD: >60 ML/MIN/1.73M2
GLUCOSE SERPL-MCNC: 87 MG/DL (ref 70–99)
HCT VFR BLD AUTO: 42.5 % (ref 36.3–47.1)
HDLC SERPL-MCNC: 50 MG/DL
HGB BLD-MCNC: 13.8 G/DL (ref 11.9–15.1)
IMMATURE GRANULOCYTES: 0 %
LDLC SERPL CALC-MCNC: 106 MG/DL (ref 0–130)
LYMPHOCYTES # BLD: 33 % (ref 24–43)
MCH RBC QN AUTO: 28.4 PG (ref 25.2–33.5)
MCHC RBC AUTO-ENTMCNC: 32.5 G/DL (ref 28.4–34.8)
MCV RBC AUTO: 87.4 FL (ref 82.6–102.9)
MONOCYTES # BLD: 9 % (ref 3–12)
NRBC AUTOMATED: 0 PER 100 WBC
PDW BLD-RTO: 12.3 % (ref 11.8–14.4)
PLATELET # BLD AUTO: 142 K/UL (ref 138–453)
PMV BLD AUTO: 12.4 FL (ref 8.1–13.5)
POTASSIUM SERPL-SCNC: 4.2 MMOL/L (ref 3.7–5.3)
PROT SERPL-MCNC: 7.6 G/DL (ref 6.4–8.3)
RBC # BLD: 4.86 M/UL (ref 3.95–5.11)
SEG NEUTROPHILS: 54 % (ref 36–65)
SEGMENTED NEUTROPHILS ABSOLUTE COUNT: 2.48 K/UL (ref 1.5–8.1)
SODIUM SERPL-SCNC: 139 MMOL/L (ref 135–144)
TRIGL SERPL-MCNC: 94 MG/DL
TSH SERPL-ACNC: 1.78 UIU/ML (ref 0.3–5)
WBC # BLD AUTO: 4.6 K/UL (ref 3.5–11.3)

## 2023-03-07 PROCEDURE — 80061 LIPID PANEL: CPT

## 2023-03-07 PROCEDURE — 80053 COMPREHEN METABOLIC PANEL: CPT

## 2023-03-07 PROCEDURE — 85025 COMPLETE CBC W/AUTO DIFF WBC: CPT

## 2023-03-07 PROCEDURE — 84443 ASSAY THYROID STIM HORMONE: CPT

## 2023-03-07 PROCEDURE — 36415 COLL VENOUS BLD VENIPUNCTURE: CPT

## 2023-03-07 NOTE — TELEPHONE ENCOUNTER
----- Message from CASTILLO Diaz CNP sent at 3/7/2023  3:37 PM EST -----  Please notify patient of normal lab results.   Thanks Yunior Oliveira

## 2023-03-14 ENCOUNTER — OFFICE VISIT (OUTPATIENT)
Dept: PRIMARY CARE CLINIC | Age: 58
End: 2023-03-14
Payer: COMMERCIAL

## 2023-03-14 VITALS
WEIGHT: 215 LBS | HEART RATE: 71 BPM | RESPIRATION RATE: 18 BRPM | BODY MASS INDEX: 33.18 KG/M2 | DIASTOLIC BLOOD PRESSURE: 74 MMHG | TEMPERATURE: 99.6 F | SYSTOLIC BLOOD PRESSURE: 116 MMHG | OXYGEN SATURATION: 98 %

## 2023-03-14 DIAGNOSIS — I10 ESSENTIAL HYPERTENSION: Primary | ICD-10-CM

## 2023-03-14 DIAGNOSIS — L98.9 SKIN LESION OF FACE: ICD-10-CM

## 2023-03-14 DIAGNOSIS — E66.01 CLASS 2 SEVERE OBESITY DUE TO EXCESS CALORIES WITH SERIOUS COMORBIDITY AND BODY MASS INDEX (BMI) OF 35.0 TO 35.9 IN ADULT (HCC): ICD-10-CM

## 2023-03-14 PROCEDURE — 3074F SYST BP LT 130 MM HG: CPT | Performed by: NURSE PRACTITIONER

## 2023-03-14 PROCEDURE — G8484 FLU IMMUNIZE NO ADMIN: HCPCS | Performed by: NURSE PRACTITIONER

## 2023-03-14 PROCEDURE — 3017F COLORECTAL CA SCREEN DOC REV: CPT | Performed by: NURSE PRACTITIONER

## 2023-03-14 PROCEDURE — 3078F DIAST BP <80 MM HG: CPT | Performed by: NURSE PRACTITIONER

## 2023-03-14 PROCEDURE — 1036F TOBACCO NON-USER: CPT | Performed by: NURSE PRACTITIONER

## 2023-03-14 PROCEDURE — 99214 OFFICE O/P EST MOD 30 MIN: CPT | Performed by: NURSE PRACTITIONER

## 2023-03-14 PROCEDURE — G8427 DOCREV CUR MEDS BY ELIG CLIN: HCPCS | Performed by: NURSE PRACTITIONER

## 2023-03-14 PROCEDURE — G8417 CALC BMI ABV UP PARAM F/U: HCPCS | Performed by: NURSE PRACTITIONER

## 2023-03-14 ASSESSMENT — PATIENT HEALTH QUESTIONNAIRE - PHQ9
7. TROUBLE CONCENTRATING ON THINGS, SUCH AS READING THE NEWSPAPER OR WATCHING TELEVISION: 0
3. TROUBLE FALLING OR STAYING ASLEEP: 0
10. IF YOU CHECKED OFF ANY PROBLEMS, HOW DIFFICULT HAVE THESE PROBLEMS MADE IT FOR YOU TO DO YOUR WORK, TAKE CARE OF THINGS AT HOME, OR GET ALONG WITH OTHER PEOPLE: 0
8. MOVING OR SPEAKING SO SLOWLY THAT OTHER PEOPLE COULD HAVE NOTICED. OR THE OPPOSITE, BEING SO FIGETY OR RESTLESS THAT YOU HAVE BEEN MOVING AROUND A LOT MORE THAN USUAL: 0
SUM OF ALL RESPONSES TO PHQ QUESTIONS 1-9: 0
5. POOR APPETITE OR OVEREATING: 0
2. FEELING DOWN, DEPRESSED OR HOPELESS: 0
9. THOUGHTS THAT YOU WOULD BE BETTER OFF DEAD, OR OF HURTING YOURSELF: 0
SUM OF ALL RESPONSES TO PHQ QUESTIONS 1-9: 0
6. FEELING BAD ABOUT YOURSELF - OR THAT YOU ARE A FAILURE OR HAVE LET YOURSELF OR YOUR FAMILY DOWN: 0
4. FEELING TIRED OR HAVING LITTLE ENERGY: 0
SUM OF ALL RESPONSES TO PHQ9 QUESTIONS 1 & 2: 0
1. LITTLE INTEREST OR PLEASURE IN DOING THINGS: 0

## 2023-03-14 ASSESSMENT — ENCOUNTER SYMPTOMS
EYES NEGATIVE: 1
ALLERGIC/IMMUNOLOGIC NEGATIVE: 1
RESPIRATORY NEGATIVE: 1
COLOR CHANGE: 1

## 2023-03-14 NOTE — PATIENT INSTRUCTIONS
SURVEY:     You may be receiving a survey from [employer] regarding your visit today. Please complete the survey to enable us to provide the highest quality of care to you and your family. If you cannot score us a very good on any question, please call the office to discuss how we could have made your experience a very good one.      Thank you,    Alex Silverman, APRN-CNP  Graham Arauz, APRN-CNP  Birgit Sanchez, INDIAN  Tameka Burt, LITZY Cruz, LITZY Ba, CMA  Otilia, PCA  Kiersten, PM

## 2023-03-14 NOTE — PROGRESS NOTES
[facility]  DANA SHEETS CNP  [facility] PRIMARY CARE  [address] [place] [id number]  Dept: [phone]  Dept Fax: [phone]      Name: Sonam Gillette  : 1965         Chief Complaint:     Chief Complaint   Patient presents with    Hypertension     1 month check.     Weight Management     1 month check. Patient was unable to get Mounjaro.     Skin Problem     Patient having some skin issues she would like to talk about.        History of Present Illness:      Sonam Gillette is a 57 y.o.  female who presents with Hypertension (1 month check. ), Weight Management (1 month check. Patient was unable to get Mounjaro. ), and Skin Problem (Patient having some skin issues she would like to talk about. )    ELEN Masters is here today for a routine office visit.  She continues to have mild intermittent left leg edema.  She has not been working out currently and states she has had decreased swelling.  She has not tried the compression stockings.  She denies pain to the leg.  She does not wake up with leg swelling.    She was not able to afford the monjaro that was previously prescribed because it was not covered by her insurance.  She states she does not want to try Adipex again.  She states she is eating healthier and has increased the protein in her diet.      Sonam has a spot on her left cheek that she wants looked at.  She denies any changes in in the size or color to lesion.  She denies itching but itches it because she knows its there.  She also has some skin discoloration to top of nose.    Past Medical History:     Past Medical History:   Diagnosis Date    GERD (gastroesophageal reflux disease)     Headache     Insomnia       Reviewed all health maintenance requirements and ordered appropriate tests  There are no preventive care reminders to display for this patient.    Past Surgical History:     No past surgical history on file.     Medications:       Prior to Admission medications   Medication Sig Start Date End Date Taking? Authorizing Provider   turmeric 500 MG CAPS Take by mouth daily   Yes Historical Provider, MD   levothyroxine (SYNTHROID) 75 MCG tablet Take 1 tablet by mouth Daily 2/14/23 5/15/23 Yes CASTILLO Gray CNP   lisinopril (PRINIVIL;ZESTRIL) 10 MG tablet TAKE ONE TABLET BY MOUTH DAILY 2/6/23  Yes CASTILLO Gray CNP   sertraline (ZOLOFT) 50 MG tablet TAKE ONE TABLET BY MOUTH DAILY 10/13/22  Yes CASTILLO Gray CNP   fenofibrate (TRICOR) 48 MG tablet Take 1 tablet by mouth daily 8/31/22  Yes CASTILLO Melara CNP   diclofenac sodium (VOLTAREN) 1 % GEL Apply 4 g topically 4 times daily 11/10/21 3/14/23 Yes CASTILLO Gray CNP   ibuprofen (ADVIL;MOTRIN) 800 MG tablet Take 800 mg by mouth every 6 hours as needed for Pain   Yes Historical Provider, MD   Cholecalciferol (VITAMIN D3) 50 MCG (2000 UT) CAPS Take by mouth daily   Yes Historical Provider, MD   vitamin B-12 (CYANOCOBALAMIN) 50 MCG tablet Take 50 mcg by mouth daily   Yes Historical Provider, MD   Ascorbic Acid (VITAMIN C PO) Take by mouth  Patient not taking: No sig reported    Historical Provider, MD        Allergies:       Patient has no known allergies. Social History:     Tobacco:    reports that she quit smoking about 19 months ago. Her smoking use included cigarettes. She has a 30.00 pack-year smoking history. She has never used smokeless tobacco.  Alcohol:      reports no history of alcohol use. Drug Use:  reports no history of drug use. Family History:     Family History   Problem Relation Age of Onset    Breast Cancer Maternal Grandmother        Review of Systems:     Positive and Negative as described in HPI    Review of Systems   Constitutional: Negative. HENT: Negative. Eyes: Negative. Respiratory: Negative. Cardiovascular:  Positive for leg swelling (left leg intermittent). Endocrine: Negative. Genitourinary: Negative.     Musculoskeletal: Negative. Skin:  Positive for color change. Allergic/Immunologic: Negative. Neurological: Negative. Hematological: Negative. Psychiatric/Behavioral: Negative. Physical Exam:   Vitals:  /74   Pulse 71   Temp 99.6 °F (37.6 °C) (Temporal)   Resp 18   Wt 215 lb (97.5 kg)   LMP  (LMP Unknown)   SpO2 98%   BMI 33.18 kg/m²     Physical Exam  Vitals and nursing note reviewed. Constitutional:       General: She is not in acute distress. Appearance: Normal appearance. She is obese. HENT:      Head: Normocephalic. Right Ear: External ear normal.      Left Ear: External ear normal.      Nose: Nose normal.      Mouth/Throat:      Mouth: Mucous membranes are moist.      Pharynx: Oropharynx is clear. Eyes:      Pupils: Pupils are equal, round, and reactive to light. Cardiovascular:      Rate and Rhythm: Normal rate and regular rhythm. Heart sounds: Normal heart sounds. Pulmonary:      Effort: Pulmonary effort is normal.      Breath sounds: Normal breath sounds. Musculoskeletal:         General: Normal range of motion. Cervical back: Normal range of motion. Right lower leg: Normal. No swelling. Left lower leg: Normal. No swelling. Skin:     General: Skin is warm. Capillary Refill: Capillary refill takes less than 2 seconds. Findings: Rash present. Rash is papular. Comments: Small flesh colored papule to left cheek. Neurological:      General: No focal deficit present. Mental Status: She is alert and oriented to person, place, and time. Psychiatric:         Mood and Affect: Mood normal.         Behavior: Behavior normal.         Thought Content:  Thought content normal.       Data:     Lab Results   Component Value Date/Time     03/07/2023 09:30 AM    K 4.2 03/07/2023 09:30 AM     03/07/2023 09:30 AM    CO2 25 03/07/2023 09:30 AM    BUN 19 03/07/2023 09:30 AM    CREATININE 0.87 03/07/2023 09:30 AM    GLUCOSE 91 02/08/2022 11:08 AM    PROT 7.6 03/07/2023 09:30 AM    LABALBU 4.7 03/07/2023 09:30 AM    BILITOT 0.6 03/07/2023 09:30 AM    ALKPHOS 71 03/07/2023 09:30 AM    AST 21 03/07/2023 09:30 AM    ALT 19 03/07/2023 09:30 AM     Lab Results   Component Value Date/Time    WBC 4.6 03/07/2023 09:30 AM    RBC 4.86 03/07/2023 09:30 AM    HGB 13.8 03/07/2023 09:30 AM    HCT 42.5 03/07/2023 09:30 AM    MCV 87.4 03/07/2023 09:30 AM    MCH 28.4 03/07/2023 09:30 AM    MCHC 32.5 03/07/2023 09:30 AM    RDW 12.3 03/07/2023 09:30 AM     03/07/2023 09:30 AM    MPV 12.4 03/07/2023 09:30 AM     Lab Results   Component Value Date/Time    TSH 1.78 03/07/2023 09:30 AM     Lab Results   Component Value Date/Time    CHOL 175 03/07/2023 09:30 AM    HDL 50 03/07/2023 09:30 AM       Assessment/Plan:      Diagnosis Orders   1. Essential hypertension        2. Class 2 severe obesity due to excess calories with serious comorbidity and body mass index (BMI) of 35.0 to 35.9 in adult ([facility] [address]        3. Skin lesion of face          Recommend referral to Dermatology and patient refused. Discussed lifestyle modifications for continued weight loss. Recommend compression stockings and elevating legs when at rest.  Recommend continued physical activity. No changes in medications today. 1.  Jordan Driver received counseling on the following healthy behaviors: nutrition, exercise, and medication adherence  2. Patient given educational materials - see patient instructions  3. Was a self-tracking handout given in paper form or via [employer]? No  If yes, see orders or list here. 4.  Discussed use, benefit, and side effects of prescribed medications. Barriers to medication compliance addressed. All patient questions answered. Pt voiced understanding. 5.  Reviewed prior labs and health maintenance  6. Continue current medications, diet and exercise.     Completed Refills   Requested Prescriptions      No prescriptions requested or ordered in this encounter Return in about 6 months (around 9/14/2023).

## 2023-03-20 RX ORDER — FENOFIBRATE 48 MG/1
TABLET, COATED ORAL
Qty: 90 TABLET | Refills: 1 | Status: SHIPPED | OUTPATIENT
Start: 2023-03-20

## 2023-04-27 ENCOUNTER — OFFICE VISIT (OUTPATIENT)
Dept: PRIMARY CARE CLINIC | Age: 58
End: 2023-04-27

## 2023-04-27 VITALS
TEMPERATURE: 98.9 F | RESPIRATION RATE: 18 BRPM | SYSTOLIC BLOOD PRESSURE: 108 MMHG | DIASTOLIC BLOOD PRESSURE: 70 MMHG | OXYGEN SATURATION: 98 % | BODY MASS INDEX: 34.26 KG/M2 | HEART RATE: 85 BPM | WEIGHT: 222 LBS

## 2023-04-27 DIAGNOSIS — I83.893 VARICOSE VEINS OF LEG WITH EDEMA, BILATERAL: ICD-10-CM

## 2023-04-27 DIAGNOSIS — I87.2 VENOUS INSUFFICIENCY OF BOTH LOWER EXTREMITIES: Primary | ICD-10-CM

## 2023-04-27 ASSESSMENT — ENCOUNTER SYMPTOMS
GASTROINTESTINAL NEGATIVE: 1
RESPIRATORY NEGATIVE: 1
ALLERGIC/IMMUNOLOGIC NEGATIVE: 1
EYES NEGATIVE: 1

## 2023-04-27 ASSESSMENT — PATIENT HEALTH QUESTIONNAIRE - PHQ9
2. FEELING DOWN, DEPRESSED OR HOPELESS: 0
SUM OF ALL RESPONSES TO PHQ QUESTIONS 1-9: 0
SUM OF ALL RESPONSES TO PHQ9 QUESTIONS 1 & 2: 0
SUM OF ALL RESPONSES TO PHQ QUESTIONS 1-9: 0
1. LITTLE INTEREST OR PLEASURE IN DOING THINGS: 0

## 2023-04-27 NOTE — PROGRESS NOTES
[facility]  [facility], [address] [facility] PRIMARY CARE  [address] [facility] [address] [place] [address]  Dept: [phone]  Dept Fax: [phone]      Name: Ascencion Tinajero  : 1965         Chief Complaint:     Chief Complaint   Patient presents with    Ankle Pain     Patient c/o left ankle and knee swelling and pain. Denies injury. Ongoing. History of Present Illness:      Ascencion Tinajero is a 65 y.o.  female who presents with Ankle Pain (Patient c/o left ankle and knee swelling and pain. Denies injury. Ongoing. )      ELEN Villatoro is here today for bilateral ankle swelling. She is also having swelling in her left lower leg. She states she is having increased right knee pain also with swelling. She is having increased left medial ankle pain that started about a week ago. She has no history of surgery on the left knee. She states she feels clicking in her left knee with bending. She does have increased pain and swelling in her left knee and leg with activity and if it is hot out. She states worsening pain with when she gets hot. She states she wakes up with no pain or swelling and as the day goes by she will get pitting edema in her leg and ankles. She denies any calve pain. She denies any increased size in 1 leg over the other. She has not tried anything for the swelling. She denies pain in veins. Past Medical History:     Past Medical History:   Diagnosis Date    GERD (gastroesophageal reflux disease)     Headache     Insomnia       Reviewed all health maintenance requirements and ordered appropriate tests  Health Maintenance Due   Topic Date Due    Colorectal Cancer Screen  2023       Past Surgical History:     No past surgical history on file. Medications:       Prior to Admission medications    Medication Sig Start Date End Date Taking?  Authorizing Provider   fenofibrate (TRICOR) 48 MG tablet TAKE ONE TABLET BY MOUTH DAILY 3/20/23  Yes CASTILLO Amezquita - CNP

## 2023-04-27 NOTE — PATIENT INSTRUCTIONS
SURVEY:     You may be receiving a survey from [employer] regarding your visit today. Please complete the survey to enable us to provide the highest quality of care to you and your family. If you cannot score us a very good on any question, please call the office to discuss how we could have made your experience a very good one.      Thank you,    Jocelyn Silverman, APRN-CNP  Bertha Herbert, APRN-CNP  Lynn Maldonado, CYNDI Hess, LITZY Verdugo, LITZY Ba, CMA  Otilia, PCA  Kiersten, PM

## 2023-05-01 DIAGNOSIS — F32.A ANXIETY AND DEPRESSION: ICD-10-CM

## 2023-05-01 DIAGNOSIS — F41.9 ANXIETY AND DEPRESSION: ICD-10-CM

## 2023-05-01 NOTE — TELEPHONE ENCOUNTER
Health Maintenance   Topic Date Due    Colorectal Cancer Screen  05/19/2023    COVID-19 Vaccine (1) 05/11/2023 (Originally 1965)    Hepatitis C screen  05/11/2023 (Originally 5/19/1984)    Cervical cancer screen  06/16/2023 (Originally 5/19/1987)    Flu vaccine (Season Ended) 11/14/2023 (Originally 8/1/2023)    HIV screen  11/14/2023 (Originally 5/19/1981)    Depression Monitoring  04/27/2024    Breast cancer screen  07/01/2024    Lipids  03/07/2028    DTaP/Tdap/Td vaccine (2 - Td or Tdap) 11/14/2032    Shingles vaccine  Completed    Hepatitis A vaccine  Aged Out    Hib vaccine  Aged Out    Meningococcal (ACWY) vaccine  Aged Out    Pneumococcal 0-64 years Vaccine  Aged Out    Low dose CT lung screening  Discontinued             (applicable per patient's age: Cancer Screenings, Depression Screening, Fall Risk Screening, Immunizations)    LDL Cholesterol (mg/dL)   Date Value   03/07/2023 106     AST (U/L)   Date Value   03/07/2023 21     ALT (U/L)   Date Value   03/07/2023 19     BUN (mg/dL)   Date Value   03/07/2023 19      (goal A1C is < 7)   (goal LDL is <100) need 30-50% reduction from baseline     BP Readings from Last 3 Encounters:   04/27/23 108/70   03/14/23 116/74   02/14/23 122/84    (goal /80)      All Future Testing planned in [facility]:  Lab Frequency Next Occurrence   VL DUP LOWER EXTREMITY VENOUS BILATERAL Once 04/27/2023       Next Visit Date:  Future Appointments   Date Time Provider Jeramy Barney   5/11/2023  1:40 PM CASTILLO Lucas CNPf [facility]   9/14/2023  1:20 PM CASTILLO Lucas CNP [facility]            Patient Active Problem List:     Thrombocytopenia ([address]     Essential hypertension

## 2023-05-04 ENCOUNTER — HOSPITAL ENCOUNTER (OUTPATIENT)
Dept: VASCULAR LAB | Age: 58
Discharge: HOME | End: 2023-05-06
Payer: COMMERCIAL

## 2023-05-04 DIAGNOSIS — I87.2 VENOUS INSUFFICIENCY OF BOTH LOWER EXTREMITIES: ICD-10-CM

## 2023-05-04 PROCEDURE — 93970 EXTREMITY STUDY: CPT

## 2023-05-05 ENCOUNTER — TELEPHONE (OUTPATIENT)
Dept: PRIMARY CARE CLINIC | Age: 58
End: 2023-05-05

## 2023-05-11 ENCOUNTER — OFFICE VISIT (OUTPATIENT)
Dept: PRIMARY CARE CLINIC | Age: 58
End: 2023-05-11
Payer: COMMERCIAL

## 2023-05-11 VITALS
WEIGHT: 222.6 LBS | BODY MASS INDEX: 34.35 KG/M2 | SYSTOLIC BLOOD PRESSURE: 112 MMHG | TEMPERATURE: 98.4 F | HEART RATE: 68 BPM | RESPIRATION RATE: 18 BRPM | OXYGEN SATURATION: 97 % | DIASTOLIC BLOOD PRESSURE: 64 MMHG

## 2023-05-11 DIAGNOSIS — M25.572 ACUTE LEFT ANKLE PAIN: Primary | ICD-10-CM

## 2023-05-11 DIAGNOSIS — R53.83 OTHER FATIGUE: ICD-10-CM

## 2023-05-11 DIAGNOSIS — E03.9 HYPOTHYROIDISM, UNSPECIFIED TYPE: ICD-10-CM

## 2023-05-11 DIAGNOSIS — I83.893 VARICOSE VEINS OF LEG WITH EDEMA, BILATERAL: ICD-10-CM

## 2023-05-11 PROCEDURE — 3074F SYST BP LT 130 MM HG: CPT | Performed by: NURSE PRACTITIONER

## 2023-05-11 PROCEDURE — G8427 DOCREV CUR MEDS BY ELIG CLIN: HCPCS | Performed by: NURSE PRACTITIONER

## 2023-05-11 PROCEDURE — 1036F TOBACCO NON-USER: CPT | Performed by: NURSE PRACTITIONER

## 2023-05-11 PROCEDURE — G8417 CALC BMI ABV UP PARAM F/U: HCPCS | Performed by: NURSE PRACTITIONER

## 2023-05-11 PROCEDURE — 3017F COLORECTAL CA SCREEN DOC REV: CPT | Performed by: NURSE PRACTITIONER

## 2023-05-11 PROCEDURE — 99214 OFFICE O/P EST MOD 30 MIN: CPT | Performed by: NURSE PRACTITIONER

## 2023-05-11 PROCEDURE — 3078F DIAST BP <80 MM HG: CPT | Performed by: NURSE PRACTITIONER

## 2023-05-11 ASSESSMENT — PATIENT HEALTH QUESTIONNAIRE - PHQ9
SUM OF ALL RESPONSES TO PHQ QUESTIONS 1-9: 0
10. IF YOU CHECKED OFF ANY PROBLEMS, HOW DIFFICULT HAVE THESE PROBLEMS MADE IT FOR YOU TO DO YOUR WORK, TAKE CARE OF THINGS AT HOME, OR GET ALONG WITH OTHER PEOPLE: 0
2. FEELING DOWN, DEPRESSED OR HOPELESS: 0
7. TROUBLE CONCENTRATING ON THINGS, SUCH AS READING THE NEWSPAPER OR WATCHING TELEVISION: 0
5. POOR APPETITE OR OVEREATING: 0
SUM OF ALL RESPONSES TO PHQ QUESTIONS 1-9: 0
4. FEELING TIRED OR HAVING LITTLE ENERGY: 0
9. THOUGHTS THAT YOU WOULD BE BETTER OFF DEAD, OR OF HURTING YOURSELF: 0
8. MOVING OR SPEAKING SO SLOWLY THAT OTHER PEOPLE COULD HAVE NOTICED. OR THE OPPOSITE, BEING SO FIGETY OR RESTLESS THAT YOU HAVE BEEN MOVING AROUND A LOT MORE THAN USUAL: 0
3. TROUBLE FALLING OR STAYING ASLEEP: 0
SUM OF ALL RESPONSES TO PHQ QUESTIONS 1-9: 0
1. LITTLE INTEREST OR PLEASURE IN DOING THINGS: 0
SUM OF ALL RESPONSES TO PHQ QUESTIONS 1-9: 0
6. FEELING BAD ABOUT YOURSELF - OR THAT YOU ARE A FAILURE OR HAVE LET YOURSELF OR YOUR FAMILY DOWN: 0
SUM OF ALL RESPONSES TO PHQ9 QUESTIONS 1 & 2: 0

## 2023-05-11 NOTE — PROGRESS NOTES
CO2 25 03/07/2023 09:30 AM    BUN 19 03/07/2023 09:30 AM    CREATININE 0.87 03/07/2023 09:30 AM    GLUCOSE 91 02/08/2022 11:08 AM    PROT 7.6 03/07/2023 09:30 AM    LABALBU 4.7 03/07/2023 09:30 AM    BILITOT 0.6 03/07/2023 09:30 AM    ALKPHOS 71 03/07/2023 09:30 AM    AST 21 03/07/2023 09:30 AM    ALT 19 03/07/2023 09:30 AM     Lab Results   Component Value Date/Time    WBC 4.6 03/07/2023 09:30 AM    RBC 4.86 03/07/2023 09:30 AM    HGB 13.8 03/07/2023 09:30 AM    HCT 42.5 03/07/2023 09:30 AM    MCV 87.4 03/07/2023 09:30 AM    MCH 28.4 03/07/2023 09:30 AM    MCHC 32.5 03/07/2023 09:30 AM    RDW 12.3 03/07/2023 09:30 AM     03/07/2023 09:30 AM    MPV 12.4 03/07/2023 09:30 AM     Lab Results   Component Value Date/Time    TSH 1.78 03/07/2023 09:30 AM     Lab Results   Component Value Date/Time    CHOL 175 03/07/2023 09:30 AM    HDL 50 03/07/2023 09:30 AM       Assessment/Plan:      Diagnosis Orders   1. Acute left ankle pain  XR ANKLE LEFT (MIN 3 VIEWS)      2. Varicose veins of leg with edema, bilateral        3. Other fatigue  TSH With Reflex Ft4    Vitamin D 25 Hydroxy    Vitamin B12 & Folate    Iron and TIBC      4. Hypothyroidism, unspecified type  External Referral To Endocrinology    TSH With Reflex Ft4        X-ray ordered of left ankle and will call with results and further recommendations. Recommend RICE therapy at this time. Referral to endocrinology for further evaluation and treatment. Labs ordered and will call with results and further recommendations. Lifestyle modifications recommended. 1.  Alana Blanchard received counseling on the following healthy behaviors: nutrition, exercise, and medication adherence  2. Patient given educational materials - see patient instructions  3. Was a self-tracking handout given in paper form or via [employer]? No  If yes, see orders or list here. 4.  Discussed use, benefit, and side effects of prescribed medications. Barriers to medication compliance addressed.

## 2023-05-11 NOTE — PATIENT INSTRUCTIONS
SURVEY:     You may be receiving a survey from [facility] regarding your visit today. Please complete the survey to enable us to provide the highest quality of care to you and your family. If you cannot score us a very good on any question, please call the office to discuss how we could have made your experience a very good one.      Thank you,    Tiffany Silverman, APRN-CNP  Nola Fitch, APRN-CNP  Amparo Young, INDIAN  Olga Caruso, LITZY Campa, CMA  Jesenia, CMA  Otilia, PCA  Kiersten, PM

## 2023-05-12 ASSESSMENT — ENCOUNTER SYMPTOMS
RESPIRATORY NEGATIVE: 1
GASTROINTESTINAL NEGATIVE: 1
EYES NEGATIVE: 1

## 2023-05-15 ENCOUNTER — HOSPITAL ENCOUNTER (OUTPATIENT)
Age: 58
Discharge: HOME | End: 2023-05-17
Payer: COMMERCIAL

## 2023-05-15 ENCOUNTER — HOSPITAL ENCOUNTER (OUTPATIENT)
Age: 58
Discharge: HOME | End: 2023-05-15
Payer: COMMERCIAL

## 2023-05-15 ENCOUNTER — HOSPITAL ENCOUNTER (OUTPATIENT)
Dept: GENERAL RADIOLOGY | Age: 58
Discharge: HOME | End: 2023-05-17
Payer: COMMERCIAL

## 2023-05-15 DIAGNOSIS — E03.9 HYPOTHYROIDISM, UNSPECIFIED TYPE: ICD-10-CM

## 2023-05-15 DIAGNOSIS — M25.572 ACUTE LEFT ANKLE PAIN: ICD-10-CM

## 2023-05-15 DIAGNOSIS — R53.83 OTHER FATIGUE: ICD-10-CM

## 2023-05-15 LAB — TSH SERPL-ACNC: 4.29 UIU/ML (ref 0.3–5)

## 2023-05-15 PROCEDURE — 73610 X-RAY EXAM OF ANKLE: CPT

## 2023-05-15 PROCEDURE — 82306 VITAMIN D 25 HYDROXY: CPT

## 2023-05-15 PROCEDURE — 83550 IRON BINDING TEST: CPT

## 2023-05-15 PROCEDURE — 84443 ASSAY THYROID STIM HORMONE: CPT

## 2023-05-15 PROCEDURE — 82746 ASSAY OF FOLIC ACID SERUM: CPT

## 2023-05-15 PROCEDURE — 83540 ASSAY OF IRON: CPT

## 2023-05-15 PROCEDURE — 82607 VITAMIN B-12: CPT

## 2023-05-15 PROCEDURE — 36415 COLL VENOUS BLD VENIPUNCTURE: CPT

## 2023-05-16 ENCOUNTER — TELEPHONE (OUTPATIENT)
Dept: PRIMARY CARE CLINIC | Age: 58
End: 2023-05-16

## 2023-05-16 DIAGNOSIS — M25.572 ACUTE LEFT ANKLE PAIN: Primary | ICD-10-CM

## 2023-05-16 LAB
25(OH)D3 SERPL-MCNC: 81.2 NG/ML
FOLATE SERPL-MCNC: 13.4 NG/ML
IRON SATN MFR SERPL: 37 % (ref 20–55)
IRON SERPL-MCNC: 99 UG/DL (ref 37–145)
TIBC SERPL-MCNC: 267 UG/DL (ref 250–450)
UNSATURATED IRON BINDING CAPACITY: 168 UG/DL (ref 112–347)
VIT B12 SERPL-MCNC: 1904 PG/ML (ref 232–1245)

## 2023-05-16 NOTE — TELEPHONE ENCOUNTER
----- Message from CASTILLO Brady - CNP sent at 5/16/2023  8:13 AM EDT -----  Please notify patient of normal xray results. There is a bone spur present that could possibly causing the increased pain. Ask her if she would like to see ortho for further evaluation?  Thanks Antonietta's

## 2023-05-16 NOTE — TELEPHONE ENCOUNTER
----- Message from CASTILLO Mendez CNP sent at 5/16/2023  8:17 AM EDT -----  Please notify patient of normal lab results. Her TSH is sveta than it was 2 months ago but it is still within normal limits. Some people feel better with it lower. I can adjust her Synthroid or she can wait to see what they say at Endocrinology since we did a referral to endocrinology?   Thanks Antonietta's

## 2023-05-16 NOTE — TELEPHONE ENCOUNTER
Called and let patient know she voiced she would like a referral to ortho for further evaluation.   Than you

## 2023-05-22 DIAGNOSIS — E03.9 HYPOTHYROIDISM, UNSPECIFIED TYPE: ICD-10-CM

## 2023-05-22 RX ORDER — LEVOTHYROXINE SODIUM 0.07 MG/1
75 TABLET ORAL DAILY
Qty: 90 TABLET | Refills: 0 | Status: SHIPPED | OUTPATIENT
Start: 2023-05-22 | End: 2023-08-20

## 2023-05-22 NOTE — TELEPHONE ENCOUNTER
Health Maintenance   Topic Date Due    COVID-19 Vaccine (1) Never done    Hepatitis C screen  Never done    Colorectal Cancer Screen  05/19/2023    Cervical cancer screen  06/16/2023 (Originally 5/19/1987)    Flu vaccine (Season Ended) 11/14/2023 (Originally 8/1/2023)    HIV screen  11/14/2023 (Originally 5/19/1981)    Depression Monitoring  05/11/2024    Breast cancer screen  07/01/2024    Lipids  03/07/2028    DTaP/Tdap/Td vaccine (2 - Td or Tdap) 11/14/2032    Shingles vaccine  Completed    Hepatitis A vaccine  Aged Out    Hib vaccine  Aged Out    Meningococcal (ACWY) vaccine  Aged Out    Pneumococcal 0-64 years Vaccine  Aged Out    Low dose CT lung screening  Discontinued             (applicable per patient's age: Cancer Screenings, Depression Screening, Fall Risk Screening, Immunizations)    LDL Cholesterol (mg/dL)   Date Value   03/07/2023 106     AST (U/L)   Date Value   03/07/2023 21     ALT (U/L)   Date Value   03/07/2023 19     BUN (mg/dL)   Date Value   03/07/2023 19      (goal A1C is < 7)   (goal LDL is <100) need 30-50% reduction from baseline     BP Readings from Last 3 Encounters:   05/11/23 112/64   04/27/23 108/70   03/14/23 116/74    (goal /80)      All Future Testing planned in [facility]:  Lab Frequency Next Occurrence       Next Visit Date:  Future Appointments   Date Time Provider Jeramy Barney   9/14/2023  1:20 PM Javier Tran, APRN - CNP [facility]            Patient Active Problem List:     Thrombocytopenia ([facility] [address]     Essential hypertension

## 2023-06-20 ENCOUNTER — HOSPITAL ENCOUNTER (OUTPATIENT)
Dept: MRI IMAGING | Age: 58
Discharge: HOME | End: 2023-06-22
Attending: PODIATRIST
Payer: COMMERCIAL

## 2023-06-20 DIAGNOSIS — M93.979 OSTEOCHONDRITIS OF TALUS: ICD-10-CM

## 2023-06-20 PROCEDURE — 73721 MRI JNT OF LWR EXTRE W/O DYE: CPT

## 2023-08-23 DIAGNOSIS — I10 ESSENTIAL HYPERTENSION: ICD-10-CM

## 2023-08-23 RX ORDER — LISINOPRIL 10 MG/1
TABLET ORAL
Qty: 90 TABLET | Refills: 1 | Status: SHIPPED | OUTPATIENT
Start: 2023-08-23

## 2023-08-23 NOTE — TELEPHONE ENCOUNTER
Health Maintenance   Topic Date Due    COVID-19 Vaccine (1) Never done    Hepatitis C screen  Never done    Cervical cancer screen  Never done    Colorectal Cancer Screen  05/19/2023    Flu vaccine (1) Never done    HIV screen  11/14/2023 (Originally [birth year]/19/1981)    Depression Monitoring  05/11/2024    Breast cancer screen  07/01/2024    Lipids  03/07/2028    DTaP/Tdap/Td vaccine (2 - Td or Tdap) 11/14/2032    Shingles vaccine  Completed    Hepatitis A vaccine  Aged Out    Hib vaccine  Aged Out    Meningococcal (ACWY) vaccine  Aged Out    Pneumococcal 0-64 years Vaccine  Aged Out    Low dose CT lung screening &/or counseling  Discontinued             (applicable per patient's age: Cancer Screenings, Depression Screening, Fall Risk Screening, Immunizations)    LDL Cholesterol (mg/dL)   Date Value   03/07/2023 106     AST (U/L)   Date Value   03/07/2023 21     ALT (U/L)   Date Value   03/07/2023 19     BUN (mg/dL)   Date Value   03/07/2023 19      (goal A1C is < 7)   (goal LDL is <100) need 30-50% reduction from baseline     BP Readings from Last 3 Encounters:   05/11/23 112/64   04/27/23 108/70   03/14/23 116/74    (goal /80)      All Future Testing planned in [facility]:  Lab Frequency Next Occurrence       Next Visit Date:  Future Appointments   Date Time Provider [address]   9/14/2023  1:20 PM CASTILLO Varghese - CNP [facility]            Patient Active Problem List:     Thrombocytopenia ([address])     Essential hypertension

## 2023-08-28 ENCOUNTER — TELEPHONE (OUTPATIENT)
Dept: PRIMARY CARE CLINIC | Age: 58
End: 2023-08-28

## 2023-08-28 DIAGNOSIS — M25.561 ACUTE PAIN OF BOTH KNEES: Primary | ICD-10-CM

## 2023-08-28 DIAGNOSIS — E03.9 HYPOTHYROIDISM, UNSPECIFIED TYPE: ICD-10-CM

## 2023-08-28 DIAGNOSIS — M25.562 ACUTE PAIN OF BOTH KNEES: Primary | ICD-10-CM

## 2023-08-28 RX ORDER — LEVOTHYROXINE SODIUM 0.07 MG/1
75 TABLET ORAL DAILY
Qty: 90 TABLET | Refills: 0 | Status: SHIPPED | OUTPATIENT
Start: 2023-08-28 | End: 2023-11-26

## 2023-08-28 RX ORDER — MELOXICAM 15 MG/1
15 TABLET ORAL DAILY
Qty: 90 TABLET | Refills: 0 | Status: SHIPPED | OUTPATIENT
Start: 2023-08-28 | End: 2023-11-26

## 2023-08-28 NOTE — TELEPHONE ENCOUNTER
Health Maintenance   Topic Date Due    COVID-19 Vaccine (1) Never done    Hepatitis C screen  Never done    Cervical cancer screen  Never done    Colorectal Cancer Screen  05/19/2023    Flu vaccine (1) Never done    HIV screen  11/14/2023 (Originally 5/19/1981)    Depression Monitoring  05/11/2024    Breast cancer screen  07/01/2024    Lipids  03/07/2028    DTaP/Tdap/Td vaccine (2 - Td or Tdap) 11/14/2032    Shingles vaccine  Completed    Hepatitis A vaccine  Aged Out    Hib vaccine  Aged Out    Meningococcal (ACWY) vaccine  Aged Out    Pneumococcal 0-64 years Vaccine  Aged Out    Low dose CT lung screening &/or counseling  Discontinued             (applicable per patient's age: Cancer Screenings, Depression Screening, Fall Risk Screening, Immunizations)    LDL Cholesterol (mg/dL)   Date Value   03/07/2023 106     AST (U/L)   Date Value   03/07/2023 21     ALT (U/L)   Date Value   03/07/2023 19     BUN (mg/dL)   Date Value   03/07/2023 19      (goal A1C is < 7)   (goal LDL is <100) need 30-50% reduction from baseline     BP Readings from Last 3 Encounters:   05/11/23 112/64   04/27/23 108/70   03/14/23 116/74    (goal /80)      All Future Testing planned in [facility]:  Lab Frequency Next Occurrence       Next Visit Date:  Future Appointments   Date Time Provider [address]   9/14/2023  1:20 PM CASTILLO Mcgrath - CNP [facility]            Patient Active Problem List:     Thrombocytopenia ([address])     Essential hypertension

## 2023-09-14 ENCOUNTER — OFFICE VISIT (OUTPATIENT)
Dept: PRIMARY CARE CLINIC | Age: 58
End: 2023-09-14
Payer: COMMERCIAL

## 2023-09-14 VITALS
BODY MASS INDEX: 35 KG/M2 | TEMPERATURE: 99 F | DIASTOLIC BLOOD PRESSURE: 82 MMHG | SYSTOLIC BLOOD PRESSURE: 126 MMHG | OXYGEN SATURATION: 96 % | HEART RATE: 71 BPM | WEIGHT: 226.8 LBS

## 2023-09-14 DIAGNOSIS — E03.9 HYPOTHYROIDISM, UNSPECIFIED TYPE: Primary | ICD-10-CM

## 2023-09-14 DIAGNOSIS — M25.562 CHRONIC PAIN OF BOTH KNEES: ICD-10-CM

## 2023-09-14 DIAGNOSIS — M25.561 CHRONIC PAIN OF BOTH KNEES: ICD-10-CM

## 2023-09-14 DIAGNOSIS — G89.29 CHRONIC PAIN OF BOTH KNEES: ICD-10-CM

## 2023-09-14 DIAGNOSIS — Z12.11 COLON CANCER SCREENING: ICD-10-CM

## 2023-09-14 PROCEDURE — 3079F DIAST BP 80-89 MM HG: CPT | Performed by: NURSE PRACTITIONER

## 2023-09-14 PROCEDURE — G8427 DOCREV CUR MEDS BY ELIG CLIN: HCPCS | Performed by: NURSE PRACTITIONER

## 2023-09-14 PROCEDURE — 99214 OFFICE O/P EST MOD 30 MIN: CPT | Performed by: NURSE PRACTITIONER

## 2023-09-14 PROCEDURE — 1036F TOBACCO NON-USER: CPT | Performed by: NURSE PRACTITIONER

## 2023-09-14 PROCEDURE — 3017F COLORECTAL CA SCREEN DOC REV: CPT | Performed by: NURSE PRACTITIONER

## 2023-09-14 PROCEDURE — G8417 CALC BMI ABV UP PARAM F/U: HCPCS | Performed by: NURSE PRACTITIONER

## 2023-09-14 PROCEDURE — 3074F SYST BP LT 130 MM HG: CPT | Performed by: NURSE PRACTITIONER

## 2023-09-14 ASSESSMENT — PATIENT HEALTH QUESTIONNAIRE - PHQ9
3. TROUBLE FALLING OR STAYING ASLEEP: 0
SUM OF ALL RESPONSES TO PHQ9 QUESTIONS 1 & 2: 0
1. LITTLE INTEREST OR PLEASURE IN DOING THINGS: 0
SUM OF ALL RESPONSES TO PHQ QUESTIONS 1-9: 0
SUM OF ALL RESPONSES TO PHQ QUESTIONS 1-9: 0
10. IF YOU CHECKED OFF ANY PROBLEMS, HOW DIFFICULT HAVE THESE PROBLEMS MADE IT FOR YOU TO DO YOUR WORK, TAKE CARE OF THINGS AT HOME, OR GET ALONG WITH OTHER PEOPLE: 0
6. FEELING BAD ABOUT YOURSELF - OR THAT YOU ARE A FAILURE OR HAVE LET YOURSELF OR YOUR FAMILY DOWN: 0
8. MOVING OR SPEAKING SO SLOWLY THAT OTHER PEOPLE COULD HAVE NOTICED. OR THE OPPOSITE, BEING SO FIGETY OR RESTLESS THAT YOU HAVE BEEN MOVING AROUND A LOT MORE THAN USUAL: 0
5. POOR APPETITE OR OVEREATING: 0
9. THOUGHTS THAT YOU WOULD BE BETTER OFF DEAD, OR OF HURTING YOURSELF: 0
2. FEELING DOWN, DEPRESSED OR HOPELESS: 0
SUM OF ALL RESPONSES TO PHQ QUESTIONS 1-9: 0
4. FEELING TIRED OR HAVING LITTLE ENERGY: 0
7. TROUBLE CONCENTRATING ON THINGS, SUCH AS READING THE NEWSPAPER OR WATCHING TELEVISION: 0
SUM OF ALL RESPONSES TO PHQ QUESTIONS 1-9: 0

## 2023-09-14 ASSESSMENT — ENCOUNTER SYMPTOMS
EYES NEGATIVE: 1
RESPIRATORY NEGATIVE: 1
GASTROINTESTINAL NEGATIVE: 1
ALLERGIC/IMMUNOLOGIC NEGATIVE: 1

## 2023-09-14 NOTE — PROGRESS NOTES
03/07/2023 09:30 AM    MCHC 32.5 03/07/2023 09:30 AM    RDW 12.3 03/07/2023 09:30 AM     03/07/2023 09:30 AM    MPV 12.4 03/07/2023 09:30 AM     Lab Results   Component Value Date/Time    TSH 4.29 05/15/2023 01:20 PM     Lab Results   Component Value Date/Time    CHOL 175 03/07/2023 09:30 AM    HDL 50 03/07/2023 09:30 AM       Assessment/Plan:      Diagnosis Orders   1. Hypothyroidism, unspecified type        2. Chronic pain of both knees        3. Colon cancer screening  Fecal DNA Colorectal cancer screening (Cologuard)        Today I suggested for her knee pain: Physical therapy, Celebrex instead of Mobic, Referral to Orthopedics, and Pain management and patient refused all. Encouraged her to call if changes her mind. May continue to use Mobic daily if needed. Cologuard ordered and will call with results and further recommendations. No change in medication today. 1.  Kumar Has received counseling on the following healthy behaviors: nutrition, exercise, and medication adherence  2. Patient given educational materials - see patient instructions  3. Was a self-tracking handout given in paper form or via [employer]? No  If yes, see orders or list here. 4.  Discussed use, benefit, and side effects of prescribed medications. Barriers to medication compliance addressed. All patient questions answered. Pt voiced understanding. 5.  Reviewed prior labs and health maintenance  6. Continue current medications, diet and exercise. Completed Refills   Requested Prescriptions      No prescriptions requested or ordered in this encounter         Return in about 6 months (around 3/14/2024).

## 2023-09-14 NOTE — PATIENT INSTRUCTIONS
SURVEY:     You may be receiving a survey from [facility] regarding your visit today. Please complete the survey to enable us to provide the highest quality of care to you and your family. If you cannot score us a very good on any question, please call the office to discuss how we could have made your experience a very good one.      Thank you,    Gonzalo Silverman, APRN-CNP  Get Jones, APRN-CNP  Janel Barrow, INDIAN  Afshan Doe, CMA  Susana Wilson, CMA  Jesenia, CMA  Otilia, PCA  Kiersten, PM

## 2023-10-06 ENCOUNTER — TELEPHONE (OUTPATIENT)
Dept: PRIMARY CARE CLINIC | Age: 58
End: 2023-10-06

## 2023-10-19 LAB — NONINV COLON CA DNA+OCC BLD SCRN STL QL: NEGATIVE

## 2023-11-07 DIAGNOSIS — F32.A ANXIETY AND DEPRESSION: ICD-10-CM

## 2023-11-07 DIAGNOSIS — F41.9 ANXIETY AND DEPRESSION: ICD-10-CM

## 2023-11-07 RX ORDER — FENOFIBRATE 48 MG/1
48 TABLET, COATED ORAL DAILY
Qty: 90 TABLET | Refills: 1 | Status: SHIPPED | OUTPATIENT
Start: 2023-11-07

## 2023-11-07 NOTE — TELEPHONE ENCOUNTER
Health Maintenance   Topic Date Due    Cervical cancer screen  Never done    Flu vaccine (1) Never done    HIV screen  11/14/2023 (Originally 5/19/1981)    Hepatitis B vaccine (1 of 3 - 3-dose series) 09/14/2024 (Originally 1965)    COVID-19 Vaccine (1) 09/14/2024 (Originally 1965)    Hepatitis C screen  09/14/2024 (Originally 5/19/1984)    Breast cancer screen  07/01/2024    Depression Monitoring  09/14/2024    Colorectal Cancer Screen  10/13/2026    Lipids  03/07/2028    DTaP/Tdap/Td vaccine (2 - Td or Tdap) 11/14/2032    Shingles vaccine  Completed    Hepatitis A vaccine  Aged Out    Hib vaccine  Aged Out    Meningococcal (ACWY) vaccine  Aged Out    Pneumococcal 0-64 years Vaccine  Aged Out    Low dose CT lung screening &/or counseling  Discontinued             (applicable per patient's age: Cancer Screenings, Depression Screening, Fall Risk Screening, Immunizations)    LDL Cholesterol (mg/dL)   Date Value   03/07/2023 106     AST (U/L)   Date Value   03/07/2023 21     ALT (U/L)   Date Value   03/07/2023 19     BUN (mg/dL)   Date Value   03/07/2023 19      (goal A1C is < 7)   (goal LDL is <100) need 30-50% reduction from baseline     BP Readings from Last 3 Encounters:   09/14/23 126/82   05/11/23 112/64   04/27/23 108/70    (goal /80)      All Future Testing planned in [facility]:  Lab Frequency Next Occurrence       Next Visit Date:  Future Appointments   Date Time Provider [address]   3/14/2024  1:20 PM CASTILLO Everett - CNP [facility]            Patient Active Problem List:     Thrombocytopenia [facility])     Essential hypertension

## 2023-11-17 ENCOUNTER — OFFICE VISIT (OUTPATIENT)
Dept: PRIMARY CARE CLINIC | Age: 58
End: 2023-11-17
Payer: COMMERCIAL

## 2023-11-17 VITALS
OXYGEN SATURATION: 97 % | DIASTOLIC BLOOD PRESSURE: 74 MMHG | SYSTOLIC BLOOD PRESSURE: 126 MMHG | RESPIRATION RATE: 18 BRPM | BODY MASS INDEX: 35.51 KG/M2 | TEMPERATURE: 99.8 F | HEART RATE: 79 BPM | WEIGHT: 230.1 LBS

## 2023-11-17 DIAGNOSIS — U07.1 COVID-19 VIRUS INFECTION: Primary | ICD-10-CM

## 2023-11-17 DIAGNOSIS — R05.1 ACUTE COUGH: ICD-10-CM

## 2023-11-17 LAB
INFLUENZA A ANTIGEN, POC: NEGATIVE
INFLUENZA B ANTIGEN, POC: NEGATIVE
LOT NUMBER POC: ABNORMAL
SARS-COV-2 RNA POC - COV: DETECTED
VALID INTERNAL CONTROL, POC: PRESENT
VENDOR AND KIT NAME POC: ABNORMAL

## 2023-11-17 PROCEDURE — 3074F SYST BP LT 130 MM HG: CPT | Performed by: NURSE PRACTITIONER

## 2023-11-17 PROCEDURE — 3078F DIAST BP <80 MM HG: CPT | Performed by: NURSE PRACTITIONER

## 2023-11-17 PROCEDURE — G8427 DOCREV CUR MEDS BY ELIG CLIN: HCPCS | Performed by: NURSE PRACTITIONER

## 2023-11-17 PROCEDURE — 3017F COLORECTAL CA SCREEN DOC REV: CPT | Performed by: NURSE PRACTITIONER

## 2023-11-17 PROCEDURE — 99213 OFFICE O/P EST LOW 20 MIN: CPT | Performed by: NURSE PRACTITIONER

## 2023-11-17 PROCEDURE — G8484 FLU IMMUNIZE NO ADMIN: HCPCS | Performed by: NURSE PRACTITIONER

## 2023-11-17 PROCEDURE — G8417 CALC BMI ABV UP PARAM F/U: HCPCS | Performed by: NURSE PRACTITIONER

## 2023-11-17 PROCEDURE — 1036F TOBACCO NON-USER: CPT | Performed by: NURSE PRACTITIONER

## 2023-11-17 ASSESSMENT — ENCOUNTER SYMPTOMS
WHEEZING: 0
RHINORRHEA: 0
SHORTNESS OF BREATH: 0
SINUS PAIN: 0
NAUSEA: 0
SWOLLEN GLANDS: 0
ABDOMINAL PAIN: 0
COUGH: 1
SINUS PRESSURE: 1
VOMITING: 0
SORE THROAT: 1
TROUBLE SWALLOWING: 0
DIARRHEA: 0

## 2023-11-17 NOTE — PROGRESS NOTES
Name: Alexandra Corey  : 1965         Chief Complaint:     Chief Complaint   Patient presents with    Dizziness     X2 days    Cough     X4 days    Fever     X1 day       History of Present Illness:      Alexandra Corey is a 63 y.o.  female who presents with Dizziness (X2 days), Cough (X4 days), and Fever (X1 day)      Darylene Dace is here today for a same day office visit. URI   This is a new problem. The current episode started in the past 7 days. The problem has been waxing and waning. Maximum temperature: SUBJECTIVE. The fever has been present for 1 to 2 days. Associated symptoms include congestion, coughing, headaches and a sore throat. Pertinent negatives include no abdominal pain, chest pain, diarrhea, dysuria, ear pain, joint pain, joint swelling, nausea, neck pain, plugged ear sensation, rash, rhinorrhea, sinus pain, sneezing, swollen glands, vomiting or wheezing. She has tried decongestant for the symptoms. The treatment provided mild relief. Past Medical History:     Past Medical History:   Diagnosis Date    GERD (gastroesophageal reflux disease)     Headache     Insomnia       Reviewed all health maintenance requirements and ordered appropriate tests  Health Maintenance Due   Topic Date Due    HIV screen  Never done    Cervical cancer screen  Never done    Flu vaccine (1) Never done       Past Surgical History:     History reviewed. No pertinent surgical history. Medications:       Prior to Admission medications    Medication Sig Start Date End Date Taking?  Authorizing Provider   fenofibrate (TRICOR) 48 MG tablet Take 1 tablet by mouth daily 23  Yes CASTILLO Lockwood CNP   sertraline (ZOLOFT) 50 MG tablet TAKE ONE TABLET BY MOUTH DAILY 23  Yes CASTILLO Lockwood CNP   levothyroxine (SYNTHROID) 75 MCG tablet Take 1 tablet by mouth Daily 23 Yes CASTILLO Lockwood CNP   meloxicam (MOBIC) 15 MG tablet Take 1 tablet by mouth daily 23 Yes

## 2023-11-30 DIAGNOSIS — E03.9 HYPOTHYROIDISM, UNSPECIFIED TYPE: ICD-10-CM

## 2023-11-30 RX ORDER — LEVOTHYROXINE SODIUM 0.07 MG/1
75 TABLET ORAL DAILY
Qty: 90 TABLET | Refills: 0 | Status: SHIPPED | OUTPATIENT
Start: 2023-11-30 | End: 2024-02-28

## 2023-11-30 NOTE — TELEPHONE ENCOUNTER
Health Maintenance   Topic Date Due    HIV screen  Never done    Cervical cancer screen  Never done    Flu vaccine (1) Never done    Hepatitis B vaccine (1 of 3 - 3-dose series) 09/14/2024 (Originally 1965)    COVID-19 Vaccine (1) 09/14/2024 (Originally 1965)    Hepatitis C screen  09/14/2024 (Originally 5/19/1984)    Breast cancer screen  07/01/2024    Depression Monitoring  09/14/2024    Colorectal Cancer Screen  10/13/2026    Lipids  03/07/2028    DTaP/Tdap/Td vaccine (2 - Td or Tdap) 11/14/2032    Shingles vaccine  Completed    Hepatitis A vaccine  Aged Out    Hib vaccine  Aged Out    Meningococcal (ACWY) vaccine  Aged Out    Pneumococcal 0-64 years Vaccine  Aged Out    Low dose CT lung screening &/or counseling  Discontinued             (applicable per patient's age: Cancer Screenings, Depression Screening, Fall Risk Screening, Immunizations)    LDL Cholesterol (mg/dL)   Date Value   03/07/2023 106     AST (U/L)   Date Value   03/07/2023 21     ALT (U/L)   Date Value   03/07/2023 19     BUN (mg/dL)   Date Value   03/07/2023 19      (goal A1C is < 7)   (goal LDL is <100) need 30-50% reduction from baseline     BP Readings from Last 3 Encounters:   11/17/23 126/74   09/14/23 126/82   05/11/23 112/64    (goal /80)      All Future Testing planned in [facility]:  Lab Frequency Next Occurrence       Next Visit Date:  Future Appointments   Date Time Provider [address]   3/14/2024  1:20 PM CASTILLO Richmond - CNP [facility]            Patient Active Problem List:     Thrombocytopenia [facility])     Essential hypertension

## 2024-02-22 ENCOUNTER — OFFICE VISIT (OUTPATIENT)
Dept: PRIMARY CARE CLINIC | Age: 59
End: 2024-02-22
Payer: COMMERCIAL

## 2024-02-22 VITALS
BODY MASS INDEX: 35.4 KG/M2 | TEMPERATURE: 99.9 F | HEART RATE: 70 BPM | SYSTOLIC BLOOD PRESSURE: 102 MMHG | RESPIRATION RATE: 16 BRPM | OXYGEN SATURATION: 97 % | HEIGHT: 68 IN | WEIGHT: 233.6 LBS | DIASTOLIC BLOOD PRESSURE: 64 MMHG

## 2024-02-22 DIAGNOSIS — J02.9 PHARYNGITIS, UNSPECIFIED ETIOLOGY: ICD-10-CM

## 2024-02-22 DIAGNOSIS — R05.9 COUGH, UNSPECIFIED TYPE: ICD-10-CM

## 2024-02-22 DIAGNOSIS — J06.9 VIRAL URI WITH COUGH: Primary | ICD-10-CM

## 2024-02-22 LAB
INFLUENZA A ANTIGEN, POC: NEGATIVE
INFLUENZA B ANTIGEN, POC: NEGATIVE
LOT NUMBER POC: ABNORMAL
SARS-COV-2 RNA POC - COV: ABNORMAL
VALID INTERNAL CONTROL, POC: PRESENT
VENDOR AND KIT NAME POC: ABNORMAL

## 2024-02-22 PROCEDURE — 1036F TOBACCO NON-USER: CPT | Performed by: NURSE PRACTITIONER

## 2024-02-22 PROCEDURE — G8427 DOCREV CUR MEDS BY ELIG CLIN: HCPCS | Performed by: NURSE PRACTITIONER

## 2024-02-22 PROCEDURE — G8417 CALC BMI ABV UP PARAM F/U: HCPCS | Performed by: NURSE PRACTITIONER

## 2024-02-22 PROCEDURE — 3078F DIAST BP <80 MM HG: CPT | Performed by: NURSE PRACTITIONER

## 2024-02-22 PROCEDURE — 99214 OFFICE O/P EST MOD 30 MIN: CPT | Performed by: NURSE PRACTITIONER

## 2024-02-22 PROCEDURE — 3074F SYST BP LT 130 MM HG: CPT | Performed by: NURSE PRACTITIONER

## 2024-02-22 PROCEDURE — 3017F COLORECTAL CA SCREEN DOC REV: CPT | Performed by: NURSE PRACTITIONER

## 2024-02-22 PROCEDURE — G8484 FLU IMMUNIZE NO ADMIN: HCPCS | Performed by: NURSE PRACTITIONER

## 2024-02-22 SDOH — ECONOMIC STABILITY: FOOD INSECURITY: WITHIN THE PAST 12 MONTHS, YOU WORRIED THAT YOUR FOOD WOULD RUN OUT BEFORE YOU GOT MONEY TO BUY MORE.: NEVER TRUE

## 2024-02-22 SDOH — ECONOMIC STABILITY: INCOME INSECURITY: HOW HARD IS IT FOR YOU TO PAY FOR THE VERY BASICS LIKE FOOD, HOUSING, MEDICAL CARE, AND HEATING?: NOT HARD AT ALL

## 2024-02-22 SDOH — ECONOMIC STABILITY: FOOD INSECURITY: WITHIN THE PAST 12 MONTHS, THE FOOD YOU BOUGHT JUST DIDN'T LAST AND YOU DIDN'T HAVE MONEY TO GET MORE.: NEVER TRUE

## 2024-02-22 ASSESSMENT — PATIENT HEALTH QUESTIONNAIRE - PHQ9
3. TROUBLE FALLING OR STAYING ASLEEP: 0
2. FEELING DOWN, DEPRESSED OR HOPELESS: 0
SUM OF ALL RESPONSES TO PHQ QUESTIONS 1-9: 0
10. IF YOU CHECKED OFF ANY PROBLEMS, HOW DIFFICULT HAVE THESE PROBLEMS MADE IT FOR YOU TO DO YOUR WORK, TAKE CARE OF THINGS AT HOME, OR GET ALONG WITH OTHER PEOPLE: 0
SUM OF ALL RESPONSES TO PHQ QUESTIONS 1-9: 0
6. FEELING BAD ABOUT YOURSELF - OR THAT YOU ARE A FAILURE OR HAVE LET YOURSELF OR YOUR FAMILY DOWN: 0
1. LITTLE INTEREST OR PLEASURE IN DOING THINGS: 0
9. THOUGHTS THAT YOU WOULD BE BETTER OFF DEAD, OR OF HURTING YOURSELF: 0
SUM OF ALL RESPONSES TO PHQ9 QUESTIONS 1 & 2: 0
7. TROUBLE CONCENTRATING ON THINGS, SUCH AS READING THE NEWSPAPER OR WATCHING TELEVISION: 0
4. FEELING TIRED OR HAVING LITTLE ENERGY: 0
SUM OF ALL RESPONSES TO PHQ QUESTIONS 1-9: 0
5. POOR APPETITE OR OVEREATING: 0
8. MOVING OR SPEAKING SO SLOWLY THAT OTHER PEOPLE COULD HAVE NOTICED. OR THE OPPOSITE, BEING SO FIGETY OR RESTLESS THAT YOU HAVE BEEN MOVING AROUND A LOT MORE THAN USUAL: 0
SUM OF ALL RESPONSES TO PHQ QUESTIONS 1-9: 0

## 2024-02-22 ASSESSMENT — ENCOUNTER SYMPTOMS
WHEEZING: 1
GASTROINTESTINAL NEGATIVE: 1
SORE THROAT: 1
ALLERGIC/IMMUNOLOGIC NEGATIVE: 1
RHINORRHEA: 0
EYES NEGATIVE: 1
COUGH: 1

## 2024-02-22 NOTE — PROGRESS NOTES
[facility]  DANA SHEETS CNP  [facility]  [address] [place] [id number]  Dept: [phone]  Dept Fax: [phone]      Name: Sonam Gillette  : 1965         Chief Complaint:     Chief Complaint   Patient presents with    Sore Throat     X 2 days.     Cough     X 2 days.     Fatigue     X 2 days.        History of Present Illness:      Sonam Gillette is a 58 y.o.  female who presents with Sore Throat (X 2 days. ), Cough (X 2 days. ), and Fatigue (X 2 days. )      HPI  Sonam is here today for a sick visit.  She states yesterday it started with a sore throat.  She has a cough that is productive.  She has increased fatigue. She denies rhinorrhea.  She denies ear pain.  She has a headache.  She has had some wheezing at night.  She has had chills and sweats.  She has a low grade fever today.  Her granddaughter who lives with her has Influenza and was diagnosed Monday.  She has not taken anything OTC for her symptoms.      Past Medical History:     Past Medical History:   Diagnosis Date    GERD (gastroesophageal reflux disease)     Headache     Insomnia       Reviewed all health maintenance requirements and ordered appropriate tests  Health Maintenance Due   Topic Date Due    Cervical cancer screen  Never done       Past Surgical History:     No past surgical history on file.     Medications:       Prior to Admission medications    Medication Sig Start Date End Date Taking? Authorizing Provider   levothyroxine (SYNTHROID) 75 MCG tablet Take 1 tablet by mouth Daily 23 Yes Dana Sheets APRN - CNP   fenofibrate (TRICOR) 48 MG tablet Take 1 tablet by mouth daily 23  Yes Dana Sheets APRN - CNP   sertraline (ZOLOFT) 50 MG tablet TAKE ONE TABLET BY MOUTH DAILY 23  Yes Dana Sheets APRN - CNP   lisinopril (PRINIVIL;ZESTRIL) 10 MG tablet TAKE ONE TABLET BY MOUTH DAILY 23  Yes Dana Sheets APRN - CNP   turmeric 500 MG CAPS Take by mouth

## 2024-02-22 NOTE — PATIENT INSTRUCTIONS
SURVEY:     You may be receiving a survey from [facility] [employer] regarding your visit today.     Please complete the survey to enable us to provide the highest quality of care to you and your family.     If you cannot score us a very good on any question, please call the office to discuss how we could have made your experience a very good one.     Thank you,    Domo iSlverman, APRN-CNP  Elham Murphy, APRN-CNP  Tiffanie, LPN  Franci, CMA  Gabino, CMA  Jesenia, CMA  Otilia, PCA  Phoebe, CMA  Kiersten, PM

## 2024-02-27 DIAGNOSIS — E03.9 HYPOTHYROIDISM, UNSPECIFIED TYPE: ICD-10-CM

## 2024-02-27 DIAGNOSIS — I10 ESSENTIAL HYPERTENSION: ICD-10-CM

## 2024-02-27 RX ORDER — LEVOTHYROXINE SODIUM 0.07 MG/1
75 TABLET ORAL DAILY
Qty: 90 TABLET | Refills: 1 | Status: SHIPPED | OUTPATIENT
Start: 2024-02-27 | End: 2024-08-25

## 2024-02-27 RX ORDER — LISINOPRIL 10 MG/1
10 TABLET ORAL DAILY
Qty: 90 TABLET | Refills: 1 | Status: SHIPPED | OUTPATIENT
Start: 2024-02-27

## 2024-02-27 NOTE — TELEPHONE ENCOUNTER
Health Maintenance   Topic Date Due    Cervical cancer screen  Never done    Hepatitis B vaccine (1 of 3 - 3-dose series) 09/14/2024 (Originally 1965)    COVID-19 Vaccine (1) 09/14/2024 (Originally 1965)    Hepatitis C screen  09/14/2024 (Originally 5/19/1984)    Flu vaccine (1) 02/22/2025 (Originally 8/1/2023)    HIV screen  02/22/2025 (Originally 5/19/1981)    Breast cancer screen  07/01/2024    Depression Monitoring  02/22/2025    Colorectal Cancer Screen  10/13/2026    Lipids  03/07/2028    DTaP/Tdap/Td vaccine (2 - Td or Tdap) 11/14/2032    Shingles vaccine  Completed    Hepatitis A vaccine  Aged Out    Hib vaccine  Aged Out    Polio vaccine  Aged Out    Meningococcal (ACWY) vaccine  Aged Out    Pneumococcal 0-64 years Vaccine  Aged Out    Low dose CT lung screening &/or counseling  Discontinued             (applicable per patient's age: Cancer Screenings, Depression Screening, Fall Risk Screening, Immunizations)    LDL Cholesterol (mg/dL)   Date Value   03/07/2023 106     AST (U/L)   Date Value   03/07/2023 21     ALT (U/L)   Date Value   03/07/2023 19     BUN (mg/dL)   Date Value   03/07/2023 19      (goal A1C is < 7)   (goal LDL is <100) need 30-50% reduction from baseline     BP Readings from Last 3 Encounters:   02/22/24 102/64   11/17/23 126/74   09/14/23 126/82    (goal /80)      All Future Testing planned in [facility]:  Lab Frequency Next Occurrence       Next Visit Date:  Future Appointments   Date Time Provider Department Center   3/14/2024  1:20 PM Elham Murphy, APRN - CNP [facility]            Patient Active Problem List:     Thrombocytopenia (HCC)     Essential hypertension

## 2024-03-14 ENCOUNTER — OFFICE VISIT (OUTPATIENT)
Dept: PRIMARY CARE CLINIC | Age: 59
End: 2024-03-14
Payer: COMMERCIAL

## 2024-03-14 VITALS
SYSTOLIC BLOOD PRESSURE: 126 MMHG | RESPIRATION RATE: 16 BRPM | TEMPERATURE: 97.8 F | DIASTOLIC BLOOD PRESSURE: 76 MMHG | WEIGHT: 230.6 LBS | BODY MASS INDEX: 35.58 KG/M2 | HEART RATE: 75 BPM | OXYGEN SATURATION: 98 %

## 2024-03-14 DIAGNOSIS — I10 ESSENTIAL HYPERTENSION: ICD-10-CM

## 2024-03-14 DIAGNOSIS — Z13.220 LIPID SCREENING: ICD-10-CM

## 2024-03-14 DIAGNOSIS — L03.039 PARONYCHIA OF GREAT TOE: ICD-10-CM

## 2024-03-14 DIAGNOSIS — E03.9 HYPOTHYROIDISM (ACQUIRED): ICD-10-CM

## 2024-03-14 PROCEDURE — G8417 CALC BMI ABV UP PARAM F/U: HCPCS | Performed by: NURSE PRACTITIONER

## 2024-03-14 PROCEDURE — 99214 OFFICE O/P EST MOD 30 MIN: CPT | Performed by: NURSE PRACTITIONER

## 2024-03-14 PROCEDURE — G8484 FLU IMMUNIZE NO ADMIN: HCPCS | Performed by: NURSE PRACTITIONER

## 2024-03-14 PROCEDURE — 3074F SYST BP LT 130 MM HG: CPT | Performed by: NURSE PRACTITIONER

## 2024-03-14 PROCEDURE — 1036F TOBACCO NON-USER: CPT | Performed by: NURSE PRACTITIONER

## 2024-03-14 PROCEDURE — 3078F DIAST BP <80 MM HG: CPT | Performed by: NURSE PRACTITIONER

## 2024-03-14 PROCEDURE — 3017F COLORECTAL CA SCREEN DOC REV: CPT | Performed by: NURSE PRACTITIONER

## 2024-03-14 PROCEDURE — G8427 DOCREV CUR MEDS BY ELIG CLIN: HCPCS | Performed by: NURSE PRACTITIONER

## 2024-03-14 RX ORDER — CEPHALEXIN 500 MG/1
500 CAPSULE ORAL 2 TIMES DAILY
Qty: 20 CAPSULE | Refills: 0 | Status: SHIPPED | OUTPATIENT
Start: 2024-03-14 | End: 2024-03-24

## 2024-03-14 ASSESSMENT — PATIENT HEALTH QUESTIONNAIRE - PHQ9
5. POOR APPETITE OR OVEREATING: 0
6. FEELING BAD ABOUT YOURSELF - OR THAT YOU ARE A FAILURE OR HAVE LET YOURSELF OR YOUR FAMILY DOWN: 0
SUM OF ALL RESPONSES TO PHQ QUESTIONS 1-9: 0
2. FEELING DOWN, DEPRESSED OR HOPELESS: 0
3. TROUBLE FALLING OR STAYING ASLEEP: 0
SUM OF ALL RESPONSES TO PHQ QUESTIONS 1-9: 0
7. TROUBLE CONCENTRATING ON THINGS, SUCH AS READING THE NEWSPAPER OR WATCHING TELEVISION: 0
SUM OF ALL RESPONSES TO PHQ QUESTIONS 1-9: 0
SUM OF ALL RESPONSES TO PHQ9 QUESTIONS 1 & 2: 0
9. THOUGHTS THAT YOU WOULD BE BETTER OFF DEAD, OR OF HURTING YOURSELF: 0
10. IF YOU CHECKED OFF ANY PROBLEMS, HOW DIFFICULT HAVE THESE PROBLEMS MADE IT FOR YOU TO DO YOUR WORK, TAKE CARE OF THINGS AT HOME, OR GET ALONG WITH OTHER PEOPLE: 0
4. FEELING TIRED OR HAVING LITTLE ENERGY: 0
1. LITTLE INTEREST OR PLEASURE IN DOING THINGS: 0
SUM OF ALL RESPONSES TO PHQ QUESTIONS 1-9: 0
8. MOVING OR SPEAKING SO SLOWLY THAT OTHER PEOPLE COULD HAVE NOTICED. OR THE OPPOSITE, BEING SO FIGETY OR RESTLESS THAT YOU HAVE BEEN MOVING AROUND A LOT MORE THAN USUAL: 0

## 2024-03-14 NOTE — PROGRESS NOTES
Requested Prescriptions     Signed Prescriptions Disp Refills    cephALEXin (KEFLEX) 500 MG capsule 20 capsule 0     Sig: Take 1 capsule by mouth 2 times daily for 10 days         Return in about 6 months (around 9/14/2024).

## 2024-03-14 NOTE — PATIENT INSTRUCTIONS
SURVEY:     You may be receiving a survey from [facility] [employer] regarding your visit today.     Please complete the survey to enable us to provide the highest quality of care to you and your family.     If you cannot score us a very good on any question, please call the office to discuss how we could have made your experience a very good one.     Thank you,    Domo Silverman, APRN-CNP  Elham Murphy, APRN-CNP  Tiffanie, LPN  Franci, CMA  Gabino, CMA  Jesenia, CMA  Otilia, PCA  Phoebe, CMA  Kiersten, PM

## 2024-05-30 DIAGNOSIS — F41.9 ANXIETY AND DEPRESSION: ICD-10-CM

## 2024-05-30 DIAGNOSIS — F32.A ANXIETY AND DEPRESSION: ICD-10-CM

## 2024-05-30 RX ORDER — FENOFIBRATE 48 MG/1
48 TABLET, COATED ORAL DAILY
Qty: 90 TABLET | Refills: 1 | Status: SHIPPED | OUTPATIENT
Start: 2024-05-30

## 2024-05-30 NOTE — TELEPHONE ENCOUNTER
Health Maintenance   Topic Date Due    Cervical cancer screen  Never done    Hepatitis B vaccine (1 of 3 - 3-dose series) 09/14/2024 (Originally 1965)    COVID-19 Vaccine (1) 09/14/2024 (Originally 1965)    Hepatitis C screen  09/14/2024 (Originally 5/19/1984)    Flu vaccine (Season Ended) 02/22/2025 (Originally 8/1/2024)    HIV screen  02/22/2025 (Originally 5/19/1981)    Breast cancer screen  07/01/2024    Depression Monitoring  03/14/2025    Colorectal Cancer Screen  10/13/2026    Lipids  03/07/2028    DTaP/Tdap/Td vaccine (2 - Td or Tdap) 11/14/2032    Shingles vaccine  Completed    Hepatitis A vaccine  Aged Out    Hib vaccine  Aged Out    Polio vaccine  Aged Out    Meningococcal (ACWY) vaccine  Aged Out    Pneumococcal 0-64 years Vaccine  Aged Out    Low dose CT lung screening &/or counseling  Discontinued             (applicable per patient's age: Cancer Screenings, Depression Screening, Fall Risk Screening, Immunizations)    AST (U/L)   Date Value   03/07/2023 21     ALT (U/L)   Date Value   03/07/2023 19     BUN (mg/dL)   Date Value   03/07/2023 19      (goal A1C is < 7)   (goal LDL is <100) need 30-50% reduction from baseline     BP Readings from Last 3 Encounters:   03/14/24 126/76   02/22/24 102/64   11/17/23 126/74    (goal /80)      All Future Testing planned in [facility]:  Lab Frequency Next Occurrence   TSH With Reflex Ft4 Once 03/14/2024   Lipid Panel Once 03/15/2024   CBC with Auto Differential Once 03/14/2024   Comprehensive Metabolic Panel, Fasting Once 03/14/2024   Vitamin B12 & Folate Once 03/14/2024       Next Visit Date:  Future Appointments   Date Time Provider Department Center   9/9/2024  9:00 AM Elham Murphy APRN - CNP [facility]            Patient Active Problem List:     Thrombocytopenia (HCC)     Essential hypertension

## 2024-09-03 DIAGNOSIS — E03.9 HYPOTHYROIDISM, UNSPECIFIED TYPE: ICD-10-CM

## 2024-09-03 DIAGNOSIS — I10 ESSENTIAL HYPERTENSION: ICD-10-CM

## 2024-09-03 RX ORDER — LISINOPRIL 10 MG/1
10 TABLET ORAL DAILY
Qty: 90 TABLET | Refills: 1 | Status: SHIPPED | OUTPATIENT
Start: 2024-09-03

## 2024-09-03 RX ORDER — LEVOTHYROXINE SODIUM 75 UG/1
75 TABLET ORAL DAILY
Qty: 90 TABLET | Refills: 1 | Status: SHIPPED | OUTPATIENT
Start: 2024-09-03 | End: 2024-09-05

## 2024-09-03 NOTE — TELEPHONE ENCOUNTER
Health Maintenance   Topic Date Due    Cervical cancer screen  Never done    Breast cancer screen  07/01/2024    Flu vaccine (1) Never done    COVID-19 Vaccine (1 - 2023-24 season) Never done    Hepatitis B vaccine (1 of 3 - 19+ 3-dose series) 09/14/2024 (Originally 5/19/1985)    Hepatitis C screen  09/14/2024 (Originally 5/19/1984)    HIV screen  02/22/2025 (Originally 5/19/1981)    Depression Monitoring  03/14/2025    Colorectal Cancer Screen  10/13/2026    Lipids  03/07/2028    DTaP/Tdap/Td vaccine (2 - Td or Tdap) 11/14/2032    Shingles vaccine  Completed    Hepatitis A vaccine  Aged Out    Hib vaccine  Aged Out    Polio vaccine  Aged Out    Meningococcal (ACWY) vaccine  Aged Out    Pneumococcal 0-64 years Vaccine  Aged Out    Lung Cancer Screening &/or Counseling  Discontinued             (applicable per patient's age: Cancer Screenings, Depression Screening, Fall Risk Screening, Immunizations)    AST (U/L)   Date Value   03/07/2023 21     ALT (U/L)   Date Value   03/07/2023 19     BUN (mg/dL)   Date Value   03/07/2023 19      (goal A1C is < 7)   (goal LDL is <100) need 30-50% reduction from baseline     BP Readings from Last 3 Encounters:   03/14/24 126/76   02/22/24 102/64   11/17/23 126/74    (goal /80)      All Future Testing planned in [facility]:  Lab Frequency Next Occurrence   TSH With Reflex Ft4 Once 03/14/2024   Lipid Panel Once 03/15/2024   CBC with Auto Differential Once 03/14/2024   Comprehensive Metabolic Panel, Fasting Once 03/14/2024   Vitamin B12 & Folate Once 03/14/2024       Next Visit Date:  Future Appointments   Date Time Provider Department Center   9/5/2024  2:20 PM Elham Murphy, APRN - CNP [facility]            Patient Active Problem List:     Thrombocytopenia (HCC)     Essential hypertension

## 2024-09-05 ENCOUNTER — HOSPITAL ENCOUNTER (OUTPATIENT)
Age: 59
Discharge: HOME | End: 2024-09-05
Payer: COMMERCIAL

## 2024-09-05 ENCOUNTER — TELEPHONE (OUTPATIENT)
Dept: PRIMARY CARE CLINIC | Age: 59
End: 2024-09-05

## 2024-09-05 DIAGNOSIS — Z13.220 LIPID SCREENING: ICD-10-CM

## 2024-09-05 DIAGNOSIS — L03.039 PARONYCHIA OF GREAT TOE: ICD-10-CM

## 2024-09-05 DIAGNOSIS — E03.9 HYPOTHYROIDISM, UNSPECIFIED TYPE: ICD-10-CM

## 2024-09-05 DIAGNOSIS — I10 ESSENTIAL HYPERTENSION: ICD-10-CM

## 2024-09-05 DIAGNOSIS — E03.9 HYPOTHYROIDISM (ACQUIRED): ICD-10-CM

## 2024-09-05 LAB
ALBUMIN SERPL-MCNC: 4.5 G/DL (ref 3.5–5.2)
ALBUMIN/GLOB SERPL: 2 {RATIO} (ref 1–2.5)
ALP SERPL-CCNC: 58 U/L (ref 35–104)
ALT SERPL-CCNC: 21 U/L (ref 10–35)
ANION GAP SERPL CALCULATED.3IONS-SCNC: 9 MMOL/L (ref 9–16)
AST SERPL-CCNC: 23 U/L (ref 10–35)
BASOPHILS # BLD: 0.03 K/UL (ref 0–0.2)
BASOPHILS NFR BLD: 1 % (ref 0–2)
BILIRUB SERPL-MCNC: 0.5 MG/DL (ref 0–1.2)
BUN SERPL-MCNC: 18 MG/DL (ref 6–20)
BUN/CREAT SERPL: 20 (ref 9–20)
CALCIUM SERPL-MCNC: 9.5 MG/DL (ref 8.6–10.4)
CHLORIDE SERPL-SCNC: 105 MMOL/L (ref 98–107)
CHOLEST SERPL-MCNC: 167 MG/DL (ref 0–199)
CHOLESTEROL/HDL RATIO: 3
CO2 SERPL-SCNC: 26 MMOL/L (ref 20–31)
CREAT SERPL-MCNC: 0.9 MG/DL (ref 0.5–0.9)
EOSINOPHIL # BLD: 0.15 K/UL (ref 0–0.44)
EOSINOPHILS RELATIVE PERCENT: 4 % (ref 1–4)
ERYTHROCYTE [DISTWIDTH] IN BLOOD BY AUTOMATED COUNT: 12.3 % (ref 11.8–14.4)
GFR, ESTIMATED: 73 ML/MIN/1.73M2
GLUCOSE P FAST SERPL-MCNC: 88 MG/DL (ref 74–99)
HCT VFR BLD AUTO: 39.5 % (ref 36.3–47.1)
HDLC SERPL-MCNC: 52 MG/DL
HGB BLD-MCNC: 13.1 G/DL (ref 11.9–15.1)
IMM GRANULOCYTES # BLD AUTO: <0.03 K/UL (ref 0–0.3)
IMM GRANULOCYTES NFR BLD: 0 %
LDLC SERPL CALC-MCNC: 103 MG/DL (ref 0–100)
LYMPHOCYTES NFR BLD: 1.04 K/UL (ref 1.1–3.7)
LYMPHOCYTES RELATIVE PERCENT: 30 % (ref 24–43)
MCH RBC QN AUTO: 29.2 PG (ref 25.2–33.5)
MCHC RBC AUTO-ENTMCNC: 33.2 G/DL (ref 28.4–34.8)
MCV RBC AUTO: 88 FL (ref 82.6–102.9)
MONOCYTES NFR BLD: 0.32 K/UL (ref 0.1–1.2)
MONOCYTES NFR BLD: 9 % (ref 3–12)
NEUTROPHILS NFR BLD: 56 % (ref 36–65)
NEUTS SEG NFR BLD: 1.99 K/UL (ref 1.5–8.1)
NRBC BLD-RTO: 0 PER 100 WBC
PLATELET # BLD AUTO: 127 K/UL (ref 138–453)
PMV BLD AUTO: 12.2 FL (ref 8.1–13.5)
POTASSIUM SERPL-SCNC: 4.3 MMOL/L (ref 3.7–5.3)
PROT SERPL-MCNC: 6.9 G/DL (ref 6.6–8.7)
RBC # BLD AUTO: 4.49 M/UL (ref 3.95–5.11)
SODIUM SERPL-SCNC: 140 MMOL/L (ref 136–145)
T4 FREE SERPL-MCNC: 1.4 NG/DL (ref 0.92–1.68)
TRIGL SERPL-MCNC: 62 MG/DL
TSH SERPL DL<=0.05 MIU/L-ACNC: 4.41 UIU/ML (ref 0.27–4.2)
VLDLC SERPL CALC-MCNC: 12 MG/DL
WBC OTHER # BLD: 3.5 K/UL (ref 3.5–11.3)

## 2024-09-05 PROCEDURE — 85025 COMPLETE CBC W/AUTO DIFF WBC: CPT

## 2024-09-05 PROCEDURE — 82746 ASSAY OF FOLIC ACID SERUM: CPT

## 2024-09-05 PROCEDURE — 84443 ASSAY THYROID STIM HORMONE: CPT

## 2024-09-05 PROCEDURE — 82607 VITAMIN B-12: CPT

## 2024-09-05 PROCEDURE — 36415 COLL VENOUS BLD VENIPUNCTURE: CPT

## 2024-09-05 PROCEDURE — 80053 COMPREHEN METABOLIC PANEL: CPT

## 2024-09-05 PROCEDURE — 80061 LIPID PANEL: CPT

## 2024-09-05 PROCEDURE — 84439 ASSAY OF FREE THYROXINE: CPT

## 2024-09-05 RX ORDER — LEVOTHYROXINE SODIUM 88 UG/1
88 TABLET ORAL DAILY
Qty: 90 TABLET | Refills: 0 | Status: SHIPPED | OUTPATIENT
Start: 2024-09-05 | End: 2024-12-04

## 2024-09-05 NOTE — TELEPHONE ENCOUNTER
----- Message from CASTILLO Roa CNP sent at 9/5/2024  4:51 PM EDT -----  Please notify patient of  lab results.  Her TSH is slightly elevated and I am going to increase her Synthroid and repeat lab in 3 months. We are still waiting for a couple of the labs also.   Thanks Elham

## 2024-09-06 ENCOUNTER — TELEPHONE (OUTPATIENT)
Dept: PRIMARY CARE CLINIC | Age: 59
End: 2024-09-06

## 2024-09-06 LAB
FOLATE SERPL-MCNC: 10.9 NG/ML (ref 4.8–24.2)
VIT B12 SERPL-MCNC: 577 PG/ML (ref 232–1245)

## 2024-09-06 NOTE — TELEPHONE ENCOUNTER
----- Message from CASTILLO Martinez CNP sent at 9/6/2024  8:07 AM EDT -----  The rest of her labs are stable.  Follow Elham's instructions for thyroid.  Thank you.

## 2024-10-23 ENCOUNTER — TELEPHONE (OUTPATIENT)
Dept: PRIMARY CARE CLINIC | Age: 59
End: 2024-10-23

## 2024-12-11 DIAGNOSIS — E03.9 HYPOTHYROIDISM, UNSPECIFIED TYPE: ICD-10-CM

## 2024-12-11 DIAGNOSIS — F41.9 ANXIETY AND DEPRESSION: ICD-10-CM

## 2024-12-11 DIAGNOSIS — F32.A ANXIETY AND DEPRESSION: ICD-10-CM

## 2024-12-11 RX ORDER — LEVOTHYROXINE SODIUM 88 UG/1
88 TABLET ORAL DAILY
Qty: 90 TABLET | Refills: 0 | Status: SHIPPED | OUTPATIENT
Start: 2024-12-11 | End: 2025-03-11

## 2024-12-11 RX ORDER — FENOFIBRATE 48 MG/1
48 TABLET, COATED ORAL DAILY
Qty: 90 TABLET | Refills: 1 | Status: SHIPPED | OUTPATIENT
Start: 2024-12-11

## 2024-12-11 NOTE — TELEPHONE ENCOUNTER
Patient needs refills of her medication. She has not completed her follow up TSH level you ordered.   Thank you           Health Maintenance   Topic Date Due    Hepatitis C screen  Never done    Hepatitis B vaccine (1 of 3 - 19+ 3-dose series) Never done    Cervical cancer screen  Never done    Lung Cancer Screening &/or Counseling  Never done    Breast cancer screen  07/01/2024    Flu vaccine (1) Never done    COVID-19 Vaccine (1 - 2023-24 season) Never done    HIV screen  02/22/2025 (Originally 5/19/1980)    Depression Screen  03/14/2025    Colorectal Cancer Screen  10/13/2026    Lipids  09/05/2029    DTaP/Tdap/Td vaccine (2 - Td or Tdap) 11/14/2032    Shingles vaccine  Completed    Hepatitis A vaccine  Aged Out    Hib vaccine  Aged Out    Polio vaccine  Aged Out    Meningococcal (ACWY) vaccine  Aged Out    Pneumococcal 0-64 years Vaccine  Aged Out    Depression Monitoring  Discontinued             (applicable per patient's age: Cancer Screenings, Depression Screening, Fall Risk Screening, Immunizations)    AST (U/L)   Date Value   09/05/2024 23     ALT (U/L)   Date Value   09/05/2024 21     BUN (mg/dL)   Date Value   09/05/2024 18      (goal A1C is < 7)   (goal LDL is <100) need 30-50% reduction from baseline     BP Readings from Last 3 Encounters:   03/14/24 126/76   02/22/24 102/64   11/17/23 126/74    (goal /80)      All Future Testing planned in [facility]:  Lab Frequency Next Occurrence   TSH Once 12/04/2024       Next Visit Date:  No future appointments.         Patient Active Problem List:     Thrombocytopenia (HCC)     Essential hypertension

## 2024-12-12 ENCOUNTER — TELEPHONE (OUTPATIENT)
Dept: PRIMARY CARE CLINIC | Age: 59
End: 2024-12-12

## 2025-03-19 ENCOUNTER — HOSPITAL ENCOUNTER (OUTPATIENT)
Age: 60
Discharge: HOME | End: 2025-03-19
Payer: COMMERCIAL

## 2025-03-19 ENCOUNTER — RESULTS FOLLOW-UP (OUTPATIENT)
Dept: PRIMARY CARE CLINIC | Age: 60
End: 2025-03-19

## 2025-03-19 DIAGNOSIS — E03.9 HYPOTHYROIDISM, UNSPECIFIED TYPE: ICD-10-CM

## 2025-03-19 LAB — TSH SERPL DL<=0.05 MIU/L-ACNC: 3.92 UIU/ML (ref 0.27–4.2)

## 2025-03-19 PROCEDURE — 36415 COLL VENOUS BLD VENIPUNCTURE: CPT

## 2025-03-19 PROCEDURE — 84443 ASSAY THYROID STIM HORMONE: CPT

## 2025-03-20 DIAGNOSIS — M25.562 ACUTE PAIN OF BOTH KNEES: ICD-10-CM

## 2025-03-20 DIAGNOSIS — E03.9 HYPOTHYROIDISM, UNSPECIFIED TYPE: ICD-10-CM

## 2025-03-20 DIAGNOSIS — M25.561 ACUTE PAIN OF BOTH KNEES: ICD-10-CM

## 2025-03-20 DIAGNOSIS — I10 ESSENTIAL HYPERTENSION: ICD-10-CM

## 2025-03-20 RX ORDER — LISINOPRIL 10 MG/1
10 TABLET ORAL DAILY
Qty: 90 TABLET | Refills: 1 | Status: SHIPPED | OUTPATIENT
Start: 2025-03-20

## 2025-03-20 RX ORDER — LEVOTHYROXINE SODIUM 88 UG/1
88 TABLET ORAL DAILY
Qty: 90 TABLET | Refills: 0 | Status: SHIPPED | OUTPATIENT
Start: 2025-03-20 | End: 2025-06-18

## 2025-03-20 RX ORDER — FENOFIBRATE 48 MG/1
48 TABLET, COATED ORAL DAILY
Qty: 90 TABLET | Refills: 1 | Status: SHIPPED | OUTPATIENT
Start: 2025-03-20

## 2025-03-20 RX ORDER — MELOXICAM 15 MG/1
15 TABLET ORAL DAILY
Qty: 90 TABLET | Refills: 1 | Status: SHIPPED | OUTPATIENT
Start: 2025-03-20 | End: 2025-09-16

## 2025-03-20 NOTE — TELEPHONE ENCOUNTER
----- Message from CASTILLO Roa CNP sent at 3/19/2025  5:04 PM EDT -----  Please notify patient of normal TSH results.  Thanks Elham

## 2025-03-20 NOTE — TELEPHONE ENCOUNTER
Health Maintenance   Topic Date Due    HIV screen  Never done    Hepatitis C screen  Never done    Hepatitis B vaccine (1 of 3 - 19+ 3-dose series) Never done    Cervical cancer screen  Never done    Pneumococcal 50+ years Vaccine (1 of 1 - PCV) Never done    Lung Cancer Screening &/or Counseling  Never done    Breast cancer screen  07/01/2024    Flu vaccine (1) Never done    COVID-19 Vaccine (1 - 2024-25 season) Never done    Depression Screen  03/14/2025    Colorectal Cancer Screen  10/13/2026    Lipids  09/05/2029    DTaP/Tdap/Td vaccine (2 - Td or Tdap) 11/14/2032    Shingles vaccine  Completed    Hepatitis A vaccine  Aged Out    Hib vaccine  Aged Out    Polio vaccine  Aged Out    Meningococcal (ACWY) vaccine  Aged Out    Meningococcal B vaccine  Aged Out    Depression Monitoring  Discontinued    Diabetes screen  Discontinued             (applicable per patient's age: Cancer Screenings, Depression Screening, Fall Risk Screening, Immunizations)    AST (U/L)   Date Value   09/05/2024 23     ALT (U/L)   Date Value   09/05/2024 21     BUN (mg/dL)   Date Value   09/05/2024 18      (goal A1C is < 7)   (goal LDL is <100) need 30-50% reduction from baseline     BP Readings from Last 3 Encounters:   03/14/24 126/76   02/22/24 102/64   11/17/23 126/74    (goal /80)      All Future Testing planned in [facility]:  Lab Frequency Next Occurrence       Next Visit Date:  No future appointments.         Patient Active Problem List:     Thrombocytopenia     Essential hypertension

## 2025-07-01 DIAGNOSIS — E03.9 HYPOTHYROIDISM, UNSPECIFIED TYPE: ICD-10-CM

## 2025-07-01 DIAGNOSIS — F41.9 ANXIETY AND DEPRESSION: ICD-10-CM

## 2025-07-01 DIAGNOSIS — F32.A ANXIETY AND DEPRESSION: ICD-10-CM

## 2025-07-01 RX ORDER — LEVOTHYROXINE SODIUM 88 UG/1
88 TABLET ORAL DAILY
Qty: 30 TABLET | Refills: 0 | Status: SHIPPED | OUTPATIENT
Start: 2025-07-01 | End: 2025-09-29

## 2025-07-01 NOTE — TELEPHONE ENCOUNTER
Health Maintenance   Topic Date Due    HIV screen  Never done    Hepatitis C screen  Never done    Hepatitis B vaccine (1 of 3 - 19+ 3-dose series) Never done    Cervical cancer screen  Never done    Pneumococcal 50+ years Vaccine (1 of 1 - PCV) Never done    Lung Cancer Screening &/or Counseling  Never done    Breast cancer screen  07/01/2024    COVID-19 Vaccine (1 - 2024-25 season) Never done    Depression Screen  03/14/2025    Flu vaccine (1) 08/01/2025    Colorectal Cancer Screen  10/13/2026    Lipids  09/05/2029    DTaP/Tdap/Td vaccine (2 - Td or Tdap) 11/14/2032    Shingles vaccine  Completed    Hepatitis A vaccine  Aged Out    Hib vaccine  Aged Out    Polio vaccine  Aged Out    Meningococcal (ACWY) vaccine  Aged Out    Meningococcal B vaccine  Aged Out    Depression Monitoring  Discontinued    Diabetes screen  Discontinued             (applicable per patient's age: Cancer Screenings, Depression Screening, Fall Risk Screening, Immunizations)    AST (U/L)   Date Value   09/05/2024 23     ALT (U/L)   Date Value   09/05/2024 21     BUN (mg/dL)   Date Value   09/05/2024 18      (goal A1C is < 7)   (goal LDL is <100) need 30-50% reduction from baseline     BP Readings from Last 3 Encounters:   03/14/24 126/76   02/22/24 102/64   11/17/23 126/74    (goal /80)      All Future Testing planned in [facility]:  Lab Frequency Next Occurrence       Next Visit Date:  Future Appointments   Date Time Provider Department Center   7/15/2025  2:20 PM Elham Murphy, APRN - CNP [facility]            Patient Active Problem List:     Thrombocytopenia     Essential hypertension

## 2025-07-15 ENCOUNTER — OFFICE VISIT (OUTPATIENT)
Dept: PRIMARY CARE CLINIC | Age: 60
End: 2025-07-15
Payer: COMMERCIAL

## 2025-07-15 VITALS
WEIGHT: 218.4 LBS | SYSTOLIC BLOOD PRESSURE: 122 MMHG | OXYGEN SATURATION: 98 % | HEIGHT: 68 IN | BODY MASS INDEX: 33.1 KG/M2 | TEMPERATURE: 98.4 F | DIASTOLIC BLOOD PRESSURE: 84 MMHG | HEART RATE: 57 BPM | RESPIRATION RATE: 16 BRPM

## 2025-07-15 DIAGNOSIS — I10 ESSENTIAL HYPERTENSION: ICD-10-CM

## 2025-07-15 DIAGNOSIS — E03.9 HYPOTHYROIDISM, UNSPECIFIED TYPE: ICD-10-CM

## 2025-07-15 DIAGNOSIS — Z13.1 DIABETES MELLITUS SCREENING: ICD-10-CM

## 2025-07-15 DIAGNOSIS — Z12.31 OTHER SCREENING MAMMOGRAM: ICD-10-CM

## 2025-07-15 DIAGNOSIS — Z00.00 ENCOUNTER FOR WELL ADULT EXAM WITHOUT ABNORMAL FINDINGS: Primary | ICD-10-CM

## 2025-07-15 DIAGNOSIS — Z13.220 LIPID SCREENING: ICD-10-CM

## 2025-07-15 DIAGNOSIS — Z87.891 PERSONAL HISTORY OF TOBACCO USE: ICD-10-CM

## 2025-07-15 PROCEDURE — 3074F SYST BP LT 130 MM HG: CPT | Performed by: NURSE PRACTITIONER

## 2025-07-15 PROCEDURE — 3079F DIAST BP 80-89 MM HG: CPT | Performed by: NURSE PRACTITIONER

## 2025-07-15 PROCEDURE — G0296 VISIT TO DETERM LDCT ELIG: HCPCS | Performed by: NURSE PRACTITIONER

## 2025-07-15 PROCEDURE — 99396 PREV VISIT EST AGE 40-64: CPT | Performed by: NURSE PRACTITIONER

## 2025-07-15 SDOH — ECONOMIC STABILITY: FOOD INSECURITY: WITHIN THE PAST 12 MONTHS, YOU WORRIED THAT YOUR FOOD WOULD RUN OUT BEFORE YOU GOT MONEY TO BUY MORE.: NEVER TRUE

## 2025-07-15 SDOH — ECONOMIC STABILITY: FOOD INSECURITY: WITHIN THE PAST 12 MONTHS, THE FOOD YOU BOUGHT JUST DIDN'T LAST AND YOU DIDN'T HAVE MONEY TO GET MORE.: NEVER TRUE

## 2025-07-15 ASSESSMENT — PATIENT HEALTH QUESTIONNAIRE - PHQ9
2. FEELING DOWN, DEPRESSED OR HOPELESS: NOT AT ALL
SUM OF ALL RESPONSES TO PHQ QUESTIONS 1-9: 0
1. LITTLE INTEREST OR PLEASURE IN DOING THINGS: NOT AT ALL
SUM OF ALL RESPONSES TO PHQ QUESTIONS 1-9: 0

## 2025-07-15 ASSESSMENT — ENCOUNTER SYMPTOMS
RESPIRATORY NEGATIVE: 1
ALLERGIC/IMMUNOLOGIC NEGATIVE: 1
EYES NEGATIVE: 1
GASTROINTESTINAL NEGATIVE: 1

## 2025-07-15 NOTE — PATIENT INSTRUCTIONS
SURVEY:     You may be receiving a survey from [facility] [employer] regarding your visit today.     Please complete the survey to enable us to provide the highest quality of care to you and your family.     If you cannot score us a very good on any question, please call the office to discuss how we could have made your experience a very good one.     Thank you,    Domo Silverman, APRN-CNP  Elham Murphy, APRN-CNP  Tiffanie, LPN  Franci, CMA  Gabino, CMA  Jesenia, CMA  Otilia, PCA  Phoebe, CMA  Kiersten, PM         Learning About Lung Cancer Screening  What is screening for lung cancer?     Lung cancer screening is a way to find some lung cancers early, before a person has any symptoms of the cancer.  Lung cancer screening may help those who have the highest risk for lung cancer--people age 50 and older who are or were heavy smokers. For most people, who aren't at increased risk, screening for lung cancer probably isn't helpful.  Screening won't prevent cancer. And it may not find all lung cancers. Lung cancer screening may lower the risk of dying from lung cancer in a small number of people.  How is it done?  Lung cancer screening is done with a low-dose CT (computed tomography) scan. A CT scan uses X-rays, or radiation, to make detailed pictures of your body. Experts recommend that screening be done in medical centers that focus on finding and treating lung cancer.  Who is screening recommended for?  Lung cancer screening is recommended for people age 50 and older who are or were heavy smokers. That means people with a smoking history of at least 20 pack years. A pack year is a way to measure how heavy a smoker you are or were.  To figure out your pack years, multiply how many packs a day on average (assuming 20 cigarettes per pack) you have smoked by how many years you have smoked. For example:  If you smoked 1 pack a day for 20 years, that's 1 times 20. So you have a smoking history of 20 pack years.  If you smoked 2 packs a day

## 2025-07-15 NOTE — PROGRESS NOTES
Well Adult Note  Name: Sonam Gillette Today’s Date: 7/15/2025   MRN: [id number] Sex: Female   Age: 60 y.o. Ethnicity: Non- / Non    : 1965 Race: White (non-)      Sonam Gillette is here for a well adult exam.       Assessment & Plan   Encounter for well adult exam without abnormal findings  Essential hypertension  -     Comprehensive Metabolic Panel, Fasting; Future  -     CBC with Auto Differential; Future  Hypothyroidism, unspecified type  -     Comprehensive Metabolic Panel, Fasting; Future  -     CBC with Auto Differential; Future  -     TSH reflex to FT4; Future  Personal history of tobacco use  -     UT VISIT TO DISCUSS LUNG CA SCREEN W LDCT  -     CT Lung Screen (Initial/Annual/Baseline); Future  Diabetes mellitus screening  -     Hemoglobin A1C; Future  Other screening mammogram  -     KENISHA YONNY DIGITAL SCREEN BILATERAL; Future  Lipid screening  -     Lipid Panel; Future        Return in 1 year (on 7/15/2026) for CPE (Physical Exam).       Subjective   History:  History of hypothyroidism and lab work due.  She has lost 12 pounds since her last appointment.  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  She has noticed that she is thirsty all the time and she does drink a lot of water.  She has a family history of Diabetes and is concerned due to her excessive thirst.      Review of Systems   Constitutional: Negative.    HENT: Negative.     Eyes: Negative.    Respiratory: Negative.     Cardiovascular: Negative.    Gastrointestinal: Negative.    Endocrine: Positive for polydipsia.   Genitourinary: Negative.    Musculoskeletal: Negative.    Skin: Negative.    Allergic/Immunologic: Negative.    Neurological: Negative.    Hematological: Negative.    Psychiatric/Behavioral: Negative.         No Known Allergies  Prior to Visit Medications    Medication Sig Taking? Authorizing Provider   levothyroxine (SYNTHROID) 88 MCG tablet Take 1 tablet by mouth Daily Yes Jeffrey

## 2025-07-15 NOTE — PROGRESS NOTES
Discussed with the patient the current USPSTF guidelines released March 9, 2021 for screening for lung cancer.    For adults aged 50 to 80 years who have a 20 pack-year smoking history and currently smoke or have quit within the past 15 years the grade B recommendation is to:  Screen for lung cancer with low-dose computed tomography (LDCT) every year.  Stop screening once a person has not smoked for 15 years or has a health problem that limits life expectancy or the ability to have lung surgery.    The patient  reports that she quit smoking about 3 years ago. Her smoking use included cigarettes. She started smoking about 23 years ago. She has a 30 pack-year smoking history. She has never used smokeless tobacco.. Discussed with patient the risks and benefits of screening, including over-diagnosis, false positive rate, and total radiation exposure.  The patient currently exhibits no signs or symptoms suggestive of lung cancer.  Discussed with patient the importance of compliance with yearly annual lung cancer screenings and willingness to undergo diagnosis and treatment if screening scan is positive.  In addition, the patient was counseled regarding the importance of remaining smoke free and/or total smoking cessation.    Also reviewed the following if the patient has Medicare that as of February 10, 2022, Medicare only covers LDCT screening in patients aged 50-77 with at least a 20 pack-year smoking history who currently smoke or have quit in the last 15 years

## 2025-08-08 ENCOUNTER — HOSPITAL ENCOUNTER (OUTPATIENT)
Dept: LAB | Age: 60
Discharge: HOME | End: 2025-08-08
Payer: COMMERCIAL

## 2025-08-08 DIAGNOSIS — F41.9 ANXIETY AND DEPRESSION: ICD-10-CM

## 2025-08-08 DIAGNOSIS — F32.A ANXIETY AND DEPRESSION: ICD-10-CM

## 2025-08-08 DIAGNOSIS — E03.9 HYPOTHYROIDISM, UNSPECIFIED TYPE: ICD-10-CM

## 2025-08-08 DIAGNOSIS — I10 ESSENTIAL HYPERTENSION: ICD-10-CM

## 2025-08-08 DIAGNOSIS — Z13.220 LIPID SCREENING: ICD-10-CM

## 2025-08-08 DIAGNOSIS — Z13.1 DIABETES MELLITUS SCREENING: ICD-10-CM

## 2025-08-08 LAB
ALBUMIN SERPL-MCNC: 4.5 G/DL (ref 3.5–5.2)
ALBUMIN/GLOB SERPL: 1.6 {RATIO} (ref 1–2.5)
ALP SERPL-CCNC: 57 U/L (ref 35–104)
ALT SERPL-CCNC: 26 U/L (ref 10–35)
ANION GAP SERPL CALCULATED.3IONS-SCNC: 12 MMOL/L (ref 9–16)
AST SERPL-CCNC: 28 U/L (ref 10–35)
BASOPHILS # BLD: <0.03 K/UL (ref 0–0.2)
BASOPHILS NFR BLD: 0 % (ref 0–2)
BILIRUB SERPL-MCNC: 0.7 MG/DL (ref 0–1.2)
BUN SERPL-MCNC: 22 MG/DL (ref 6–20)
BUN/CREAT SERPL: 24 (ref 9–20)
CALCIUM SERPL-MCNC: 9.2 MG/DL (ref 8.6–10.4)
CHLORIDE SERPL-SCNC: 105 MMOL/L (ref 98–107)
CHOLEST SERPL-MCNC: 166 MG/DL (ref 0–199)
CHOLESTEROL/HDL RATIO: 3.1
CO2 SERPL-SCNC: 22 MMOL/L (ref 20–31)
CREAT SERPL-MCNC: 0.9 MG/DL (ref 0.5–0.9)
EOSINOPHIL # BLD: 0.13 K/UL (ref 0–0.44)
EOSINOPHILS RELATIVE PERCENT: 3 % (ref 1–4)
ERYTHROCYTE [DISTWIDTH] IN BLOOD BY AUTOMATED COUNT: 12.1 % (ref 11.8–14.4)
EST. AVERAGE GLUCOSE BLD GHB EST-MCNC: 97 MG/DL
GFR, ESTIMATED: 73 ML/MIN/1.73M2
GLUCOSE P FAST SERPL-MCNC: 87 MG/DL (ref 74–99)
HBA1C MFR BLD: 5 % (ref 4–6)
HCT VFR BLD AUTO: 42 % (ref 36.3–47.1)
HDLC SERPL-MCNC: 54 MG/DL
HGB BLD-MCNC: 13.7 G/DL (ref 11.9–15.1)
IMM GRANULOCYTES # BLD AUTO: <0.03 K/UL (ref 0–0.3)
IMM GRANULOCYTES NFR BLD: 0 %
LDLC SERPL CALC-MCNC: 98 MG/DL (ref 0–100)
LYMPHOCYTES NFR BLD: 1.16 K/UL (ref 1.1–3.7)
LYMPHOCYTES RELATIVE PERCENT: 24 % (ref 24–43)
MCH RBC QN AUTO: 29.1 PG (ref 25.2–33.5)
MCHC RBC AUTO-ENTMCNC: 32.6 G/DL (ref 28.4–34.8)
MCV RBC AUTO: 89.2 FL (ref 82.6–102.9)
MONOCYTES NFR BLD: 0.39 K/UL (ref 0.1–1.2)
MONOCYTES NFR BLD: 8 % (ref 3–12)
NEUTROPHILS NFR BLD: 65 % (ref 36–65)
NEUTS SEG NFR BLD: 3.05 K/UL (ref 1.5–8.1)
NRBC BLD-RTO: 0 PER 100 WBC
PLATELET # BLD AUTO: 151 K/UL (ref 138–453)
PMV BLD AUTO: 12.5 FL (ref 8.1–13.5)
POTASSIUM SERPL-SCNC: 4.5 MMOL/L (ref 3.7–5.3)
PROT SERPL-MCNC: 7.3 G/DL (ref 6.6–8.7)
RBC # BLD AUTO: 4.71 M/UL (ref 3.95–5.11)
SODIUM SERPL-SCNC: 139 MMOL/L (ref 136–145)
T4 FREE SERPL-MCNC: 1.1 NG/DL (ref 0.9–1.7)
TRIGL SERPL-MCNC: 71 MG/DL
TSH SERPL DL<=0.05 MIU/L-ACNC: 6.6 UIU/ML (ref 0.27–4.2)
VLDLC SERPL CALC-MCNC: 14 MG/DL (ref 1–30)
WBC OTHER # BLD: 4.8 K/UL (ref 3.5–11.3)

## 2025-08-08 PROCEDURE — 84443 ASSAY THYROID STIM HORMONE: CPT

## 2025-08-08 PROCEDURE — 85025 COMPLETE CBC W/AUTO DIFF WBC: CPT

## 2025-08-08 PROCEDURE — 84439 ASSAY OF FREE THYROXINE: CPT

## 2025-08-08 PROCEDURE — 36415 COLL VENOUS BLD VENIPUNCTURE: CPT

## 2025-08-08 PROCEDURE — 80053 COMPREHEN METABOLIC PANEL: CPT

## 2025-08-08 PROCEDURE — 80061 LIPID PANEL: CPT

## 2025-08-08 PROCEDURE — 83036 HEMOGLOBIN GLYCOSYLATED A1C: CPT

## 2025-08-11 ENCOUNTER — RESULTS FOLLOW-UP (OUTPATIENT)
Dept: PRIMARY CARE CLINIC | Age: 60
End: 2025-08-11

## 2025-08-11 DIAGNOSIS — E03.9 HYPOTHYROIDISM, UNSPECIFIED TYPE: Primary | ICD-10-CM

## 2025-08-11 RX ORDER — LEVOTHYROXINE SODIUM 100 UG/1
100 TABLET ORAL DAILY
Qty: 90 TABLET | Refills: 1 | Status: SHIPPED | OUTPATIENT
Start: 2025-08-11

## 2025-08-11 RX ORDER — LEVOTHYROXINE SODIUM 88 UG/1
88 TABLET ORAL DAILY
Qty: 30 TABLET | Refills: 0 | Status: SHIPPED | OUTPATIENT
Start: 2025-08-11 | End: 2025-08-11 | Stop reason: DRUGHIGH

## 2025-08-11 RX ORDER — LISINOPRIL 10 MG/1
10 TABLET ORAL DAILY
Qty: 30 TABLET | Refills: 0 | Status: SHIPPED | OUTPATIENT
Start: 2025-08-11

## 2025-08-12 ENCOUNTER — OFFICE VISIT (OUTPATIENT)
Dept: PRIMARY CARE CLINIC | Age: 60
End: 2025-08-12
Payer: COMMERCIAL

## 2025-08-12 VITALS
BODY MASS INDEX: 33.49 KG/M2 | OXYGEN SATURATION: 98 % | DIASTOLIC BLOOD PRESSURE: 72 MMHG | SYSTOLIC BLOOD PRESSURE: 118 MMHG | WEIGHT: 217 LBS | RESPIRATION RATE: 20 BRPM | HEART RATE: 68 BPM | TEMPERATURE: 98.5 F

## 2025-08-12 DIAGNOSIS — J01.40 ACUTE NON-RECURRENT PANSINUSITIS: Primary | ICD-10-CM

## 2025-08-12 PROCEDURE — G8417 CALC BMI ABV UP PARAM F/U: HCPCS | Performed by: NURSE PRACTITIONER

## 2025-08-12 PROCEDURE — 3078F DIAST BP <80 MM HG: CPT | Performed by: NURSE PRACTITIONER

## 2025-08-12 PROCEDURE — 3074F SYST BP LT 130 MM HG: CPT | Performed by: NURSE PRACTITIONER

## 2025-08-12 PROCEDURE — 99214 OFFICE O/P EST MOD 30 MIN: CPT | Performed by: NURSE PRACTITIONER

## 2025-08-12 PROCEDURE — G8427 DOCREV CUR MEDS BY ELIG CLIN: HCPCS | Performed by: NURSE PRACTITIONER

## 2025-08-12 PROCEDURE — 1036F TOBACCO NON-USER: CPT | Performed by: NURSE PRACTITIONER

## 2025-08-12 PROCEDURE — 3017F COLORECTAL CA SCREEN DOC REV: CPT | Performed by: NURSE PRACTITIONER

## 2025-08-12 RX ORDER — AZITHROMYCIN 250 MG/1
TABLET, FILM COATED ORAL
Qty: 6 TABLET | Refills: 0 | Status: SHIPPED | OUTPATIENT
Start: 2025-08-12 | End: 2025-08-22

## 2025-08-12 ASSESSMENT — ENCOUNTER SYMPTOMS
ALLERGIC/IMMUNOLOGIC NEGATIVE: 1
NAUSEA: 1
SINUS PAIN: 1
SINUS PRESSURE: 1
COUGH: 1
RHINORRHEA: 1
EYES NEGATIVE: 1

## 2025-08-12 ASSESSMENT — PATIENT HEALTH QUESTIONNAIRE - PHQ9
SUM OF ALL RESPONSES TO PHQ QUESTIONS 1-9: 0
2. FEELING DOWN, DEPRESSED OR HOPELESS: NOT AT ALL
SUM OF ALL RESPONSES TO PHQ QUESTIONS 1-9: 0
1. LITTLE INTEREST OR PLEASURE IN DOING THINGS: NOT AT ALL